# Patient Record
Sex: FEMALE | Race: WHITE | NOT HISPANIC OR LATINO | Employment: OTHER | URBAN - METROPOLITAN AREA
[De-identification: names, ages, dates, MRNs, and addresses within clinical notes are randomized per-mention and may not be internally consistent; named-entity substitution may affect disease eponyms.]

---

## 2017-11-14 ENCOUNTER — CONVERSION ENCOUNTER (OUTPATIENT)
Dept: MAMMOGRAPHY | Facility: HOSPITAL | Age: 57
End: 2017-11-14

## 2018-01-09 ENCOUNTER — OFFICE VISIT CONVERTED (OUTPATIENT)
Dept: GASTROENTEROLOGY | Facility: CLINIC | Age: 58
End: 2018-01-09
Attending: INTERNAL MEDICINE

## 2018-01-09 ENCOUNTER — CONVERSION ENCOUNTER (OUTPATIENT)
Dept: GASTROENTEROLOGY | Facility: CLINIC | Age: 58
End: 2018-01-09

## 2018-01-24 ENCOUNTER — OFFICE VISIT CONVERTED (OUTPATIENT)
Dept: UROLOGY | Facility: CLINIC | Age: 58
End: 2018-01-24
Attending: UROLOGY

## 2018-04-10 ENCOUNTER — OFFICE VISIT CONVERTED (OUTPATIENT)
Dept: GASTROENTEROLOGY | Facility: CLINIC | Age: 58
End: 2018-04-10
Attending: INTERNAL MEDICINE

## 2018-04-10 ENCOUNTER — CONVERSION ENCOUNTER (OUTPATIENT)
Dept: GASTROENTEROLOGY | Facility: CLINIC | Age: 58
End: 2018-04-10

## 2018-07-16 ENCOUNTER — OFFICE VISIT CONVERTED (OUTPATIENT)
Dept: GASTROENTEROLOGY | Facility: CLINIC | Age: 58
End: 2018-07-16
Attending: NURSE PRACTITIONER

## 2018-07-16 ENCOUNTER — CONVERSION ENCOUNTER (OUTPATIENT)
Dept: GASTROENTEROLOGY | Facility: CLINIC | Age: 58
End: 2018-07-16

## 2018-08-02 ENCOUNTER — OFFICE VISIT CONVERTED (OUTPATIENT)
Dept: UROLOGY | Facility: CLINIC | Age: 58
End: 2018-08-02
Attending: UROLOGY

## 2018-09-04 ENCOUNTER — OFFICE VISIT CONVERTED (OUTPATIENT)
Dept: CARDIOLOGY | Facility: CLINIC | Age: 58
End: 2018-09-04
Attending: SPECIALIST

## 2018-09-04 ENCOUNTER — CONVERSION ENCOUNTER (OUTPATIENT)
Dept: CARDIOLOGY | Facility: CLINIC | Age: 58
End: 2018-09-04

## 2018-09-18 ENCOUNTER — CONVERSION ENCOUNTER (OUTPATIENT)
Dept: CARDIOLOGY | Facility: CLINIC | Age: 58
End: 2018-09-18
Attending: SPECIALIST

## 2018-10-18 ENCOUNTER — CONVERSION ENCOUNTER (OUTPATIENT)
Dept: GASTROENTEROLOGY | Facility: CLINIC | Age: 58
End: 2018-10-18

## 2018-10-18 ENCOUNTER — OFFICE VISIT CONVERTED (OUTPATIENT)
Dept: GASTROENTEROLOGY | Facility: CLINIC | Age: 58
End: 2018-10-18
Attending: INTERNAL MEDICINE

## 2018-12-03 ENCOUNTER — OFFICE VISIT CONVERTED (OUTPATIENT)
Dept: PODIATRY | Facility: CLINIC | Age: 58
End: 2018-12-03
Attending: PODIATRIST

## 2019-01-17 ENCOUNTER — HOSPITAL ENCOUNTER (OUTPATIENT)
Dept: MAMMOGRAPHY | Facility: HOSPITAL | Age: 59
Discharge: HOME | End: 2019-01-17
Attending: NURSE PRACTITIONER

## 2019-02-04 ENCOUNTER — OFFICE VISIT CONVERTED (OUTPATIENT)
Dept: UROLOGY | Facility: CLINIC | Age: 59
End: 2019-02-04
Attending: UROLOGY

## 2019-02-06 ENCOUNTER — HOSPITAL ENCOUNTER (OUTPATIENT)
Dept: GENERAL RADIOLOGY | Facility: HOSPITAL | Age: 59
Discharge: HOME | End: 2019-02-06
Attending: NURSE PRACTITIONER

## 2019-02-11 ENCOUNTER — HOSPITAL ENCOUNTER (OUTPATIENT)
Dept: ULTRASOUND IMAGING | Facility: HOSPITAL | Age: 59
Discharge: HOME | End: 2019-02-11
Attending: UROLOGY

## 2019-02-18 ENCOUNTER — CONVERSION ENCOUNTER (OUTPATIENT)
Dept: UROLOGY | Facility: CLINIC | Age: 59
End: 2019-02-18

## 2019-02-18 ENCOUNTER — OFFICE VISIT CONVERTED (OUTPATIENT)
Dept: UROLOGY | Facility: CLINIC | Age: 59
End: 2019-02-18
Attending: UROLOGY

## 2019-02-19 ENCOUNTER — CONVERSION ENCOUNTER (OUTPATIENT)
Dept: GASTROENTEROLOGY | Facility: CLINIC | Age: 59
End: 2019-02-19

## 2019-02-19 ENCOUNTER — HOSPITAL ENCOUNTER (OUTPATIENT)
Dept: GENERAL RADIOLOGY | Facility: HOSPITAL | Age: 59
Discharge: HOME | End: 2019-02-19
Attending: UROLOGY

## 2019-02-19 ENCOUNTER — OFFICE VISIT CONVERTED (OUTPATIENT)
Dept: GASTROENTEROLOGY | Facility: CLINIC | Age: 59
End: 2019-02-19
Attending: INTERNAL MEDICINE

## 2019-02-20 ENCOUNTER — HOSPITAL ENCOUNTER (OUTPATIENT)
Dept: PERIOP | Facility: HOSPITAL | Age: 59
Setting detail: HOSPITAL OUTPATIENT SURGERY
Discharge: HOME | End: 2019-02-20
Attending: UROLOGY

## 2019-02-20 LAB
ALBUMIN SERPL-MCNC: 4 G/DL (ref 3.5–5)
ALBUMIN/GLOB SERPL: 1.4 {RATIO} (ref 1.4–2.6)
ALP SERPL-CCNC: 86 U/L (ref 53–141)
ALT SERPL-CCNC: 23 U/L (ref 10–40)
ANION GAP SERPL CALC-SCNC: 13 MMOL/L (ref 8–19)
AST SERPL-CCNC: 20 U/L (ref 15–50)
BASOPHILS # BLD AUTO: 0.04 10*3/UL (ref 0–0.2)
BASOPHILS NFR BLD AUTO: 0.9 % (ref 0–3)
BILIRUB SERPL-MCNC: 0.36 MG/DL (ref 0.2–1.3)
BUN SERPL-MCNC: 19 MG/DL (ref 5–25)
BUN/CREAT SERPL: 17 {RATIO} (ref 6–20)
CALCIUM SERPL-MCNC: 9.3 MG/DL (ref 8.7–10.4)
CHLORIDE SERPL-SCNC: 110 MMOL/L (ref 99–111)
CONV ABS IMM GRAN: 0.01 10*3/UL (ref 0–0.2)
CONV CO2: 24 MMOL/L (ref 22–32)
CONV IMMATURE GRAN: 0.2 % (ref 0–1.8)
CONV TOTAL PROTEIN: 6.9 G/DL (ref 6.3–8.2)
CREAT UR-MCNC: 1.15 MG/DL (ref 0.5–0.9)
DEPRECATED RDW RBC AUTO: 45.2 FL (ref 36.4–46.3)
EOSINOPHIL # BLD AUTO: 0.15 10*3/UL (ref 0–0.7)
EOSINOPHIL # BLD AUTO: 3.2 % (ref 0–7)
ERYTHROCYTE [DISTWIDTH] IN BLOOD BY AUTOMATED COUNT: 12.9 % (ref 11.7–14.4)
GFR SERPLBLD BASED ON 1.73 SQ M-ARVRAT: 52 ML/MIN/{1.73_M2}
GLOBULIN UR ELPH-MCNC: 2.9 G/DL (ref 2–3.5)
GLUCOSE SERPL-MCNC: 97 MG/DL (ref 65–99)
HBA1C MFR BLD: 11.9 G/DL (ref 12–16)
HCT VFR BLD AUTO: 37 % (ref 37–47)
LYMPHOCYTES # BLD AUTO: 1.64 10*3/UL (ref 1–5)
MCH RBC QN AUTO: 31 PG (ref 27–31)
MCHC RBC AUTO-ENTMCNC: 32.2 G/DL (ref 33–37)
MCV RBC AUTO: 96.4 FL (ref 81–99)
MONOCYTES # BLD AUTO: 0.25 10*3/UL (ref 0.2–1.2)
MONOCYTES NFR BLD AUTO: 5.4 % (ref 3–10)
NEUTROPHILS # BLD AUTO: 2.53 10*3/UL (ref 2–8)
NEUTROPHILS NFR BLD AUTO: 54.8 % (ref 30–85)
NRBC CBCN: 0 % (ref 0–0.7)
OSMOLALITY SERPL CALC.SUM OF ELEC: 298 MOSM/KG (ref 273–304)
PLATELET # BLD AUTO: 237 10*3/UL (ref 130–400)
PMV BLD AUTO: 9.6 FL (ref 9.4–12.3)
POTASSIUM SERPL-SCNC: 4.1 MMOL/L (ref 3.5–5.3)
RBC # BLD AUTO: 3.84 10*6/UL (ref 4.2–5.4)
SODIUM SERPL-SCNC: 143 MMOL/L (ref 135–147)
VARIANT LYMPHS NFR BLD MANUAL: 35.5 % (ref 20–45)
WBC # BLD AUTO: 4.62 10*3/UL (ref 4.8–10.8)

## 2019-05-06 ENCOUNTER — OFFICE VISIT CONVERTED (OUTPATIENT)
Dept: UROLOGY | Facility: CLINIC | Age: 59
End: 2019-05-06
Attending: UROLOGY

## 2019-05-06 ENCOUNTER — CONVERSION ENCOUNTER (OUTPATIENT)
Dept: UROLOGY | Facility: CLINIC | Age: 59
End: 2019-05-06

## 2019-06-04 ENCOUNTER — OFFICE VISIT CONVERTED (OUTPATIENT)
Dept: CARDIOLOGY | Facility: CLINIC | Age: 59
End: 2019-06-04
Attending: SPECIALIST

## 2019-07-08 ENCOUNTER — OFFICE VISIT CONVERTED (OUTPATIENT)
Dept: UROLOGY | Facility: CLINIC | Age: 59
End: 2019-07-08
Attending: UROLOGY

## 2019-08-05 ENCOUNTER — HOSPITAL ENCOUNTER (OUTPATIENT)
Dept: GENERAL RADIOLOGY | Facility: HOSPITAL | Age: 59
Discharge: HOME | End: 2019-08-05

## 2019-09-10 ENCOUNTER — HOSPITAL ENCOUNTER (OUTPATIENT)
Dept: OTHER | Facility: HOSPITAL | Age: 59
Discharge: HOME | End: 2019-09-10
Attending: NURSE PRACTITIONER

## 2019-11-27 ENCOUNTER — HOSPITAL ENCOUNTER (OUTPATIENT)
Dept: INFUSION THERAPY | Facility: HOSPITAL | Age: 59
Discharge: HOME | End: 2019-11-27
Attending: NURSE PRACTITIONER

## 2020-01-13 ENCOUNTER — OFFICE VISIT CONVERTED (OUTPATIENT)
Dept: UROLOGY | Facility: CLINIC | Age: 60
End: 2020-01-13
Attending: UROLOGY

## 2020-02-21 ENCOUNTER — HOSPITAL ENCOUNTER (OUTPATIENT)
Dept: OTHER | Facility: HOSPITAL | Age: 60
Discharge: HOME | End: 2020-02-21
Attending: NURSE PRACTITIONER

## 2020-02-22 LAB — HCV AB S/CO SERPL IA: <0.1 S/CO RATIO (ref 0–0.9)

## 2020-05-18 ENCOUNTER — HOSPITAL ENCOUNTER (OUTPATIENT)
Dept: OTHER | Facility: HOSPITAL | Age: 60
Discharge: HOME | End: 2020-05-18
Attending: INTERNAL MEDICINE

## 2020-05-18 LAB
25(OH)D3 SERPL-MCNC: 46.7 NG/ML (ref 30–100)
ALBUMIN SERPL-MCNC: 4.4 G/DL (ref 3.5–5)
ALBUMIN/GLOB SERPL: 1.6 {RATIO} (ref 1.4–2.6)
ALP SERPL-CCNC: 82 U/L (ref 53–141)
ALT SERPL-CCNC: 17 U/L (ref 10–40)
ANION GAP SERPL CALC-SCNC: 14 MMOL/L (ref 8–19)
AST SERPL-CCNC: 26 U/L (ref 15–50)
BASOPHILS # BLD AUTO: 0.05 10*3/UL (ref 0–0.2)
BASOPHILS NFR BLD AUTO: 1 % (ref 0–3)
BILIRUB SERPL-MCNC: 0.38 MG/DL (ref 0.2–1.3)
BUN SERPL-MCNC: 10 MG/DL (ref 5–25)
BUN/CREAT SERPL: 9 {RATIO} (ref 6–20)
CALCIUM SERPL-MCNC: 9.7 MG/DL (ref 8.7–10.4)
CHLORIDE SERPL-SCNC: 108 MMOL/L (ref 99–111)
CHOLEST SERPL-MCNC: 153 MG/DL (ref 107–200)
CHOLEST/HDLC SERPL: 3.9 {RATIO} (ref 3–6)
CONV ABS IMM GRAN: 0.01 10*3/UL (ref 0–0.2)
CONV CO2: 23 MMOL/L (ref 22–32)
CONV IMMATURE GRAN: 0.2 % (ref 0–1.8)
CONV TOTAL PROTEIN: 7.2 G/DL (ref 6.3–8.2)
CREAT UR-MCNC: 1.06 MG/DL (ref 0.5–0.9)
DEPRECATED RDW RBC AUTO: 47.3 FL (ref 36.4–46.3)
EOSINOPHIL # BLD AUTO: 0.16 10*3/UL (ref 0–0.7)
EOSINOPHIL # BLD AUTO: 3.2 % (ref 0–7)
ERYTHROCYTE [DISTWIDTH] IN BLOOD BY AUTOMATED COUNT: 12.8 % (ref 11.7–14.4)
GFR SERPLBLD BASED ON 1.73 SQ M-ARVRAT: 57 ML/MIN/{1.73_M2}
GLOBULIN UR ELPH-MCNC: 2.8 G/DL (ref 2–3.5)
GLUCOSE SERPL-MCNC: 108 MG/DL (ref 65–99)
HCT VFR BLD AUTO: 41.7 % (ref 37–47)
HDLC SERPL-MCNC: 39 MG/DL (ref 40–60)
HGB BLD-MCNC: 13.1 G/DL (ref 12–16)
IRON SATN MFR SERPL: 19 % (ref 20–55)
IRON SERPL-MCNC: 67 UG/DL (ref 60–170)
LDLC SERPL CALC-MCNC: 82 MG/DL (ref 70–100)
LYMPHOCYTES # BLD AUTO: 1.62 10*3/UL (ref 1–5)
LYMPHOCYTES NFR BLD AUTO: 32.3 % (ref 20–45)
MCH RBC QN AUTO: 31.7 PG (ref 27–31)
MCHC RBC AUTO-ENTMCNC: 31.4 G/DL (ref 33–37)
MCV RBC AUTO: 101 FL (ref 81–99)
MONOCYTES # BLD AUTO: 0.37 10*3/UL (ref 0.2–1.2)
MONOCYTES NFR BLD AUTO: 7.4 % (ref 3–10)
NEUTROPHILS # BLD AUTO: 2.81 10*3/UL (ref 2–8)
NEUTROPHILS NFR BLD AUTO: 55.9 % (ref 30–85)
NRBC CBCN: 0 % (ref 0–0.7)
OSMOLALITY SERPL CALC.SUM OF ELEC: 292 MOSM/KG (ref 273–304)
PLATELET # BLD AUTO: 268 10*3/UL (ref 130–400)
PMV BLD AUTO: 10.6 FL (ref 9.4–12.3)
POTASSIUM SERPL-SCNC: 4.3 MMOL/L (ref 3.5–5.3)
RBC # BLD AUTO: 4.13 10*6/UL (ref 4.2–5.4)
SODIUM SERPL-SCNC: 141 MMOL/L (ref 135–147)
T4 FREE SERPL-MCNC: 1.2 NG/DL (ref 0.9–1.8)
TIBC SERPL-MCNC: 359 UG/DL (ref 245–450)
TRANSFERRIN SERPL-MCNC: 251 MG/DL (ref 250–380)
TRIGL SERPL-MCNC: 159 MG/DL (ref 40–150)
TSH SERPL-ACNC: 1.77 M[IU]/L (ref 0.27–4.2)
VIT B12 SERPL-MCNC: >2000 PG/ML (ref 211–911)
VLDLC SERPL-MCNC: 32 MG/DL (ref 5–37)
WBC # BLD AUTO: 5.02 10*3/UL (ref 4.8–10.8)

## 2020-05-27 ENCOUNTER — HOSPITAL ENCOUNTER (OUTPATIENT)
Dept: INFUSION THERAPY | Facility: HOSPITAL | Age: 60
Discharge: HOME | End: 2020-05-27
Attending: NURSE PRACTITIONER

## 2020-07-16 ENCOUNTER — OFFICE VISIT CONVERTED (OUTPATIENT)
Dept: UROLOGY | Facility: CLINIC | Age: 60
End: 2020-07-16
Attending: UROLOGY

## 2020-08-21 ENCOUNTER — HOSPITAL ENCOUNTER (OUTPATIENT)
Dept: MAMMOGRAPHY | Facility: HOSPITAL | Age: 60
Discharge: HOME | End: 2020-08-21
Attending: NURSE PRACTITIONER

## 2020-12-02 ENCOUNTER — HOSPITAL ENCOUNTER (OUTPATIENT)
Dept: OTHER | Facility: HOSPITAL | Age: 60
Discharge: HOME | End: 2020-12-02
Attending: NURSE PRACTITIONER

## 2020-12-17 ENCOUNTER — HOSPITAL ENCOUNTER (OUTPATIENT)
Dept: INFUSION THERAPY | Facility: HOSPITAL | Age: 60
Discharge: HOME | End: 2020-12-17
Attending: NURSE PRACTITIONER

## 2020-12-17 LAB
ANION GAP SERPL CALC-SCNC: 13 MMOL/L (ref 8–19)
BUN SERPL-MCNC: 13 MG/DL (ref 5–25)
BUN/CREAT SERPL: 14 {RATIO} (ref 6–20)
CALCIUM SERPL-MCNC: 9.3 MG/DL (ref 8.7–10.4)
CHLORIDE SERPL-SCNC: 108 MMOL/L (ref 99–111)
CONV CO2: 23 MMOL/L (ref 22–32)
CREAT UR-MCNC: 0.96 MG/DL (ref 0.5–0.9)
GFR SERPLBLD BASED ON 1.73 SQ M-ARVRAT: >60 ML/MIN/{1.73_M2}
GLUCOSE SERPL-MCNC: 148 MG/DL (ref 65–99)
OSMOLALITY SERPL CALC.SUM OF ELEC: 293 MOSM/KG (ref 273–304)
POTASSIUM SERPL-SCNC: 4.3 MMOL/L (ref 3.5–5.3)
SODIUM SERPL-SCNC: 140 MMOL/L (ref 135–147)

## 2021-01-15 ENCOUNTER — HOSPITAL ENCOUNTER (OUTPATIENT)
Dept: URGENT CARE | Facility: CLINIC | Age: 61
Discharge: HOME | End: 2021-01-15
Attending: PHYSICIAN ASSISTANT

## 2021-01-21 ENCOUNTER — CONVERSION ENCOUNTER (OUTPATIENT)
Dept: UROLOGY | Facility: CLINIC | Age: 61
End: 2021-01-21

## 2021-01-21 ENCOUNTER — OFFICE VISIT CONVERTED (OUTPATIENT)
Dept: UROLOGY | Facility: CLINIC | Age: 61
End: 2021-01-21
Attending: UROLOGY

## 2021-03-03 ENCOUNTER — HOSPITAL ENCOUNTER (OUTPATIENT)
Dept: OTHER | Facility: HOSPITAL | Age: 61
Discharge: HOME | End: 2021-03-03
Attending: NURSE PRACTITIONER

## 2021-03-09 ENCOUNTER — HOSPITAL ENCOUNTER (OUTPATIENT)
Dept: VACCINE CLINIC | Facility: HOSPITAL | Age: 61
Discharge: HOME | End: 2021-03-09
Attending: INTERNAL MEDICINE

## 2021-03-30 ENCOUNTER — HOSPITAL ENCOUNTER (OUTPATIENT)
Dept: VACCINE CLINIC | Facility: HOSPITAL | Age: 61
Discharge: HOME | End: 2021-03-30
Attending: INTERNAL MEDICINE

## 2021-04-08 ENCOUNTER — OFFICE VISIT CONVERTED (OUTPATIENT)
Dept: PODIATRY | Facility: CLINIC | Age: 61
End: 2021-04-08
Attending: PODIATRIST

## 2021-04-12 ENCOUNTER — HOSPITAL ENCOUNTER (OUTPATIENT)
Dept: GENERAL RADIOLOGY | Facility: HOSPITAL | Age: 61
Discharge: HOME | End: 2021-04-12
Attending: NURSE PRACTITIONER

## 2021-05-07 ENCOUNTER — HOSPITAL ENCOUNTER (OUTPATIENT)
Dept: MRI IMAGING | Facility: HOSPITAL | Age: 61
Discharge: HOME | End: 2021-05-07
Attending: NURSE PRACTITIONER

## 2021-05-07 LAB
CREAT BLD-MCNC: 1 MG/DL (ref 0.6–1.4)
GFR SERPLBLD BASED ON 1.73 SQ M-ARVRAT: >60 ML/MIN/{1.73_M2}

## 2021-05-11 NOTE — H&P
History and Physical      Patient Name: Ann Perla   Patient ID: [id number]   Sex: Female   YOB: 1960    Primary Care Provider: Estee DANG   Referring Provider: Lawrence Zamorano DPM    Visit Date: April 8, 2021    Provider: Lawrence Zamorano DPM   Location: [facility]   Location Address: [address] [place] [address]  [id number]   Location Phone: [phone]          Chief Complaint  · Right Foot Pain      History Of Present Illness  Ann Perla is a 60 year old /White female who presents to the [facility] today new patient      New, Established, New Problem: New  Location: Right bunion  Duration: Greater than 5 years  Onset: Insidious  Nature: Sore  Stable, worsening, improving: Worsening  Aggravating factors:  Patient relates pain is aggravated by shoe gear and ambulation.    Previous Treatment: Bunionectomy in distant past.    Patient denies any fevers, chills, nausea, vomiting, shortness of breathe, nor any other constitutional signs nor symptoms.           Past Medical History  Abdominal Pain, LLQ; Depressive Disorder; Foot pain, right; GERD (gastroesophageal reflux disease); Hallux valgus of right foot; Herpes genitalia; Hiatal hernia; HLD (hyperlipidemia); Migraine; Mixed incontinence urge and stress; OAB (overactive bladder); Urinary urgency         Past Surgical History  Cervical Fusion; Colonoscopy; Cystoscopy; EGD; Foot surgery; Hand surgery, left; Hand surgery, right; Hiatal hernia repair; Neck; Retrograde pyelography using cystoscopy; Tubal ligation; Vein Surgery         Medication List  ergocalciferol (vitamin D2) 50,000 unit oral capsule; levothyroxine 50 mcg oral tablet; propranolol 80 mg oral capsule,extended release 24 hr; Seroquel 200 mg oral tablet; Topamax 100 mg oral tablet; valacyclovir 500 mg oral tablet; Vesicare 5 mg oral tablet; Viibryd 40 mg oral tablet; Zocor 20 mg oral tablet         Allergy  "List  NO KNOWN DRUG ALLERGIES       Allergies Reconciled  Family Medical History  Stroke; Heart Disease; Cancer, Unspecified; Hypertension; Lung cancer; Renal Calculus; Diabetes         Social History  Alcohol (Former); ; Tobacco (Never); Unemployed         Review of Systems  · Constitutional  o Denies  o : fatigue, night sweats  · Eyes  o Denies  o : double vision, blurred vision  · HENT  o Denies  o : vertigo, recent head injury  · Cardiovascular  o Denies  o : chest pain, irregular heart beats  · Respiratory  o Denies  o : shortness of breath, productive cough  · Gastrointestinal  o Denies  o : nausea, vomiting  · Genitourinary  o Denies  o : dysuria, urinary retention  · Integument  o Denies  o : hair growth change, new skin lesions  · Neurologic  o Denies  o : altered mental status, seizures  · Musculoskeletal  o * See HPI  · Endocrine  o Denies  o : cold intolerance, heat intolerance  · Heme-Lymph  o Denies  o : petechiae, lymph node enlargement or tenderness  · Allergic-Immunologic  o Denies  o : frequent illnesses      Vitals  Date Time BP Position Site L\R Cuff Size HR RR TEMP (F) WT  HT  BMI kg/m2 BSA m2 O2 Sat FR L/min FiO2        04/08/2021 03:20 /72 Sitting    68 - R  97.6 146lbs 16oz 5'  6\" 23.73 1.76 100 %            Physical Examination  · Constitutional  o Appearance  o : well developed, well-nourished, no obvious deformities present  · Cardiovascular  o Peripheral Vascular System  o :   § Pedal Pulses  § : pulses 2 + and symmetrical  § Extremities  § : no edema in lower extremities  · Musculoskeletal  o General  o :   § General Musculoskeletal  § : Lower extremity muscle and strength and range of motion is equal and symmetrical bilaterally. The knees are noted to be normal in alignment. Right medial deviation of the first metatarsal with associated lateral deviation of the hallux at the metatarsal phalangeal joint. Nonreducible contracted right second and third toes.  · Skin and " Subcutaneous Tissue  o General Inspection  o : Skin is noted to have normal texture and turgor, with no excrescences noted.   o Digits and Nails  o : The toenails are noted to be without disese.  · Neurologic  o Sensation  o : Epicritic sensations intact bilaterally.     IN-OFFICE IMAGING:  3 view, AP, MO, Lateral, right foot.  Medial deviation of the first metatarsal with associated lateral deviation of the hallux at the metatarsal phalangeal joint.  The 1st-2nd inner metatarsal angle is 15.  The DASA is 29.  Contracted right second and third toes.  No periosteal reactions nor osteolytic changes seen.  No occult fractures seen.           Assessment  · Foot pain, right     729.5/M79.671  · Hallux abducto valgus, right     735.0/M20.11      Plan  · Orders  o Foot (Right) 3 or more views X-Ray [facility] Preferred View (15513-[id number]) - - 04/08/2021  · Medications  o Medications have been Reconciled  o Transition of Care or Provider Policy  · Instructions  o Follow Up PRN.  o Discuss Findings: I have discussed the findings of this evaluation with the patient. The discussion included a complete verbal explanation of any changes in the examination results, diagnosis, and the current treatment plan. A schedule for future care needs was explained. If any questions should arise after returning home, I have encouraged the patient to feel free to contact Dr. Zamorano. The patient states understanding and agreement with this plan.  o Upon discussion of non-surgical conservative option, surgical correction, post-operative requirements along with risk and benefits of the surgery along with expected outcomes, the patient states they would like to proceed with the scheduling surgery. Preop appointment will be planned. Procedure: Right base wedge bunionectomy, modified Kovacs bunionectomy, hammertoe corrections of the right second and third toes. Below-knee casting.  o Electronically Identified Patient Education Materials Provided  Electronically  · Disposition  o Call or Return if symptoms worsen or persist.            Electronically Signed by: Lawrence Zamorano DPM -Author on April 8, 2021 03:59:55 PM

## 2021-05-13 NOTE — PROGRESS NOTES
Progress Note      Patient Name: Ann Perla   Patient ID: [id number]   Sex: Female   YOB: 1960    Primary Care Provider: Estee DANG   Referring Provider: Preeti DANG    Visit Date: July 16, 2020    Provider: Lizz King MD   Location: [facility]   Location Address: [address] [place] [address], [address] [place] [address]  [id number]   Location Phone: [phone]          Chief Complaint  · 6 month follow up      History Of Present Illness  The patient is a 59 year old /White female , who is here for a 6 month follow up for OAB.          2/20/19 retrograde, CBC, CMP  11/16/15 urodynamics.  Patient has urinary frequency and urgency.  Patient is taking Vesicare and it has improved her.  Nocturia once or twice because she has to drink a lot of water because of dry mouth.  She drinks a lot of water so she has some frequency in the daytime.  She can hold her urine and does not have to run to the bathroom.  Patient has diverticulitis and takes Cipro and Flagyl.  No dysuria or gross hematuria.       Past Medical History  Abdominal Pain, LLQ; Depressive Disorder; GERD (gastroesophageal reflux disease); Hallux valgus of right foot; Herpes genitalia; Hiatal hernia; HLD (hyperlipidemia); Migraine; Mixed incontinence urge and stress; OAB (overactive bladder); Urinary urgency         Past Surgical History  Cervical Fusion; Colonoscopy; Cystoscopy; EGD; Foot surgery; Hand surgery, left; Hand surgery, right; Hiatal hernia repair; Neck; Retrograde pyelography using cystoscopy; Tubal ligation; Vein Surgery         Medication List  bupropion HCl 150 mg oral tablet extended release; Calcium 500 + D 500 mg(1,250mg) -400 unit oral tablet; ciprofloxacin 500 mg/5 mL oral suspension,microcapsule recon; ergocalciferol (vitamin D2) 50,000 unit oral capsule; Inderal LA 80 mg oral capsule,extended release 24 hr; levothyroxine 50 mcg oral tablet; metronidazole 500 mg oral tablet;  "pantoprazole 40 mg oral tablet,delayed release (DR/EC); Seroquel 200 mg oral tablet; Topamax 100 mg oral tablet; Valtrex 500 mg oral tablet; Vesicare 5 mg oral tablet; Zocor 20 mg oral tablet         Allergy List  NO KNOWN DRUG ALLERGIES         Family Medical History  Stroke; Heart Disease; Cancer, Unspecified; Hypertension; Lung cancer; Renal Calculus; Diabetes         Social History  Alcohol (Former); ; Tobacco (Never); Unemployed         Review of Systems  · Constitutional  o Denies  o : fever, headache, chills  · Eyes  o Denies  o : eye pain, double vision, blurred vision  · HENT  o Denies  o : sinus problems, sore throat, ear infection  · Cardiovascular  o Denies  o : chest pain, high blood pressure, varicosities  · Respiratory  o Denies  o : shortness of breath, wheezing, frequent cough  · Gastrointestinal  o Admits  o : abdominal pain  o Denies  o : nausea, vomiting, heartburn, indigestion  · Genitourinary  o Denies  o : urgency, frequency, urinary retention, painful urination  · Integument  o Denies  o : rash, itching, boils  · Neurologic  o Denies  o : tingling or numbness, tremors, dizzy spells  · Musculoskeletal  o Denies  o : joint pain, neck pain, back pain  · Endocrine  o Denies  o : cold intolerance, heat intolerance, tired, excessive thirst, sluggish  · Psychiatric  o Admits  o : feels satisfied with life  o Denies  o : severe depression, concerns with hurting themselves  · Heme-Lymph  o Denies  o : swollen glands, blood clotting problems  · Allergic-Immunologic  o Denies  o : sinus allergy symptoms, hay fever      Vitals  Date Time BP Position Site L\R Cuff Size HR RR TEMP (F) WT  HT  BMI kg/m2 BSA m2 O2 Sat        07/16/2020 09:37 /76 Sitting    56 - R  97.9 145lbs 0oz 5'  6\" 23.4 1.75           Physical Examination  · Constitutional  o Appearance  o : Well nourished, well developed patient in no acute distress. Ambulating without difficulty.  · Respiratory  o Respiratory " Effort  o : Breathing is unlabored without accessory muscle use  o Inspection of Chest  o : normal appearance, no retractions  o Auscultation of Lungs  o : normal breath sounds throughout  · Cardiovascular  o Heart  o :   § Auscultation of Heart  § : regular rate and rhythm, no murmurs, gallops or rubs  o Peripheral Vascular System  o : No abnormalities  · Gastrointestinal  o Abdominal Examination  o : Scaphoid abdomen which is non-tender to palpation with normal tone and without rigidity or guarding. Normal bowel sounds. No masses present.  · Lymphatic  o Groin  o : No lymphadenopathy present  · Skin and Subcutaneous Tissue  o General Inspection  o : No rashes, lesions or areas of discoloration present. Skin turgor is normal.  · Neurologic  o Mental Status Examination  o : grossly oriented to person, place and time  o Gait and Station  o : normal gait, able to stand without difficulty  · Psychiatric  o Mood and Affect  o : mood normal, affect appropriate      Figure 1.0: Pain Rating Scale-[place]         Results  · In-Office Procedures  o Lab procedure  § Automated dipstick urinalysis with microscopy (96223)   § Color Ur: Dark yellow   § Clarity Ur: Clear   § Glucose Ur Ql Strip: Negative   § Bilirub Ur Ql Strip: Negative   § Ketones Ur Ql Strip: Negative   § Sp Gr Ur Qn: 1.025   § Hgb Ur Ql Strip: Negative   § pH Ur-LsCnc: 6.0   § Prot Ur Ql Strip: Negative   § Urobilinogen Ur Strip-mCnc: 0.2 E.U./dL   § Nitrite Ur Ql Strip: Negative   § WBC Est Ur Ql Strip: Small   § RBC UrnS Qn HPF: 0   § WBC UrnS Qn HPF: 0   § Bacteria UrnS Qn HPF: 0   § Crystals UrnS Qn HPF: 0   § Epithelial Cells (non renal): 0 /HPF  § Epithelial Cells (renal): 0       Assessment  · OAB (overactive bladder)     596.51/N32.81    Problems Reconciled  Plan  · Medications  o Medications have been Reconciled  o Transition of Care or Provider Policy  · Instructions  o We will continue Vesicare 5 mg daily and recheck her in 6 months  time            Electronically Signed by: Lizz King MD -Author on July 16, 2020 10:30:00 AM

## 2021-05-14 VITALS
HEART RATE: 68 BPM | HEIGHT: 66 IN | WEIGHT: 147 LBS | TEMPERATURE: 97.6 F | OXYGEN SATURATION: 100 % | SYSTOLIC BLOOD PRESSURE: 108 MMHG | BODY MASS INDEX: 23.63 KG/M2 | DIASTOLIC BLOOD PRESSURE: 72 MMHG

## 2021-05-14 VITALS
HEIGHT: 66 IN | DIASTOLIC BLOOD PRESSURE: 68 MMHG | HEART RATE: 61 BPM | TEMPERATURE: 97.5 F | BODY MASS INDEX: 23.3 KG/M2 | SYSTOLIC BLOOD PRESSURE: 106 MMHG | WEIGHT: 145 LBS

## 2021-05-14 NOTE — PROGRESS NOTES
Progress Note      Patient Name: Ann Perla   Patient ID: [id number]   Sex: Female   YOB: 1960    Primary Care Provider: Estee DANG   Referring Provider: Preeti DANG    Visit Date: January 21, 2021    Provider: Lizz King MD   Location: [facility]   Location Address: [address] [place] [address], [address] [place] [address]  [id number]   Location Phone: [phone]          Chief Complaint  · follow up       History Of Present Illness  The patient is a 60 year old /White female , who is a follow up for oab. Patient has been on Vesicare and is doing very well. Nocturia 1 time. She is doing very well in the daytime. She never skips her medication. She has dryness of throat and is constipated. Patient is happy with her treatment. If she does not drink enough water she has a problem that she has to urinate but nothing comes out           Review of Systems  · Constitutional  o Denies  o : fever, headache, chills  · Eyes  o Denies  o : eye pain, double vision, blurred vision  · HENT  o Denies  o : sinus problems, sore throat, ear infection  · Cardiovascular  o Denies  o : chest pain, high blood pressure, varicosities  · Respiratory  o Denies  o : shortness of breath, wheezing, frequent cough  · Gastrointestinal  o Denies  o : nausea, vomiting, heartburn, indigestion, abdominal pain  · Genitourinary  o Denies  o : urgency, frequency, urinary retention, painful urination  · Integument  o Denies  o : rash, itching, boils  · Neurologic  o Denies  o : tingling or numbness, tremors, dizzy spells  · Musculoskeletal  o Denies  o : joint pain, neck pain, back pain  · Endocrine  o Denies  o : cold intolerance, heat intolerance, tired, excessive thirst, sluggish  · Psychiatric  o Admits  o : feels satisfied with life  o Denies  o : severe depression, concerns with hurting themselves  · Heme-Lymph  o Denies  o : swollen glands, blood clotting  "problems  · Allergic-Immunologic  o Denies  o : sinus allergy symptoms, hay fever  · All Others Negative      Vitals  Date Time BP Position Site L\R Cuff Size HR RR TEMP (F) WT  HT  BMI kg/m2 BSA m2 O2 Sat FR L/min FiO2 HC       01/21/2021 10:04 /68 Sitting    61 - R  97.5 145lbs 0oz 5'  6\" 23.4 1.75             Physical Examination  · Constitutional  o Appearance  o : Well nourished, well developed patient in no acute distress. Ambulating without difficulty.  · Respiratory  o Respiratory Effort  o : Breathing is unlabored without accessory muscle use  o Inspection of Chest  o : normal appearance, no retractions  o Auscultation of Lungs  o : normal breath sounds throughout  · Cardiovascular  o Heart  o :   § Auscultation of Heart  § : regular rate and rhythm, no murmurs, gallops or rubs  o Peripheral Vascular System  o : No abnormalities  · Gastrointestinal  o Abdominal Examination  o : Scaphoid abdomen which is non-tender to palpation with normal tone and without rigidity or guarding. Normal bowel sounds. No masses present.  · Lymphatic  o Groin  o : No lymphadenopathy present  · Skin and Subcutaneous Tissue  o General Inspection  o : No rashes, lesions or areas of discoloration present. Skin turgor is normal.  · Neurologic  o Mental Status Examination  o : grossly oriented to person, place and time  o Gait and Station  o : normal gait, able to stand without difficulty  · Psychiatric  o Mood and Affect  o : mood normal, affect appropriate      Figure 1.0: Pain Rating Scale-[place]         Results  · In-Office Procedures  o Lab procedure  § Automated dipstick urinalysis with microscopy ([id number])   § Color Ur: Yellow   § Clarity Ur: Clear   § Glucose Ur Ql Strip: Negative   § Bilirub Ur Ql Strip: Negative   § Ketones Ur Ql Strip: Negative   § Sp Gr Ur Qn: 1.010   § Hgb Ur Ql Strip: Negative   § pH Ur-LsCnc: 6.0   § Prot Ur Ql Strip: Negative   § Urobilinogen Ur Strip-mCnc: 0.2 E.U./dL   § Nitrite Ur Ql Strip: " Negative   § WBC Est Ur Ql Strip: Small   § RBC UrnS Qn HPF: 0   § WBC UrnS Qn HPF: 0   § Bacteria UrnS Qn HPF: 0   § Crystals UrnS Qn HPF: 0   § Epithelial Cells (non renal): 0 /HPF  § Epithelial Cells (renal): 0       Assessment  · OAB (overactive bladder)     596.51/N32.81    Problems Reconciled  Plan  · Medications  o Medications have been Reconciled  o Transition of Care or Provider Policy  · Instructions  o Patient is happy with Vesicare 5 mg daily. I have discussed dementia with the medication with the patient. We will reevaluate her in 6 months time            Electronically Signed by: Lizz King MD -Author on January 21, 2021 10:59:56 AM

## 2021-05-15 VITALS
WEIGHT: 145 LBS | DIASTOLIC BLOOD PRESSURE: 69 MMHG | BODY MASS INDEX: 23.3 KG/M2 | SYSTOLIC BLOOD PRESSURE: 107 MMHG | HEART RATE: 67 BPM | TEMPERATURE: 98.3 F | HEIGHT: 66 IN

## 2021-05-15 VITALS
TEMPERATURE: 97.9 F | DIASTOLIC BLOOD PRESSURE: 76 MMHG | BODY MASS INDEX: 23.3 KG/M2 | SYSTOLIC BLOOD PRESSURE: 113 MMHG | HEART RATE: 56 BPM | WEIGHT: 145 LBS | HEIGHT: 66 IN

## 2021-05-15 VITALS
HEART RATE: 64 BPM | DIASTOLIC BLOOD PRESSURE: 78 MMHG | WEIGHT: 145 LBS | SYSTOLIC BLOOD PRESSURE: 118 MMHG | BODY MASS INDEX: 23.3 KG/M2 | HEIGHT: 66 IN

## 2021-05-15 VITALS
BODY MASS INDEX: 24.11 KG/M2 | SYSTOLIC BLOOD PRESSURE: 119 MMHG | TEMPERATURE: 98.1 F | HEIGHT: 66 IN | HEART RATE: 66 BPM | WEIGHT: 150 LBS | DIASTOLIC BLOOD PRESSURE: 77 MMHG

## 2021-05-15 VITALS
BODY MASS INDEX: 23.3 KG/M2 | SYSTOLIC BLOOD PRESSURE: 107 MMHG | TEMPERATURE: 98.4 F | HEART RATE: 68 BPM | WEIGHT: 145 LBS | HEIGHT: 66 IN | DIASTOLIC BLOOD PRESSURE: 69 MMHG

## 2021-05-16 VITALS
SYSTOLIC BLOOD PRESSURE: 133 MMHG | BODY MASS INDEX: 24.83 KG/M2 | WEIGHT: 154.5 LBS | HEART RATE: 58 BPM | DIASTOLIC BLOOD PRESSURE: 82 MMHG | HEIGHT: 66 IN

## 2021-05-16 VITALS
RESPIRATION RATE: 16 BRPM | WEIGHT: 151.5 LBS | OXYGEN SATURATION: 98 % | HEART RATE: 65 BPM | TEMPERATURE: 97.9 F | SYSTOLIC BLOOD PRESSURE: 114 MMHG | DIASTOLIC BLOOD PRESSURE: 80 MMHG | BODY MASS INDEX: 24.35 KG/M2 | HEIGHT: 66 IN

## 2021-05-16 VITALS
BODY MASS INDEX: 24.43 KG/M2 | SYSTOLIC BLOOD PRESSURE: 120 MMHG | HEART RATE: 56 BPM | HEIGHT: 66 IN | WEIGHT: 152 LBS | DIASTOLIC BLOOD PRESSURE: 86 MMHG

## 2021-05-16 VITALS
DIASTOLIC BLOOD PRESSURE: 75 MMHG | TEMPERATURE: 98.3 F | WEIGHT: 140 LBS | HEIGHT: 66 IN | HEART RATE: 72 BPM | BODY MASS INDEX: 22.5 KG/M2 | SYSTOLIC BLOOD PRESSURE: 106 MMHG

## 2021-05-16 VITALS
DIASTOLIC BLOOD PRESSURE: 83 MMHG | HEART RATE: 68 BPM | TEMPERATURE: 98.8 F | SYSTOLIC BLOOD PRESSURE: 127 MMHG | BODY MASS INDEX: 23.3 KG/M2 | WEIGHT: 145 LBS | HEIGHT: 66 IN

## 2021-05-16 VITALS
BODY MASS INDEX: 23.95 KG/M2 | SYSTOLIC BLOOD PRESSURE: 115 MMHG | WEIGHT: 149 LBS | DIASTOLIC BLOOD PRESSURE: 80 MMHG | HEIGHT: 66 IN

## 2021-05-16 VITALS
DIASTOLIC BLOOD PRESSURE: 84 MMHG | HEIGHT: 66 IN | WEIGHT: 153.5 LBS | SYSTOLIC BLOOD PRESSURE: 140 MMHG | BODY MASS INDEX: 24.67 KG/M2

## 2021-05-16 VITALS
HEIGHT: 66 IN | SYSTOLIC BLOOD PRESSURE: 117 MMHG | DIASTOLIC BLOOD PRESSURE: 80 MMHG | BODY MASS INDEX: 23.36 KG/M2 | WEIGHT: 145.37 LBS

## 2021-05-16 VITALS
SYSTOLIC BLOOD PRESSURE: 109 MMHG | BODY MASS INDEX: 24.27 KG/M2 | DIASTOLIC BLOOD PRESSURE: 73 MMHG | WEIGHT: 151 LBS | HEART RATE: 65 BPM | HEIGHT: 66 IN | TEMPERATURE: 98.2 F

## 2021-05-16 VITALS
HEIGHT: 66 IN | TEMPERATURE: 98.2 F | SYSTOLIC BLOOD PRESSURE: 105 MMHG | HEART RATE: 65 BPM | DIASTOLIC BLOOD PRESSURE: 73 MMHG | WEIGHT: 153 LBS | BODY MASS INDEX: 24.59 KG/M2

## 2021-05-16 VITALS
HEIGHT: 66 IN | BODY MASS INDEX: 24.43 KG/M2 | WEIGHT: 152 LBS | SYSTOLIC BLOOD PRESSURE: 122 MMHG | DIASTOLIC BLOOD PRESSURE: 75 MMHG

## 2021-06-01 ENCOUNTER — OFFICE VISIT CONVERTED (OUTPATIENT)
Dept: PODIATRY | Facility: CLINIC | Age: 61
End: 2021-06-01
Attending: PODIATRIST

## 2021-06-04 ENCOUNTER — HOSPITAL ENCOUNTER (OUTPATIENT)
Dept: PERIOP | Facility: HOSPITAL | Age: 61
Setting detail: HOSPITAL OUTPATIENT SURGERY
Discharge: HOME | End: 2021-06-04
Attending: PODIATRIST

## 2021-06-05 NOTE — PROGRESS NOTES
Progress Note      Patient Name: Ann Perla   Patient ID: [id number]   Sex: Female   YOB: 1960    Primary Care Provider: Estee DANG   Referring Provider: Preeti DANG    Visit Date: June 1, 2021    Provider: Lawrence Zamorano DPM   Location: [facility]   Location Address: [address] [place] [address]  [id number]   Location Phone: [phone]          Chief Complaint  · Right Foot Pain  · Surgical History and Physical      History Of Present Illness  Ann Perla is a 60 year old /White female who presents to the [facility] today new patient   Patient Name: Ann Perla   Allergies: NO KNOWN DRUG ALLERGIES   Cheif Complaint/HPI: Right base wedge bunionectomy with modified Kovacs. Hammertoe correction of the right second and third toes.   Current Medicaitons: ergocalciferol (vitamin D2) 50,000 unit oral capsule, levothyroxine 50 mcg oral tablet, propranolol 80 mg oral capsule,extended release 24 hr, Seroquel 200 mg oral tablet, simvastatin 20 mg oral tablet, Topamax 100 mg oral tablet, valacyclovir 500 mg oral tablet, Vesicare 5 mg oral tablet, and Viibryd 40 mg oral tablet   Past Medical History: Abdominal Pain, LLQ, Depressive Disorder, Foot pain, right, GERD (gastroesophageal reflux disease), Hallux valgus of right foot, Herpes genitalia, Hiatal hernia, HLD (hyperlipidemia), Migraine, Mixed incontinence urge and stress, OAB (overactive bladder), and Urinary urgency   Relevent Social History: None   Tobacco Use: Does not smoke   Psychological History: Within Normal Limits   HPI     New, Established, New Problem: est  Location: Right bunion  Duration: Greater than 5 years  Onset: Insidious  Nature: Sore  Stable, worsening, improving: Worsening  Aggravating factors:  Patient relates pain is aggravated by shoe gear and ambulation.    Previous Treatment: Bunionectomy in distant past.    Patient denies any  fevers, chills, nausea, vomiting, shortness of breathe, nor any other constitutional signs nor symptoms.    Patient reports the following medical changes since their last visit:    - took COVID-19 vaccinations greater than 2 weeks ago.    Upon discussion of non-surgical conservative option, surgical correction, post-operative requirements along with risk and benefits of the surgery along with expected outcomes, the patient states they would like to proceed with the scheduling surgery.       Past Medical History  Abdominal Pain, LLQ; Depressive Disorder; Foot pain, right; GERD (gastroesophageal reflux disease); Hallux valgus of right foot; Herpes genitalia; Hiatal hernia; HLD (hyperlipidemia); Migraine; Mixed incontinence urge and stress; OAB (overactive bladder); Urinary urgency         Past Surgical History  Cervical Fusion; Colonoscopy; Cystoscopy; EGD; Foot surgery; Hand surgery, left; Hand surgery, right; Hiatal hernia repair; Neck; Retrograde pyelography using cystoscopy; Tubal ligation; Vein Surgery         Medication List  ergocalciferol (vitamin D2) 50,000 unit oral capsule; levothyroxine 50 mcg oral tablet; propranolol 80 mg oral capsule,extended release 24 hr; Seroquel 200 mg oral tablet; simvastatin 20 mg oral tablet; Topamax 100 mg oral tablet; valacyclovir 500 mg oral tablet; Vesicare 5 mg oral tablet; Viibryd 40 mg oral tablet         Allergy List  NO KNOWN DRUG ALLERGIES       Allergies Reconciled  Family Medical History  Stroke; Heart Disease; Cancer, Unspecified; Hypertension; Lung cancer; Renal Calculus; Diabetes         Social History  Alcohol (Former); ; Tobacco (Never); Unemployed         Review of Systems  · Constitutional  o Denies  o : fatigue, night sweats  · Eyes  o Denies  o : double vision, blurred vision  · HENT  o Denies  o : vertigo, recent head injury  · Cardiovascular  o Denies  o : chest pain, irregular heart beats  · Respiratory  o Denies  o : shortness of breath, productive  "cough  · Gastrointestinal  o Denies  o : nausea, vomiting  · Genitourinary  o Denies  o : dysuria, urinary retention  · Integument  o Denies  o : hair growth change, new skin lesions  · Neurologic  o Denies  o : altered mental status, seizures  · Musculoskeletal  o * See HPI  · Endocrine  o Denies  o : cold intolerance, heat intolerance  · Heme-Lymph  o Denies  o : petechiae, lymph node enlargement or tenderness  · Allergic-Immunologic  o Denies  o : frequent illnesses      Vitals  Date Time BP Position Site L\R Cuff Size HR RR TEMP (F) WT  HT  BMI kg/m2 BSA m2 O2 Sat FR L/min FiO2 HC       06/01/2021 09:56 /79 Sitting    69 - R  97.6 145lbs 0oz 5'  6\" 23.4 1.75 99 %            Physical Examination  · Constitutional  o Appearance  o : well developed, well-nourished, no obvious deformities present  · Cardiovascular  o Peripheral Vascular System  o :   § Pedal Pulses  § : pulses 2 + and symmetrical  § Extremities  § : no edema in lower extremities  · Musculoskeletal  o General  o :   § General Musculoskeletal  § : Lower extremity muscle and strength and range of motion is equal and symmetrical bilaterally. The knees are noted to be normal in alignment. Right medial deviation of the first metatarsal with associated lateral deviation of the hallux at the metatarsal phalangeal joint. Nonreducible contracted right second and third toes.  · Skin and Subcutaneous Tissue  o General Inspection  o : Skin is noted to have normal texture and turgor, with no excrescences noted.   o Digits and Nails  o : The toenails are noted to be without disese.  · Neurologic  o Sensation  o : Epicritic sensations intact bilaterally.          Assessment  · Foot pain, right     729.5/M79.671  · Hallux abducto valgus, right     735.0/M20.11  · Preoperative examination     V72.84/Z01.818  · Hammer toe of second toe of right foot     735.4/M20.41  · Hammer toe of right foot     735.4/M20.41      Plan  · Orders  o RONY Report (KASPR) - " V72.84/Z01.818 - 06/01/2021  o Base wedge osteotomy of first metatarsal bone (83047) - - 06/01/2021  o Kovacs bunionectomy (88720) - - 06/01/2021  o Arthrodesis of right second toe (71515) - - 06/01/2021  o Arthrodesis of right third toe (75781) - - 06/01/2021  o 2 - Flexor tenolysis of foot (75755) - - 06/01/2021  o 2 - Capsulotomy of MTP joint (48852) - - 06/01/2021  · Medications  o Medications have been Reconciled  o Transition of Care or Provider Policy  · Instructions  o PLAN: Right base wedge bunionectomy with modified Kovacs. Hammertoe correction of the right second and third toes with K-wires.  o Handouts Provided-Pre-Procedure Instructions including date and time and location of procedure.  o Surgical Facility: [facility]  o ****Surgical Orders****  o ****Patient Status****  o Outpatient  o ********************  o RISK AND BENEFITS:  o Consent for surgery: Given these options, the patient has verbally expressed an understanding of the risks of surgery and finds these risks acceptable. We will proceed with surgery as soon as possible.  o Consult Anesthesia for an post-operative block, or any pain management procedure deemed necessary by the anestesiologist for adequate post-operative pain control.   o O.R. PREP: Per protocol  o IV: Per Anesthesia  o IV: LR@ 75ml/hr  o PLEASE SIGN PERMIT FOR: Right Great toe bunionectomy. Hammertoe correction of the right second and third toes.  o *******************************************  o PRE- OP MEDICATION ORDER:  o *******************************************  o *__Kefzol 2 gram IV on call to OR.  o *___The above History and Physical Examination has been completed within 30 days of admission.  o Pre-Admission Testing Date:   o Follow up post surgery in 4 days.  o Discuss Findings: I have discussed the findings of this evaluation with the patient. The discussion included a complete verbal explanation of any changes in the examination results, diagnosis,  and the current treatment plan. A schedule for future care needs was explained. If any questions should arise after returning home, I have encouraged the patient to feel free to contact Dr. Zamorano. The patient states understanding and agreement with this plan.  o Upon discussion of non-surgical conservative option, surgical correction, post-operative requirements along with risk and benefits of the surgery along with expected outcomes, the patient states they would like to proceed with the scheduling surgery. Preop appointment will be planned. Procedure: Right base wedge bunionectomy, modified Kovacs bunionectomy, hammertoe corrections of the right second and third toes. Below-knee casting.  o Electronically Identified Patient Education Materials Provided Electronically  · Disposition  o Call or Return if symptoms worsen or persist.            Electronically Signed by: Lawrence Zamorano DPM -Author on June 1, 2021 10:20:08 AM

## 2021-06-05 NOTE — PROCEDURES
Patient: KIRTI LAGUERRE     Acct: [id number]     Report: #[id number]  MR #:  [id number]     DOS: 2021 1544     : 1960  DICTATING: RAPHAEL TEMPLETON  ***Signed***  --------------------------------------------------------------------------------------------------------------------  Post Procedure/Operative Note      Date       21            Pre-Operative Diagnosis:      Right Hallux Abductovalgus Deformity  M20.11 & M79.671      Right 2nd hammertoe at the PIPJ, DIPJ, and MPJ      Right 3rd hammertoe at the PIPJ, DIPJ, and MPJ            Post-Operative Diagnosis:      Same as pre-op diagnosis            Surgeon/Assistants      Surgeon      Lona      Assistants      Jah Larios RN, first assist            Anesthesia      MAC            Procedure Performed/Technique      1.  Right base wedge bunionectomy; CPT:  40154        2.  Right modified Kovacs bunionectomy; CPT:  74349        3.  Right below knee cast; CPT:  09929        4.  Arthoplasty of the Right second toe; CPT code:  28285 x 2         5.  MPJ/Capsulotomy Release; CPT:  28270 x 2        6.  Flexor Digitorium Longus Release; CPT:  28232 x 2            Specimen/Tissue Removed:      None            Findings:      None            Nicole left distal forearm D of the right first ray along with nonreducible      contracted right second and third hammertoes            Complications:      No            Estimated Blood Loss:      1 mL            Procedure:      PREOPERATIVE DIAGNOSIS:      Right Hallux Abductovalgus Deformity  M20.11 & M79.671      Right 2nd hammertoe at the PIPJ, DIPJ, and MPJ      Right 3rd hammertoe at the PIPJ, DIPJ, and MPJ             POSTOPERATIVE DIAGNOSIS:      Same as Above              PROCEDURES PERFORMED:      1.  Right base wedge bunionectomy; CPT:  20898        2.  Right modified Kovacs bunionectomy; CPT:  94139        3.  Right below knee cast; CPT:  88188        4.  Arthoplasty of the Right second toe;  CPT code:  28285 x 2         5.  MPJ/Capsulotomy Release; CPT:  28270 x 2      6.  Flexor Digitorium Longus Release; CPT:  28232 x 2             ANESTHESIA:      Local with MAC             HEMOSTASIS:      Well-padded pneumatic calf tourniquet at 250 mmHg x 108 minutes.             ESTIMATED BLOOD LOSS:      1 mL.             SPECIMENS:      None.             DRAINS:      None.             COMPLICATIONS:      None.             SUTURES:      2-0, 3-0 and 4-0 Vicryl and 4-0 Prolene.             INJECTABLES:      20mL of 0.25% Marcaine plain and 20 mL of 1% lidocaine plain.             IMPLANTS:      Two [employer] 28 self-tapping cannulated screws, 2.5 mm in diameter             PREOPERATIVE MEDICATIONS:      2 gram of Ancef was given IV piggyback, 30 minutes prior to tourniquet     inflation.             DESCRIPTION OF PROCEDURE:      Written consent was obtained from the patient prior to any medication or     sedation being administered.             Under mild sedation, the patient was brought into the operating room and placed     on the operating table in a supine position.  A well padded pneumatic ankle calf    tourniquet was placed on the patient's Right calf. Following IV anesthesia,     local anesthesia was obtained around the Right first and second and third rays     utilizing a total of 20 mL of 1% Lidocaine plain. The foot was then scrubbed,     prepped and draped in the usual aseptic manner. An Esmarch bandage was used to     exsanguinate the patient's Right foot and the well padded pneumatic calf     tourniquet was inflated to a pressure of 250 mmHg.             Attention was directed to the medial aspect of the 1st metatarsal head of the     Right foot, where a 10 cm linear longitudinal incision was made medial and     parallel to the 1st metatarsal shaft and above the contour of the deformity.      The incision was deepened through subcutaneous tissues using sharp and blunt     dissection.  Care was taken  to identify and retract all vital neurovascular      structures.  All bleeders were ligated and cauterized as necessary.             At this time, a linear capsulotomy was performed over the medial aspect of the     1st metatarsal phalangeal joint and extended proximally along the dorsal medial     metatarsal shaft.  The periosteal and capsular structures were then carefully     dissected free of their osseous attachments and reflected dorsally and plantarly    thus exposing the head of the 1st metatarsal of the operative      site.              Attention was then directed to the 1st interspace via the original incision     where the dissection was continued to the level of the fibular sesamoid, which     the suspensory ligament was transected.  Lateral capsulotomies were then     performed at the level of the 1st metatarsal phalangeal joint.             Next, utilizing a McGlamry elevator, the plantar, lateral, and medial capsular,     ligamentous, and tendinous attachments of the first metatarsal head were freed     of their osseous attachments.  At this time the lateral contracture present on     the hallux was noted to be reduced and the sesamoid apparatus was noted to float    into a more corrected medial position.             Attention was then redirected to the base of the 1st metatarsal head where a     through and through transverse osteotomy was created in the proximal one third     of the metatarsal shaft utilizing an oscillating bone saw.  Using the plantar     aspect of the cut as a starting point, a through and through osteotomy was made     exiting distal plantarly, immediately proximal to the chondral surface of the     metatarsal head.             Osteotomy was created in the proximal aspect of the dorsal fragment.  The apex     of the osteotomy was medial and directed lateral distally.  Upon completion of     the osteotomy, a wedge of proximal bone was freed and resected from the surgical    site.   Upon completion of the osteotomies, the capital fragment was distracted     and shifted laterally into a more corrected position      and impacted upon the first metatarsal shaft.             Next, following AO principles, two Gwendolyn screws were utilized for fixation.      These were two 2.5 mm self tapping cannulated screws, which were ( )mm and (     )mm, each in length.  Excellent compression and position was noted across the     osteotomy site.  Attention was redirected to the remaining medial bone shelf,     which was resected utilizing the oscillating bone saw and passed from the     operative site.  All bony edges were smoothed with a power saw.   Correction of     the deformity was assessed at this time, and noted to be excellent.             Attention was directed to the medial aspect of the 1st metatarsal head where an     osteotomy was made on the dorsal medial prominence metatarsal head and then     smoothed with the saw.  The wound was then flushed with copious amounts of     sterile and normal saline.  Bone wax was applied to the medial aspect of the     medial head of the 1st metatarsal shaft. All redundant capsular tissue was     resected as necessary.  The wound was flushed with copious amounts of sterile     and normal saline. The periosteal and capsular structures were reapproximated     and coapted utilizing 2-0 and 3-0 Vicryl.  The subcutaneous tissues were     reapproximated and coapted utilizing 4-0 Vicryl.  The skin edges were     reapproximated and coapted utilizing 4-0 Prolene, utilizing horizontal mattress     suture technique.            Attention was directed to the Right foot 2nd toe where A longitudinal incision     was made dorsal to the second digit.  The incision encompassed the proximal     interphalangeal joint and the metatarsal phalangeal joint.  The incision was     deepened through the subcutaneous tissues with a combination of sharp and blunt     dissection with care being  taken to identify and retract all vital neurovascular    structures.  All bleeders were cauterized and ligated as necessary.            At this time a transverse tenotomy and capsulotomy was performed to the proximal    interphalangeal joint.  The head of the proximal phalanx was then freed of its     capsular and ligamentous attachments.  Next utilizing oscillating bone saw the     head of the proximal phalanx was resected and passed from the operative site.            Attention was then directed to the metatarsal phalangeal joint where a tenotomy     capsulotomy was performed at this level.  Next utilizing a McClamary elevator     the plantar plate of the second metatarsal phalangeal joint was released.  The     control correction at the dorsal aspect of the metatarsal phalangeal joint was     noted to be reduced.            Attention was directed to the plantar aspect of the DIP joint.  With use of a     #15 scalpel blade the incision was made down to the flexor tendon.  This was     transected.  Upon completion of this, the contracture at the DIP joint was     reduced.            The wound was flushed copious amounts of sterile normal saline.  The extensor     tendon was reapproximated and coapted utilizing 4-0 Vicryl and the skin was     reapproximated coapted utilizing 5-0 Prolene using a combination of horizontal     mattress suture technique and simple interrupted sutures.            The correction of the deformity was assessed at this time and noted to be     excellent.            This exact surgery was repeated with no additions nor deletions on the Right     third toe.            The tourniquet was deflated with a total tourniquet time of 108 minutes with     prompt hyperemic response seen to toes 1 through 5 on the Right foot.  Upon     completion of the procedure, a postoperative block, consisting of 20 mL of 0.25%    Marcaine plain was injected throughout the surgical site.             The incision was  dressed with Aquacel AG surgical dressing, and covered with a     sterile compressive dressing consisting of Aquacel Ag dressing, 4 x 4, Kerlix,     and Coban.  Prompt hyperemic response was seen to all toes of the Right foot.      Coban was then placed about the left foot and ankle.              A well padded posterior splint cast was applied with Webril Orthoglass and Ace-    wraps.             The patient tolerated the procedure and anesthesia well.  The patient was     transferred to the recovery room with vital signs stable and vascular status     intact to all toes of the Right foot.  Following a period of postoperative     monitoring, the patient will be discharged home having been given written and     oral postoperative instructions.              The surgery was performed with Jah Larios RN, First Assist.  He performed as     a first assist throughout the entire case with retracting, fixation, suturing,     and postoperative dressing.            The patient is to contact Dr. Zamorano for all postoperative followup care and     if any problems should arise.            RAPHAEL ZAMORANO                Jun 4, 2021 15:39      Electronically signed by RAPHAEL ZAMORANO  06/04/2021 17:22     Disclaimer: Converted hospital document may not contain table formatting or lab diagrams. Please see [employer] for authenticated document.

## 2021-06-08 ENCOUNTER — OFFICE VISIT (OUTPATIENT)
Dept: PODIATRY | Facility: CLINIC | Age: 61
End: 2021-06-08

## 2021-06-08 VITALS
WEIGHT: 145 LBS | TEMPERATURE: 97.5 F | BODY MASS INDEX: 23.3 KG/M2 | OXYGEN SATURATION: 100 % | DIASTOLIC BLOOD PRESSURE: 73 MMHG | HEART RATE: 67 BPM | HEIGHT: 66 IN | SYSTOLIC BLOOD PRESSURE: 106 MMHG

## 2021-06-08 DIAGNOSIS — M20.11 HALLUX ABDUCTO VALGUS, RIGHT: ICD-10-CM

## 2021-06-08 DIAGNOSIS — M79.671 FOOT PAIN, RIGHT: ICD-10-CM

## 2021-06-08 DIAGNOSIS — M20.41 HAMMER TOE OF RIGHT FOOT: ICD-10-CM

## 2021-06-08 PROCEDURE — 73630 X-RAY EXAM OF FOOT: CPT | Performed by: PODIATRIST

## 2021-06-08 PROCEDURE — 29405 APPL SHORT LEG CAST: CPT | Performed by: PODIATRIST

## 2021-06-08 PROCEDURE — 99024 POSTOP FOLLOW-UP VISIT: CPT | Performed by: PODIATRIST

## 2021-06-08 RX ORDER — HYDROCODONE BITARTRATE AND ACETAMINOPHEN 5; 325 MG/1; MG/1
1 TABLET ORAL EVERY 6 HOURS PRN
Qty: 30 TABLET | Refills: 0 | Status: SHIPPED | OUTPATIENT
Start: 2021-06-08 | End: 2022-01-19 | Stop reason: ALTCHOICE

## 2021-06-08 NOTE — PROGRESS NOTES
[facility] - PODIATRY    Today's Date: 06/08/21    Patient Name: Ann Perla  MRN: [id number]  [facility]: [id number]  PCP: Estee Figueroa APRN  Referring Provider: No ref. provider found    SUBJECTIVE     Chief Complaint   Patient presents with   • Right Foot - Post-op Follow-up, Hammer Toe     HPI: Ann Perla, a 60 y.o.female, comes to clinic as a(n) est patient for post-op appt 4 days s/p Right base wedge bunionectomy with modified Kovacs, right second and third hammertoe corrections. Denies change in medical history since last visit. Patient is non-diabetic. Patient requests refill for pain medications. Denies any constitutional symptoms. No other pedal complaints at this time.    Patient denies any fevers, chills, nausea, vomiting, shortness of breathe, nor any other constitutional signs nor symptoms.      Past Medical History:   Diagnosis Date   • Abdominal pain, LLQ 12/04/2015   • Depressive disorder    • Foot pain, right 04/08/2021   • GERD (gastroesophageal reflux disease)    • Hallux valgus of right foot 12/03/2018   • Herpes genitalia    • HLD (hyperlipidemia)    • Migraine    • Mixed incontinence urge and stress    • OAB (overactive bladder) 01/24/2018   • Urinary urgency      Past Surgical History:   Procedure Laterality Date   • CERVICAL FUSION     • COLONOSCOPY  2018, 2016   • CYSTOSCOPY     • CYSTOSCOPY RETROGRADE PYELOGRAM     • ENDOSCOPY  2018   • FOOT SURGERY     • HAND SURGERY Left    • HAND SURGERY Right    • HIATAL HERNIA REPAIR  05/10/2019   • NECK SURGERY     • TUBAL ABDOMINAL LIGATION     • VEIN SURGERY       Family History   Problem Relation Age of Onset   • Stroke Mother    • Hypertension Mother    • Heart disease Brother    • Kidney nephrosis Brother    • Hypertension Maternal Grandmother    • Diabetes Maternal Grandmother    • Cancer Maternal Grandfather         Unspecified   • Cancer Other         Unspecified   • Cancer Other         Unspecified   •  Lung cancer Other      Social History     Socioeconomic History   • Marital status:      Spouse name: Not on file   • Number of children: Not on file   • Years of education: Not on file   • Highest education level: Not on file   Tobacco Use   • Smoking status: Never Smoker   • Smokeless tobacco: Never Used   Vaping Use   • Vaping Use: Never used   Substance and Sexual Activity   • Alcohol use: Not Currently     Comment: nemesio- 7-8-19   • Drug use: Never   • Sexual activity: Defer     No Known Allergies  No current outpatient medications on file.     No current facility-administered medications for this visit.     Review of Systems   Musculoskeletal:        Right foot surgery   All other systems reviewed and are negative.      OBJECTIVE     Vitals:    06/08/21 0941   BP: 106/73   Pulse: 67   Temp: 97.5 °F (36.4 °C)   SpO2: 100%       PHYSICAL EXAM  GEN:        Foot/Ankle Exam:       General:   Appearance: appears stated age and healthy    Orientation: AAOx3    Affect: appropriate    Assistance: wheelchair    Shoes: Posterior splint cast on the right foot and lower leg.    VASCULAR      Right Foot Vascularity   Normal vascular exam    Dorsalis pedis:  2+  Posterior tibial:  2+  Skin Temperature: warm    Edema Grading:  None  CFT:  < 3 seconds  Pedal Hair Growth:  Present  Varicosities: none       Left Foot Vascularity   Normal vascular exam    Dorsalis pedis:  2+  Posterior tibial:  2+  Skin Temperature: warm    Edema Grading:  None  CFT:  < 3 seconds  Pedal Hair Growth:  Present  Varicosities: none        NEUROLOGIC     Right Foot Neurologic   Normal sensation    Light touch sensation:  Normal  Vibratory sensation:  Normal  Hot/Cold sensation: normal       Left Foot Neurologic   Normal sensation    Light touch sensation:  Normal  Vibratory sensation:  Normal  Hot/cold sensation: normal        Left Foot Additional Comments: Posterior Splint Cast and the dressing are dry and intact without breakthrough.   Sutures are intact with skin edges well-coapted with no signs of dehiscence.  K wires are in original postop positions of the second and third toes.  No signs of edema, erythema, lymphangitis, nor signs of infection.      Right first second and third toes are normal corrected rectus position.      RADIOLOGY/NUCLEAR:  Radiographs show proximal 1st metatarsal shaft osteotomy which shows a bunionectomy with good post-operative position with screw fixation x 2.  Arthroplasties of the right second and third toes with K wires present in original postop position.  No osteolytic changes seen surrounding screw placement.  No periosteal reactions nor osteolytic lesions seen.  No occult fracture seen.    The patient was placed in a below-knee, fiberglass cast today.  The patient tolerated the procedure without any complications.  Neurovascular status was evaluated post cast application and was within normal limits. The cast was not causing impingement on neuro-vasculature or vital structures.               ASSESSMENT/PLAN     There are no diagnoses linked to this encounter.  Comprehensive lower extremity examination and evaluation was performed.  Discussed findings and treatment plan including risks, benefits, and treatment options with patient in detail. Patient agreed with treatment plan.    Patient is to continue nonweightbearing to right foot with cast and keep cast dry and intact.  The patient states understanding and agreement with this plan.       An After Visit Summary was printed and given to the patient at discharge, including (if requested) any available informative/educational handouts regarding diagnosis, treatment, or medications. All questions were answered to patient/family satisfaction. Should symptoms fail to improve or worsen they agree to call or return to clinic or to go to the Emergency Department. Discussed the importance of following up with any needed screening tests/labs/specialist appointments and  any requested follow-up recommended by me today. Importance of maintaining follow-up discussed and patient accepts that missed appointments can delay diagnosis and potentially lead to worsening of conditions.  No follow-ups on file., or sooner if acute issues arise.        This document has been electronically signed by Lawrence Zamorano DPM on June 8, 2021 10:35 EDT

## 2021-06-16 ENCOUNTER — TRANSCRIBE ORDERS (OUTPATIENT)
Dept: ADMINISTRATIVE | Facility: HOSPITAL | Age: 61
End: 2021-06-16

## 2021-06-16 DIAGNOSIS — Z12.31 SCREENING MAMMOGRAM, ENCOUNTER FOR: Primary | ICD-10-CM

## 2021-06-23 ENCOUNTER — OFFICE VISIT (OUTPATIENT)
Dept: PODIATRY | Facility: CLINIC | Age: 61
End: 2021-06-23

## 2021-06-23 VITALS
BODY MASS INDEX: 22.5 KG/M2 | HEART RATE: 75 BPM | HEIGHT: 66 IN | OXYGEN SATURATION: 98 % | SYSTOLIC BLOOD PRESSURE: 96 MMHG | WEIGHT: 140 LBS | TEMPERATURE: 97.1 F | DIASTOLIC BLOOD PRESSURE: 66 MMHG

## 2021-06-23 DIAGNOSIS — M20.41 HAMMER TOE OF RIGHT FOOT: ICD-10-CM

## 2021-06-23 DIAGNOSIS — M20.11 HALLUX VALGUS OF RIGHT FOOT: ICD-10-CM

## 2021-06-23 DIAGNOSIS — M79.671 FOOT PAIN, RIGHT: Primary | ICD-10-CM

## 2021-06-23 PROCEDURE — 29405 APPL SHORT LEG CAST: CPT | Performed by: PODIATRIST

## 2021-06-23 PROCEDURE — 99024 POSTOP FOLLOW-UP VISIT: CPT | Performed by: PODIATRIST

## 2021-06-23 PROCEDURE — 73630 X-RAY EXAM OF FOOT: CPT | Performed by: PODIATRIST

## 2021-06-23 RX ORDER — SODIUM FLUORIDE 5 MG/ML
PASTE, DENTIFRICE DENTAL AS NEEDED
COMMUNITY
Start: 2021-04-08

## 2021-06-23 RX ORDER — TOPIRAMATE 100 MG/1
100 TABLET, FILM COATED ORAL DAILY
COMMUNITY
Start: 2021-05-25

## 2021-06-23 RX ORDER — QUETIAPINE FUMARATE 200 MG/1
200 TABLET, FILM COATED ORAL
COMMUNITY

## 2021-06-23 RX ORDER — LINACLOTIDE 72 UG/1
72 CAPSULE, GELATIN COATED ORAL
COMMUNITY
Start: 2021-04-13

## 2021-06-23 RX ORDER — PROMETHAZINE HYDROCHLORIDE 25 MG/1
TABLET ORAL AS NEEDED
COMMUNITY
Start: 2021-06-04 | End: 2021-11-29

## 2021-06-23 RX ORDER — ESTRADIOL 10 UG/1
INSERT VAGINAL EVERY OTHER DAY
COMMUNITY
Start: 2021-05-25 | End: 2021-11-29

## 2021-06-23 RX ORDER — DICYCLOMINE HYDROCHLORIDE 10 MG/1
CAPSULE ORAL
COMMUNITY
Start: 2021-04-13 | End: 2021-11-29

## 2021-06-23 RX ORDER — ERGOCALCIFEROL 1.25 MG/1
50000 CAPSULE ORAL
COMMUNITY

## 2021-06-23 RX ORDER — VILAZODONE HYDROCHLORIDE 40 MG/1
40 TABLET ORAL DAILY
COMMUNITY
Start: 2021-05-01 | End: 2022-01-19 | Stop reason: ALTCHOICE

## 2021-06-23 RX ORDER — SIMVASTATIN 20 MG
20 TABLET ORAL NIGHTLY
COMMUNITY
Start: 2021-05-25 | End: 2023-02-14 | Stop reason: SDUPTHER

## 2021-06-23 RX ORDER — SOLIFENACIN SUCCINATE 5 MG/1
5 TABLET, FILM COATED ORAL DAILY
COMMUNITY
End: 2022-01-21 | Stop reason: SDUPTHER

## 2021-06-23 RX ORDER — RANITIDINE 150 MG/1
150 TABLET ORAL
COMMUNITY
End: 2021-11-29

## 2021-06-23 RX ORDER — PANTOPRAZOLE SODIUM 40 MG/1
40 TABLET, DELAYED RELEASE ORAL DAILY
COMMUNITY
End: 2021-11-29

## 2021-06-23 RX ORDER — PRAVASTATIN SODIUM 40 MG
40 TABLET ORAL DAILY
COMMUNITY
End: 2022-07-21

## 2021-06-23 RX ORDER — METRONIDAZOLE 7.5 MG/G
LOTION TOPICAL
COMMUNITY
Start: 2021-04-26 | End: 2021-09-01

## 2021-06-23 RX ORDER — PROPRANOLOL HYDROCHLORIDE 80 MG/1
80 TABLET ORAL 3 TIMES DAILY
COMMUNITY
End: 2021-11-29

## 2021-06-23 RX ORDER — BUPROPION HYDROCHLORIDE 150 MG/1
150 TABLET, EXTENDED RELEASE ORAL
COMMUNITY
End: 2021-11-29

## 2021-06-23 RX ORDER — LEVOTHYROXINE SODIUM 50 MCG
50 TABLET ORAL DAILY
COMMUNITY
Start: 2021-05-25 | End: 2022-01-19 | Stop reason: SDUPTHER

## 2021-06-23 RX ORDER — LIFITEGRAST 50 MG/ML
SOLUTION/ DROPS OPHTHALMIC ONCE
COMMUNITY
Start: 2021-05-04

## 2021-06-23 NOTE — PATIENT INSTRUCTIONS
Patient continue nonweightbearing to right foot.    Patient is to monitor for recurrence and any new symptoms and to contact Dr. Zamorano's office for a follow-up appointment.      The patient states understanding and agreement with this plan.

## 2021-06-23 NOTE — PROGRESS NOTES
[facility] - PODIATRY    Today's Date: 06/23/21    Patient Name: Ann Perla  MRN: [id number]  [facility]: [id number]  PCP: Estee Figueroa APRN  Referring Provider: No ref. provider found    SUBJECTIVE     Chief Complaint   Patient presents with   • Right Foot - Post-op, Follow-up     HPI: Ann Perla, a 60 y.o.female, comes to clinic as a(n) est patient for post-op appt:    Procedure: Right base wedge bunionectomy and second third hammertoe corrections  Date: 04 June 2021    Patient states they are doing well without complications.  Patient states they are following post-op instructions.  Patient states pain is controlled.    Denies change in medical history since last visit. Denies any constitutional symptoms. No other pedal complaints at this time.    Patient denies any fevers, chills, nausea, vomiting, shortness of breathe, nor any other constitutional signs nor symptoms.      Past Medical History:   Diagnosis Date   • Abdominal pain, LLQ 12/04/2015   • Depressive disorder    • Foot pain, right 04/08/2021   • GERD (gastroesophageal reflux disease)    • Hallux valgus of right foot 12/03/2018   • Herpes genitalia    • HLD (hyperlipidemia)    • Migraine    • Mixed incontinence urge and stress    • OAB (overactive bladder) 01/24/2018   • Urinary urgency      Past Surgical History:   Procedure Laterality Date   • CERVICAL FUSION     • COLONOSCOPY  2018, 2016   • CYSTOSCOPY     • CYSTOSCOPY RETROGRADE PYELOGRAM     • ENDOSCOPY  2018   • FOOT SURGERY     • HAND SURGERY Left    • HAND SURGERY Right    • HIATAL HERNIA REPAIR  05/10/2019   • NECK SURGERY     • TUBAL ABDOMINAL LIGATION     • VEIN SURGERY       Family History   Problem Relation Age of Onset   • Stroke Mother    • Hypertension Mother    • Heart disease Brother    • Kidney nephrosis Brother    • Hypertension Maternal Grandmother    • Diabetes Maternal Grandmother    • Cancer Maternal Grandfather         Unspecified   • Cancer  Other         Unspecified   • Cancer Other         Unspecified   • Lung cancer Other      Social History     Socioeconomic History   • Marital status:      Spouse name: Not on file   • Number of children: Not on file   • Years of education: Not on file   • Highest education level: Not on file   Tobacco Use   • Smoking status: Never Smoker   • Smokeless tobacco: Never Used   Vaping Use   • Vaping Use: Never used   Substance and Sexual Activity   • Alcohol use: Not Currently     Comment: nemesio- 7-8-19   • Drug use: Never   • Sexual activity: Defer     No Known Allergies  Current Outpatient Medications   Medication Sig Dispense Refill   • buPROPion SR (WELLBUTRIN SR) 150 MG 12 hr tablet Take 150 mg by mouth.     • dicyclomine (BENTYL) 10 MG capsule      • ergocalciferol (ERGOCALCIFEROL) 1.25 MG (20163 UT) capsule Take 50,000 Units by mouth.     • HYDROcodone-acetaminophen (NORCO) 5-325 MG per tablet Take 1 tablet by mouth Every 6 (Six) Hours As Needed for Other (pain). Take one to two tablets every 4-6 hours prn pain 30 tablet 0   • Linzess 72 MCG capsule capsule      • metroNIDAZOLE (FLAGYL) 0.75 % lotion lotion      • pantoprazole (PROTONIX) 40 MG EC tablet Take 40 mg by mouth Daily.     • pravastatin (PRAVACHOL) 40 MG tablet Take 40 mg by mouth Daily.     • PreviDent 5000 Plus 1.1 % cream      • promethazine (PHENERGAN) 25 MG tablet      • propranolol (INDERAL) 80 MG tablet Take 80 mg by mouth 3 (Three) Times a Day.     • QUEtiapine (SEROquel) 200 MG tablet Take 200 mg by mouth.     • raNITIdine (ZANTAC) 150 MG tablet Take 150 mg by mouth.     • simvastatin (ZOCOR) 20 MG tablet      • solifenacin (VESICARE) 5 MG tablet Take 5 mg by mouth Daily.     • Synthroid 50 MCG tablet      • topiramate (TOPAMAX) 100 MG tablet      • tretinoin (RETIN-A) 0.025 % cream      • Vagifem 10 MCG tablet vaginal tablet      • Viibryd 40 MG tablet tablet      • Voltaren 1 % gel gel      • Xiidra 5 % ophthalmic solution        No  current facility-administered medications for this visit.     Review of Systems   Constitutional: Negative.    Musculoskeletal:        Right foot surgery   Skin:        Postop right foot   All other systems reviewed and are negative.      OBJECTIVE     Vitals:    06/23/21 0804   BP: 96/66   Pulse: 75   Temp: 97.1 °F (36.2 °C)   SpO2: 98%       PHYSICAL EXAM  GEN:        Foot/Ankle Exam:       General:   Appearance: appears stated age and healthy    Orientation: AAOx3    Affect: appropriate    Assistance: wheelchair    Shoes: Below-knee fiberglass cast on the right foot and lower leg.    VASCULAR      Right Foot Vascularity   Normal vascular exam    Dorsalis pedis:  2+  Posterior tibial:  2+  Skin Temperature: warm    Edema Grading:  None  CFT:  < 3 seconds  Pedal Hair Growth:  Present  Varicosities: none       Left Foot Vascularity   Normal vascular exam    Dorsalis pedis:  2+  Posterior tibial:  2+  Skin Temperature: warm    Edema Grading:  None  CFT:  < 3 seconds  Pedal Hair Growth:  Present  Varicosities: none        NEUROLOGIC     Right Foot Neurologic   Normal sensation    Light touch sensation:  Normal  Vibratory sensation:  Normal  Hot/Cold sensation: normal       Left Foot Neurologic   Normal sensation    Light touch sensation:  Normal  Vibratory sensation:  Normal  Hot/cold sensation: normal        Right Foot Additional Comments Below-knee fiberglass cast and the dressing are dry and intact without breakthrough.  Sutures are intact with skin edges well-coapted with no signs of dehiscence.  K wires are in original postop positions of the second and third toes.  No signs of edema, erythema, lymphangitis, nor signs of infection.      Right first second and third toes are normal corrected rectus position.    Upon removal of sutures, skin edges remain well-coapted with no signs of dehiscence.          RADIOLOGY:  Radiographs show proximal 1st metatarsal shaft osteotomy which shows change in position of the  distal fragment in the first metatarsal shaft with a slight loss of correction seen in the AP view.  In all other views the bunionectomy with good post-operative position with screw fixation x 2.  Arthroplasties of the right second and third toes with K wires present in original postop position.  No osteolytic changes seen surrounding screw placement.  No periosteal reactions nor osteolytic lesions seen.  No occult fracture seen.    The patient was placed in a below-knee, fiberglass cast today.  The patient tolerated the procedure without any complications.  Neurovascular status was evaluated post cast application and was within normal limits. The cast was not causing impingement on neuro-vasculature or vital structures.               ASSESSMENT/PLAN     Diagnoses and all orders for this visit:    1. Foot pain, right (Primary)    2. Hammer toe of right foot    3. Hallux valgus of right foot      Comprehensive lower extremity examination and evaluation was performed.  Discussed findings and treatment plan including risks, benefits, and treatment options with patient in detail. Patient agreed with treatment plan.    Patient is to continue nonweightbearing to right foot with cast and keep cast dry and intact.  The patient states understanding and agreement with this plan.       An After Visit Summary was printed and given to the patient at discharge, including (if requested) any available informative/educational handouts regarding diagnosis, treatment, or medications. All questions were answered to patient/family satisfaction. Should symptoms fail to improve or worsen they agree to call or return to clinic or to go to the Emergency Department. Discussed the importance of following up with any needed screening tests/labs/specialist appointments and any requested follow-up recommended by me today. Importance of maintaining follow-up discussed and patient accepts that missed appointments can delay diagnosis and  potentially lead to worsening of conditions.  Return in about 3 weeks (around 7/14/2021), or X-ray, remove K wires , application of third cast, for Post-Procedure., or sooner if acute issues arise.        This document has been electronically signed by Lawrence Zamorano DPM on June 23, 2021 08:44 EDT

## 2021-07-14 ENCOUNTER — OFFICE VISIT (OUTPATIENT)
Dept: PODIATRY | Facility: CLINIC | Age: 61
End: 2021-07-14

## 2021-07-14 VITALS
BODY MASS INDEX: 32.4 KG/M2 | SYSTOLIC BLOOD PRESSURE: 110 MMHG | WEIGHT: 140 LBS | HEART RATE: 69 BPM | OXYGEN SATURATION: 100 % | TEMPERATURE: 97.3 F | HEIGHT: 55 IN | DIASTOLIC BLOOD PRESSURE: 71 MMHG

## 2021-07-14 DIAGNOSIS — M79.671 FOOT PAIN, RIGHT: Primary | ICD-10-CM

## 2021-07-14 DIAGNOSIS — M20.11 HALLUX VALGUS OF RIGHT FOOT: ICD-10-CM

## 2021-07-14 DIAGNOSIS — M20.41 HAMMER TOE OF RIGHT FOOT: ICD-10-CM

## 2021-07-14 PROCEDURE — 73630 X-RAY EXAM OF FOOT: CPT | Performed by: PODIATRIST

## 2021-07-14 PROCEDURE — 29405 APPL SHORT LEG CAST: CPT | Performed by: PODIATRIST

## 2021-07-14 PROCEDURE — 99024 POSTOP FOLLOW-UP VISIT: CPT | Performed by: PODIATRIST

## 2021-07-14 NOTE — PATIENT INSTRUCTIONS
Patient remain nonweightbearing to right foot and keep cast dry and intact.    Patient is to monitor for recurrence and any new symptoms and to contact Dr. Zamorano's office for a follow-up appointment.      The patient states understanding and agreement with this plan.

## 2021-07-14 NOTE — PROGRESS NOTES
[facility] - PODIATRY    Today's Date: 07/14/21    Patient Name: Ann Perla  MRN: [id number]  [facility]: [id number]  PCP: Estee Figueroa APRN  Referring Provider: No ref. provider found    SUBJECTIVE     Chief Complaint   Patient presents with   • Right Foot - Follow-up, Post-op     HPI: Ann Perla, a 60 y.o.female, comes to clinic as a(n) est patient for post-op appt:    Procedure: Right base wedge bunionectomy and second third hammertoe corrections  Date: 04 June 2021    Patient states they are doing well without complications.  Patient states they are following post-op instructions.  Patient states pain is controlled.    Denies change in medical history since last visit. Denies any constitutional symptoms. No other pedal complaints at this time.    Patient states her foot is feeling better and looks better.    Patient denies any fevers, chills, nausea, vomiting, shortness of breathe, nor any other constitutional signs nor symptoms.      Past Medical History:   Diagnosis Date   • Abdominal pain, LLQ 12/04/2015   • Depressive disorder    • Foot pain, right 04/08/2021   • GERD (gastroesophageal reflux disease)    • Hallux valgus of right foot 12/03/2018   • Herpes genitalia    • HLD (hyperlipidemia)    • Migraine    • Mixed incontinence urge and stress    • OAB (overactive bladder) 01/24/2018   • Urinary urgency      Past Surgical History:   Procedure Laterality Date   • CERVICAL FUSION     • COLONOSCOPY  2018, 2016   • CYSTOSCOPY     • CYSTOSCOPY RETROGRADE PYELOGRAM     • ENDOSCOPY  2018   • FOOT SURGERY     • HAND SURGERY Left    • HAND SURGERY Right    • HIATAL HERNIA REPAIR  05/10/2019   • NECK SURGERY     • TUBAL ABDOMINAL LIGATION     • VEIN SURGERY       Family History   Problem Relation Age of Onset   • Stroke Mother    • Hypertension Mother    • Heart disease Brother    • Kidney nephrosis Brother    • Hypertension Maternal Grandmother    • Diabetes Maternal Grandmother     • Cancer Maternal Grandfather         Unspecified   • Cancer Other         Unspecified   • Cancer Other         Unspecified   • Lung cancer Other      Social History     Socioeconomic History   • Marital status:      Spouse name: Not on file   • Number of children: Not on file   • Years of education: Not on file   • Highest education level: Not on file   Tobacco Use   • Smoking status: Never Smoker   • Smokeless tobacco: Never Used   Vaping Use   • Vaping Use: Never used   Substance and Sexual Activity   • Alcohol use: Not Currently     Comment: nemesio- 7-8-19   • Drug use: Never   • Sexual activity: Defer     No Known Allergies  Current Outpatient Medications   Medication Sig Dispense Refill   • buPROPion SR (WELLBUTRIN SR) 150 MG 12 hr tablet Take 150 mg by mouth.     • dicyclomine (BENTYL) 10 MG capsule      • ergocalciferol (ERGOCALCIFEROL) 1.25 MG (15950 UT) capsule Take 50,000 Units by mouth.     • HYDROcodone-acetaminophen (NORCO) 5-325 MG per tablet Take 1 tablet by mouth Every 6 (Six) Hours As Needed for Other (pain). Take one to two tablets every 4-6 hours prn pain 30 tablet 0   • Linzess 72 MCG capsule capsule      • metroNIDAZOLE (FLAGYL) 0.75 % lotion lotion      • pantoprazole (PROTONIX) 40 MG EC tablet Take 40 mg by mouth Daily.     • pravastatin (PRAVACHOL) 40 MG tablet Take 40 mg by mouth Daily.     • PreviDent 5000 Plus 1.1 % cream      • promethazine (PHENERGAN) 25 MG tablet      • propranolol (INDERAL) 80 MG tablet Take 80 mg by mouth 3 (Three) Times a Day.     • propranolol XL (INNOPRAN XL) 80 MG 24 hr capsule Take 80 mg by mouth Daily.     • QUEtiapine (SEROquel) 200 MG tablet Take 200 mg by mouth.     • raNITIdine (ZANTAC) 150 MG tablet Take 150 mg by mouth.     • simvastatin (ZOCOR) 20 MG tablet      • solifenacin (VESICARE) 5 MG tablet Take 5 mg by mouth Daily.     • Synthroid 50 MCG tablet      • topiramate (TOPAMAX) 100 MG tablet      • tretinoin (RETIN-A) 0.025 % cream      •  Vagifem 10 MCG tablet vaginal tablet      • Viibryd 40 MG tablet tablet      • Voltaren 1 % gel gel      • Xiidra 5 % ophthalmic solution        No current facility-administered medications for this visit.     Review of Systems   Constitutional: Negative.    Musculoskeletal:        Right foot surgery   Skin:        Postop right foot   All other systems reviewed and are negative.      OBJECTIVE     Vitals:    07/14/21 0759   BP: 110/71   Pulse: 69   Temp: 97.3 °F (36.3 °C)   SpO2: 100%       PHYSICAL EXAM  GEN:        Foot/Ankle Exam:       General:   Appearance: appears stated age and healthy    Orientation: AAOx3    Affect: appropriate    Assistance: wheelchair    Shoes: Below-knee fiberglass cast on the right foot and lower leg.    VASCULAR      Right Foot Vascularity   Normal vascular exam    Dorsalis pedis:  2+  Posterior tibial:  2+  Skin Temperature: warm    Edema Grading:  None  CFT:  < 3 seconds  Pedal Hair Growth:  Present  Varicosities: none       Left Foot Vascularity   Normal vascular exam    Dorsalis pedis:  2+  Posterior tibial:  2+  Skin Temperature: warm    Edema Grading:  None  CFT:  < 3 seconds  Pedal Hair Growth:  Present  Varicosities: none        NEUROLOGIC     Right Foot Neurologic   Normal sensation    Light touch sensation:  Normal  Vibratory sensation:  Normal  Hot/Cold sensation: normal       Left Foot Neurologic   Normal sensation    Light touch sensation:  Normal  Vibratory sensation:  Normal  Hot/cold sensation: normal        Right Foot Additional Comments Below-knee fiberglass cast and the dressing are dry and intact without breakthrough.  Sutures are intact with skin edges well-coapted with no signs of dehiscence nor hypertrophic skin formation.  K wires are in original postop positions of the second and third toes.  No signs of edema, erythema, lymphangitis, nor signs of infection.      Right first second and third toes are normal corrected rectus position.    Upon removal of K wires,  the second and third toes remain in corrected position.           RADIOLOGY:  IN-OFFICE IMAGING:  3 view, AP, MO, Lateral, right foot.  Postop imaging.  Radiographs show proximal 1st metatarsal shaft osteotomy which show no other changes seen in position of the distal fragment in the first metatarsal shaft.  Images taken on last visit    Arthroplasties of the right second and third toes with K wires present in original postop position.  Early trabeculation seen across osteotomy sites.  No osteolytic changes seen surrounding screw placement.  No periosteal reactions nor osteolytic lesions seen.  No occult fracture seen.    A second set of radiographs were taken of 3 views, AP, MO, lateral of the right foot were taken to compare to previous radiographs after removal of K wires from second and third toes.  No change in position of second and third rays.  No other changes seen.      The patient was placed in a below-knee, fiberglass cast today.  The patient tolerated the procedure without any complications.  Neurovascular status was evaluated post cast application and was within normal limits. The cast was not causing impingement on neuro-vasculature or vital structures.               ASSESSMENT/PLAN     Diagnoses and all orders for this visit:    1. Foot pain, right (Primary)    2. Hallux valgus of right foot    3. Hammer toe of right foot      Comprehensive lower extremity examination and evaluation was performed.  Discussed findings and treatment plan including risks, benefits, and treatment options with patient in detail. Patient agreed with treatment plan.    Patient is to continue nonweightbearing to right foot with cast and keep cast dry and intact.  The patient states understanding and agreement with this plan.       An After Visit Summary was printed and given to the patient at discharge, including (if requested) any available informative/educational handouts regarding diagnosis, treatment, or medications. All  questions were answered to patient/family satisfaction. Should symptoms fail to improve or worsen they agree to call or return to clinic or to go to the Emergency Department. Discussed the importance of following up with any needed screening tests/labs/specialist appointments and any requested follow-up recommended by me today. Importance of maintaining follow-up discussed and patient accepts that missed appointments can delay diagnosis and potentially lead to worsening of conditions.  Return in about 2 weeks (around 7/28/2021), or Cast removal, x-rays, transition to partial weightbearing with no impact.., or sooner if acute issues arise.        This document has been electronically signed by Lawrence Zamorano DPM on July 14, 2021 09:04 EDT

## 2021-07-15 VITALS
DIASTOLIC BLOOD PRESSURE: 79 MMHG | OXYGEN SATURATION: 99 % | HEART RATE: 69 BPM | BODY MASS INDEX: 23.3 KG/M2 | WEIGHT: 145 LBS | TEMPERATURE: 97.6 F | HEIGHT: 66 IN | SYSTOLIC BLOOD PRESSURE: 105 MMHG

## 2021-07-19 PROBLEM — M50.20 CERVICAL DISC HERNIATION: Status: ACTIVE | Noted: 2017-05-16

## 2021-07-19 PROBLEM — N18.2 CHRONIC KIDNEY DISEASE, STAGE 2 (MILD): Status: ACTIVE | Noted: 2019-03-05

## 2021-07-21 ENCOUNTER — OFFICE VISIT (OUTPATIENT)
Dept: UROLOGY | Facility: CLINIC | Age: 61
End: 2021-07-21

## 2021-07-21 VITALS
WEIGHT: 142 LBS | TEMPERATURE: 97.8 F | DIASTOLIC BLOOD PRESSURE: 63 MMHG | SYSTOLIC BLOOD PRESSURE: 94 MMHG | BODY MASS INDEX: 22.82 KG/M2 | HEART RATE: 59 BPM | HEIGHT: 66 IN

## 2021-07-21 DIAGNOSIS — N32.81 OAB (OVERACTIVE BLADDER): Primary | ICD-10-CM

## 2021-07-21 LAB
BACTERIA UR QL AUTO: NORMAL /HPF
BILIRUB BLD-MCNC: NEGATIVE MG/DL
CLARITY, POC: CLEAR
COLOR UR: YELLOW
EPI CELLS #/AREA URNS HPF: 0 /[HPF]
GLUCOSE UR STRIP-MCNC: NEGATIVE MG/DL
KETONES UR QL: NEGATIVE
LEUKOCYTE EST, POC: NEGATIVE
NITRITE UR-MCNC: NEGATIVE MG/ML
PH UR: 5.5 [PH] (ref 5–8)
PROT UR STRIP-MCNC: NEGATIVE MG/DL
RBC # UR STRIP: NEGATIVE /UL
RBC # UR STRIP: NORMAL /HPF
RENAL EPITHELIAL, POC: 0
SP GR UR: 1.01 (ref 1–1.03)
UNIDENT CRYS URNS QL MICRO: NORMAL /HPF
UROBILINOGEN UR QL: NORMAL
WBC # UR STRIP: NORMAL /HPF

## 2021-07-21 PROCEDURE — 81003 URINALYSIS AUTO W/O SCOPE: CPT | Performed by: UROLOGY

## 2021-07-21 PROCEDURE — 99212 OFFICE O/P EST SF 10 MIN: CPT | Performed by: UROLOGY

## 2021-07-21 NOTE — PROGRESS NOTES
"Chief Complaint  Follow-up (6 month) for over active urinary bladder    Subjective          Ann Perla presents to [facility] UROLOGY  History of Present Illness    Patient has frequency and urgency syndrome.  She is doing very well with Vesicare 5 mg daily.  Patient is pleased with the medication and does not have any dryness of her mouth.  She has been constipated for a long time even before starting Vesicare.  No side effect from the medicine.  No dysuria or gross hematuria    Objective   Vital Signs:   BP 94/63 (BP Location: Right arm, Patient Position: Sitting, Cuff Size: Small Adult)   Pulse 59   Temp 97.8 °F (36.6 °C)   Ht 167.6 cm (66\")   Wt 64.4 kg (142 lb)   BMI 22.92 kg/m²     No Known Allergies   Review of Systems   Constitutional: Negative.    HENT: Negative.    Respiratory: Negative.    Cardiovascular: Negative.    Gastrointestinal: Positive for constipation.   Endocrine: Negative.    Genitourinary: Positive for urgency.   Musculoskeletal: Positive for arthralgias, back pain, gait problem, joint swelling, myalgias, neck pain and neck stiffness.   Allergic/Immunologic: Negative.    Hematological: Negative.    Psychiatric/Behavioral: Negative.         Physical Exam    60-year-old white female who is in no acute distress    Neck bilaterally symmetrical.  Scar for the neck operation    Cardiovascular system.  First second heart sound is present regular no murmur    Lungs.  Air entry is good breathing vesicular no rales rhonchi present    Abdomen.  No real tenderness or masses present.  She had a recent fall and has tender muscles on the left side    Extremities and spines are normal  Result Review :                 Assessment and Plan    Diagnoses and all orders for this visit:    1. OAB (overactive bladder) (Primary)  -     POC Urinalysis Dipstick, Automated    Patient is doing very well with Vesicare 5 mg daily and will continue that    Follow Up   Return in about 6 " months (around 1/21/2022).  Patient was given instructions and counseling regarding her condition or for health maintenance advice. Please see specific information pulled into the AVS if appropriate.     Lizz King MD

## 2021-07-28 ENCOUNTER — OFFICE VISIT (OUTPATIENT)
Dept: PODIATRY | Facility: CLINIC | Age: 61
End: 2021-07-28

## 2021-07-28 VITALS — BODY MASS INDEX: 22.92 KG/M2 | HEIGHT: 66 IN | HEART RATE: 66 BPM | OXYGEN SATURATION: 100 % | TEMPERATURE: 97.5 F

## 2021-07-28 DIAGNOSIS — M79.672 FOOT PAIN, LEFT: Primary | ICD-10-CM

## 2021-07-28 DIAGNOSIS — M20.41 HAMMER TOE OF RIGHT FOOT: ICD-10-CM

## 2021-07-28 DIAGNOSIS — M20.11 HALLUX VALGUS OF RIGHT FOOT: ICD-10-CM

## 2021-07-28 PROCEDURE — 99024 POSTOP FOLLOW-UP VISIT: CPT | Performed by: PODIATRIST

## 2021-07-28 PROCEDURE — 73630 X-RAY EXAM OF FOOT: CPT | Performed by: PODIATRIST

## 2021-07-28 RX ORDER — CYCLOBENZAPRINE HCL 5 MG
5 TABLET ORAL 2 TIMES DAILY PRN
COMMUNITY
Start: 2021-07-26 | End: 2022-01-19 | Stop reason: ALTCHOICE

## 2021-07-28 NOTE — PROGRESS NOTES
[facility] - PODIATRY    Today's Date: 07/28/21    Patient Name: Ann Perla  MRN: [id number]  [facility]: [id number]  PCP: Estee Figueroa APRN  Referring Provider: No ref. provider found    SUBJECTIVE     Chief Complaint   Patient presents with   • Right Foot - Bunions, Post-op Follow-up     HPI: Ann Perla, a 60 y.o.female, comes to clinic as a(n) est patient for post-op appt:    Procedure: Right base wedge bunionectomy and second third hammertoe corrections  Date: 04 June 2021    Patient states they are doing well without complications.  Patient states they are following post-op instructions.  Patient states pain is controlled.    Denies change in medical history since last visit. Denies any constitutional symptoms. No other pedal complaints at this time.    Patient states her foot is feeling better and looks better.    Patient denies any fevers, chills, nausea, vomiting, shortness of breathe, nor any other constitutional signs nor symptoms.      Past Medical History:   Diagnosis Date   • Abdominal pain, LLQ 12/04/2015   • Bunion    • Depressive disorder    • Foot pain, right 04/08/2021   • GERD (gastroesophageal reflux disease)    • Hallux valgus of right foot 12/03/2018   • Herpes genitalia    • HLD (hyperlipidemia)    • Migraine    • Mixed incontinence urge and stress    • OAB (overactive bladder) 01/24/2018   • Urinary urgency      Past Surgical History:   Procedure Laterality Date   • CERVICAL FUSION     • COLONOSCOPY  2018, 2016   • CYSTOSCOPY     • CYSTOSCOPY RETROGRADE PYELOGRAM     • ENDOSCOPY  2018   • FOOT SURGERY     • HAND SURGERY Left    • HAND SURGERY Right    • HIATAL HERNIA REPAIR  05/10/2019   • NECK SURGERY     • TUBAL ABDOMINAL LIGATION     • VEIN SURGERY       Family History   Problem Relation Age of Onset   • Stroke Mother    • Hypertension Mother    • Heart disease Brother    • Kidney nephrosis Brother    • Hypertension Maternal Grandmother    • Diabetes  Maternal Grandmother    • Cancer Maternal Grandfather         Unspecified   • Cancer Other         Unspecified   • Cancer Other         Unspecified   • Lung cancer Other      Social History     Socioeconomic History   • Marital status:      Spouse name: Not on file   • Number of children: Not on file   • Years of education: Not on file   • Highest education level: Not on file   Tobacco Use   • Smoking status: Never Smoker   • Smokeless tobacco: Never Used   Vaping Use   • Vaping Use: Never used   Substance and Sexual Activity   • Alcohol use: Not Currently     Comment: nemesio- 7-8-19   • Drug use: Never   • Sexual activity: Defer     No Known Allergies  Current Outpatient Medications   Medication Sig Dispense Refill   • buPROPion SR (WELLBUTRIN SR) 150 MG 12 hr tablet Take 150 mg by mouth.     • Calcium Carbonate-Vitamin D (calcium-vitamin D) 500-200 MG-UNIT tablet per tablet 500 mg Daily.     • cyclobenzaprine (FLEXERIL) 5 MG tablet 5 mg 2 (Two) Times a Day As Needed.     • dicyclomine (BENTYL) 10 MG capsule      • ergocalciferol (ERGOCALCIFEROL) 1.25 MG (06193 UT) capsule Take 50,000 Units by mouth.     • HYDROcodone-acetaminophen (NORCO) 5-325 MG per tablet Take 1 tablet by mouth Every 6 (Six) Hours As Needed for Other (pain). Take one to two tablets every 4-6 hours prn pain 30 tablet 0   • Linzess 72 MCG capsule capsule 72 mcg Every Morning Before Breakfast.     • metroNIDAZOLE (FLAGYL) 0.75 % lotion lotion      • pantoprazole (PROTONIX) 40 MG EC tablet Take 40 mg by mouth Daily.     • pravastatin (PRAVACHOL) 40 MG tablet Take 40 mg by mouth Daily.     • PreviDent 5000 Plus 1.1 % cream      • promethazine (PHENERGAN) 25 MG tablet As Needed.     • propranolol (INDERAL) 80 MG tablet Take 80 mg by mouth 3 (Three) Times a Day.     • propranolol XL (INNOPRAN XL) 80 MG 24 hr capsule Take 80 mg by mouth Daily.     • QUEtiapine (SEROquel) 200 MG tablet Take 200 mg by mouth.     • raNITIdine (ZANTAC) 150 MG tablet  Take 150 mg by mouth.     • simvastatin (ZOCOR) 20 MG tablet 20 mg Every Night.     • solifenacin (VESICARE) 5 MG tablet Take 5 mg by mouth Daily.     • Synthroid 50 MCG tablet Take 50 mcg by mouth Daily.     • topiramate (TOPAMAX) 100 MG tablet 100 mg Daily.     • tretinoin (RETIN-A) 0.025 % cream      • Vagifem 10 MCG tablet vaginal tablet      • Viibryd 40 MG tablet tablet 40 mg Daily.     • Voltaren 1 % gel gel      • Xiidra 5 % ophthalmic solution        No current facility-administered medications for this visit.     Review of Systems   Constitutional: Negative.    Musculoskeletal:        Right foot surgery   Skin:        Postop right foot   All other systems reviewed and are negative.      OBJECTIVE     Vitals:    07/28/21 0910   Pulse: 66   Temp: 97.5 °F (36.4 °C)   SpO2: 100%       PHYSICAL EXAM  GEN:        Foot/Ankle Exam:       General:   Appearance: appears stated age and healthy    Orientation: AAOx3    Affect: appropriate    Assistance: wheelchair    Shoes: Below-knee fiberglass cast on the right foot and lower leg.    VASCULAR      Right Foot Vascularity   Normal vascular exam    Dorsalis pedis:  2+  Posterior tibial:  2+  Skin Temperature: warm    Edema Grading:  None  CFT:  < 3 seconds  Pedal Hair Growth:  Present  Varicosities: none       Left Foot Vascularity   Normal vascular exam    Dorsalis pedis:  2+  Posterior tibial:  2+  Skin Temperature: warm    Edema Grading:  None  CFT:  < 3 seconds  Pedal Hair Growth:  Present  Varicosities: none        NEUROLOGIC     Right Foot Neurologic   Normal sensation    Light touch sensation:  Normal  Vibratory sensation:  Normal  Hot/Cold sensation: normal       Left Foot Neurologic   Normal sensation    Light touch sensation:  Normal  Vibratory sensation:  Normal  Hot/cold sensation: normal        Right Foot Additional Comments Below-knee fiberglass cast and the dressing are dry and intact without breakthrough.      Upon removal of cast there were no pressure  lesions seen.    Skin edges well-coapted with no signs of dehiscence.  No hypertrophic scar formation.    No signs of edema, erythema, lymphangitis, nor signs of infection.      Right first second and third toes are normal corrected rectus position.      RADIOLOGY:  XR Foot 3+ View Right    Result Date: 7/28/2021  Narrative: IN-OFFICE IMAGING:  3 view, AP, MO, Lateral, right foot.  Postop imaging.  Radiographs show proximal 1st metatarsal shaft osteotomy which show no other changes seen in position of the distal fragment in the first metatarsal shaft compared to images taken on last visit.    Arthroplasties of the right second and third toes remain in original postop position.  Increase in trabeculation seen across osteotomy sites.  No osteolytic changes seen surrounding screw placement.  No periosteal reactions nor osteolytic lesions seen.  No occult fracture seen.  No other changes seen compared to previous views.         ASSESSMENT/PLAN     Diagnoses and all orders for this visit:    1. Foot pain, left (Primary)    2. Hammer toe of right foot    3. Hallux valgus of right foot      Comprehensive lower extremity examination and evaluation was performed.  Discussed findings and treatment plan including risks, benefits, and treatment options with patient in detail. Patient agreed with treatment plan.    Patient is transition to weightbearing with surgical shoe as tolerated on her left foot.  Dr. Zamorano advised patient may resume driving, but advised not to drive while wearing an ambulatory device.  Advised quick/hard depression of brakes could cause injury to areas.  Also advised of possible legal implications while driving in restrictive device.  The patient states understanding and agreement with these instructions.         An After Visit Summary was printed and given to the patient at discharge, including (if requested) any available informative/educational handouts regarding diagnosis, treatment, or  medications. All questions were answered to patient/family satisfaction. Should symptoms fail to improve or worsen they agree to call or return to clinic or to go to the Emergency Department. Discussed the importance of following up with any needed screening tests/labs/specialist appointments and any requested follow-up recommended by me today. Importance of maintaining follow-up discussed and patient accepts that missed appointments can delay diagnosis and potentially lead to worsening of conditions.    Return in about 2 weeks (around 8/11/2021) for Post Operative: X-rays, transition to regular shoe gear without impact x1 month.., or sooner if acute issues arise.        This document has been electronically signed by Lawrence Zamorano DPM on July 28, 2021 10:22 EDT

## 2021-08-12 ENCOUNTER — OFFICE VISIT (OUTPATIENT)
Dept: PODIATRY | Facility: CLINIC | Age: 61
End: 2021-08-12

## 2021-08-12 VITALS
HEIGHT: 66 IN | BODY MASS INDEX: 22.82 KG/M2 | TEMPERATURE: 97.6 F | OXYGEN SATURATION: 100 % | WEIGHT: 142 LBS | HEART RATE: 69 BPM | DIASTOLIC BLOOD PRESSURE: 64 MMHG | SYSTOLIC BLOOD PRESSURE: 95 MMHG

## 2021-08-12 DIAGNOSIS — M20.11 HALLUX VALGUS OF RIGHT FOOT: ICD-10-CM

## 2021-08-12 DIAGNOSIS — M20.41 HAMMER TOE OF RIGHT FOOT: ICD-10-CM

## 2021-08-12 DIAGNOSIS — M79.671 FOOT PAIN, RIGHT: Primary | ICD-10-CM

## 2021-08-12 PROCEDURE — 99024 POSTOP FOLLOW-UP VISIT: CPT | Performed by: PODIATRIST

## 2021-08-12 NOTE — PROGRESS NOTES
[facility]    Today's Date: 08/12/21    Patient Name: Ann Perla  MRN: [id number]  [facility]: [id number]  PCP: Estee Figueroa APRN  Referring Provider: No ref. provider found    SUBJECTIVE     Chief Complaint   Patient presents with   • Right Foot - Follow-up, Post-op     HPI: Ann Perla, a 60 y.o.female, comes to clinic as a(n) est patient for post-op appt:    Procedure: Right base wedge bunionectomy and second third hammertoe corrections  Date: 04 June 2021    Patient states they are doing well without complications.  Patient states they are following post-op instructions.  Patient states pain is controlled.    Denies change in medical history since last visit. Denies any constitutional symptoms. No other pedal complaints at this time.    Patient states her foot is feeling better and looks better.    Patient denies any fevers, chills, nausea, vomiting, shortness of breathe, nor any other constitutional signs nor symptoms.      Past Medical History:   Diagnosis Date   • Abdominal pain, LLQ 12/04/2015   • Bunion    • Depressive disorder    • Foot pain, right 04/08/2021   • GERD (gastroesophageal reflux disease)    • Hallux valgus of right foot 12/03/2018   • Herpes genitalia    • HLD (hyperlipidemia)    • Migraine    • Mixed incontinence urge and stress    • OAB (overactive bladder) 01/24/2018   • Urinary urgency      Past Surgical History:   Procedure Laterality Date   • CERVICAL FUSION     • COLONOSCOPY  2018, 2016   • CYSTOSCOPY     • CYSTOSCOPY RETROGRADE PYELOGRAM     • ENDOSCOPY  2018   • FOOT SURGERY     • HAND SURGERY Left    • HAND SURGERY Right    • HIATAL HERNIA REPAIR  05/10/2019   • NECK SURGERY     • TUBAL ABDOMINAL LIGATION     • VEIN SURGERY       Family History   Problem Relation Age of Onset   • Stroke Mother    • Hypertension Mother    • Heart disease Brother    • Kidney nephrosis Brother    • Hypertension Maternal Grandmother    • Diabetes Maternal  Grandmother    • Cancer Maternal Grandfather         Unspecified   • Cancer Other         Unspecified   • Cancer Other         Unspecified   • Lung cancer Other      Social History     Socioeconomic History   • Marital status:      Spouse name: Not on file   • Number of children: Not on file   • Years of education: Not on file   • Highest education level: Not on file   Tobacco Use   • Smoking status: Never Smoker   • Smokeless tobacco: Never Used   Vaping Use   • Vaping Use: Never used   Substance and Sexual Activity   • Alcohol use: Not Currently     Comment: nemesio- 7-8-19   • Drug use: Never   • Sexual activity: Defer     No Known Allergies  Current Outpatient Medications   Medication Sig Dispense Refill   • buPROPion SR (WELLBUTRIN SR) 150 MG 12 hr tablet Take 150 mg by mouth.     • Calcium Carbonate-Vitamin D (calcium-vitamin D) 500-200 MG-UNIT tablet per tablet 500 mg Daily.     • cyclobenzaprine (FLEXERIL) 5 MG tablet 5 mg 2 (Two) Times a Day As Needed.     • dicyclomine (BENTYL) 10 MG capsule      • ergocalciferol (ERGOCALCIFEROL) 1.25 MG (38199 UT) capsule Take 50,000 Units by mouth.     • HYDROcodone-acetaminophen (NORCO) 5-325 MG per tablet Take 1 tablet by mouth Every 6 (Six) Hours As Needed for Other (pain). Take one to two tablets every 4-6 hours prn pain 30 tablet 0   • Linzess 72 MCG capsule capsule 72 mcg Every Morning Before Breakfast.     • metroNIDAZOLE (FLAGYL) 0.75 % lotion lotion      • pantoprazole (PROTONIX) 40 MG EC tablet Take 40 mg by mouth Daily.     • pravastatin (PRAVACHOL) 40 MG tablet Take 40 mg by mouth Daily.     • PreviDent 5000 Plus 1.1 % cream      • promethazine (PHENERGAN) 25 MG tablet As Needed.     • propranolol (INDERAL) 80 MG tablet Take 80 mg by mouth 3 (Three) Times a Day.     • propranolol XL (INNOPRAN XL) 80 MG 24 hr capsule Take 80 mg by mouth Daily.     • QUEtiapine (SEROquel) 200 MG tablet Take 200 mg by mouth.     • raNITIdine (ZANTAC) 150 MG tablet Take 150  mg by mouth.     • simvastatin (ZOCOR) 20 MG tablet 20 mg Every Night.     • solifenacin (VESICARE) 5 MG tablet Take 5 mg by mouth Daily.     • Synthroid 50 MCG tablet Take 50 mcg by mouth Daily.     • topiramate (TOPAMAX) 100 MG tablet 100 mg Daily.     • tretinoin (RETIN-A) 0.025 % cream      • Vagifem 10 MCG tablet vaginal tablet      • Viibryd 40 MG tablet tablet 40 mg Daily.     • Voltaren 1 % gel gel      • Xiidra 5 % ophthalmic solution        No current facility-administered medications for this visit.     Review of Systems   Constitutional: Negative.    Musculoskeletal:        Right foot surgery   Skin:        Postop right foot   All other systems reviewed and are negative.      OBJECTIVE     Vitals:    08/12/21 0901   BP: 95/64   Pulse: 69   Temp: 97.6 °F (36.4 °C)   SpO2: 100%       PHYSICAL EXAM  GEN:        Foot/Ankle Exam:       General:   Appearance: appears stated age and healthy    Orientation: AAOx3    Affect: appropriate    Assistance: wheelchair    Shoes: Surgical shoe on right foot.    VASCULAR      Right Foot Vascularity   Normal vascular exam    Dorsalis pedis:  2+  Posterior tibial:  2+  Skin Temperature: warm    Edema Grading:  None  CFT:  < 3 seconds  Pedal Hair Growth:  Present  Varicosities: none       Left Foot Vascularity   Normal vascular exam    Dorsalis pedis:  2+  Posterior tibial:  2+  Skin Temperature: warm    Edema Grading:  None  CFT:  < 3 seconds  Pedal Hair Growth:  Present  Varicosities: none        NEUROLOGIC     Right Foot Neurologic   Normal sensation    Light touch sensation:  Normal  Vibratory sensation:  Normal  Hot/Cold sensation: normal       Left Foot Neurologic   Normal sensation    Light touch sensation:  Normal  Vibratory sensation:  Normal  Hot/cold sensation: normal        Right Foot Additional Comments Skin edges well-coapted with no signs of dehiscence.  No hypertrophic scar formation.    No signs of edema, erythema, lymphangitis, nor signs of infection.       Right first second and third toes are normal corrected rectus position.      RADIOLOGY:    XR Foot 3+ View Right    Result Date: 8/12/2021  Narrative: IN-OFFICE IMAGING:  3 view, AP, MO, Lateral, right foot.  Postop imaging.  Radiographs show proximal 1st metatarsal shaft osteotomy which show no other changes seen in position of the distal fragment in the first metatarsal shaft compared to images taken on last visit.    Arthroplasties of the right second and third toes remain in original postop position.  Continued improvement and increase in trabeculation seen across osteotomy sites.  No osteolytic changes seen surrounding screw placement.  No periosteal reactions nor osteolytic lesions seen.  No occult fracture seen.  No other changes seen compared to previous views.       ASSESSMENT/PLAN     Diagnoses and all orders for this visit:    1. Foot pain, right (Primary)    2. Hallux valgus of right foot    3. Hammer toe of right foot      Comprehensive lower extremity examination and evaluation was performed.  Discussed findings and treatment plan including risks, benefits, and treatment options with patient in detail. Patient agreed with treatment plan.    Patient may begin to weight bear as tolerated in supportive shoes. No impact.  After that time, the patient may increase activities as tolerated. Patient states understanding and agreement with this plan.         An After Visit Summary was printed and given to the patient at discharge, including (if requested) any available informative/educational handouts regarding diagnosis, treatment, or medications. All questions were answered to patient/family satisfaction. Should symptoms fail to improve or worsen they agree to call or return to clinic or to go to the Emergency Department. Discussed the importance of following up with any needed screening tests/labs/specialist appointments and any requested follow-up recommended by me today. Importance of maintaining  follow-up discussed and patient accepts that missed appointments can delay diagnosis and potentially lead to worsening of conditions.    Return in about 3 weeks (around 9/2/2021) for Post Operative; x-ray, probalble discharge., or sooner if acute issues arise.        This document has been electronically signed by Lawrence Zamorano DPM on August 12, 2021 09:48 EDT

## 2021-09-01 ENCOUNTER — OFFICE VISIT (OUTPATIENT)
Dept: PODIATRY | Facility: CLINIC | Age: 61
End: 2021-09-01

## 2021-09-01 VITALS
BODY MASS INDEX: 23.14 KG/M2 | SYSTOLIC BLOOD PRESSURE: 89 MMHG | HEART RATE: 72 BPM | WEIGHT: 144 LBS | DIASTOLIC BLOOD PRESSURE: 60 MMHG | HEIGHT: 66 IN | OXYGEN SATURATION: 100 % | TEMPERATURE: 97.3 F

## 2021-09-01 DIAGNOSIS — M20.41 HAMMER TOE OF RIGHT FOOT: ICD-10-CM

## 2021-09-01 DIAGNOSIS — M79.671 FOOT PAIN, RIGHT: Primary | ICD-10-CM

## 2021-09-01 DIAGNOSIS — M20.11 HALLUX VALGUS OF RIGHT FOOT: ICD-10-CM

## 2021-09-01 PROCEDURE — 99024 POSTOP FOLLOW-UP VISIT: CPT | Performed by: PODIATRIST

## 2021-09-01 NOTE — PROGRESS NOTES
[facility] - PODIATRY    Today's Date: 09/01/21    Patient Name: Ann Perla  MRN: [id number]  [facility]: [id number]  PCP: Estee Figueroa APRN  Referring Provider: No ref. provider found    SUBJECTIVE     Chief Complaint   Patient presents with   • Right Foot - Post-op Follow-up, Bunions, Hammer Toe     HPI: Ann Perla, a 60 y.o.female, comes to clinic as a(n) est patient for post-op appt:    Procedure: Right base wedge bunionectomy and second third hammertoe corrections  Date: 04 June 2021    Patient states they are doing well without complications.  Patient states they are following post-op instructions.  Patient states pain is controlled.    Denies change in medical history since last visit. Denies any constitutional symptoms. No other pedal complaints at this time.    Patient states her foot is feeling better and looks better.    Patient denies any fevers, chills, nausea, vomiting, shortness of breathe, nor any other constitutional signs nor symptoms.      Past Medical History:   Diagnosis Date   • Abdominal pain, LLQ 12/04/2015   • Bunion    • Depressive disorder    • Foot pain, right 04/08/2021   • GERD (gastroesophageal reflux disease)    • Hallux valgus of right foot 12/03/2018   • Hammer toe    • Herpes genitalia    • HLD (hyperlipidemia)    • Migraine    • Mixed incontinence urge and stress    • OAB (overactive bladder) 01/24/2018   • Urinary urgency      Past Surgical History:   Procedure Laterality Date   • CERVICAL FUSION     • COLONOSCOPY  2018, 2016   • CYSTOSCOPY     • CYSTOSCOPY RETROGRADE PYELOGRAM     • ENDOSCOPY  2018   • FOOT SURGERY     • HAND SURGERY Left    • HAND SURGERY Right    • HIATAL HERNIA REPAIR  05/10/2019   • NECK SURGERY     • TUBAL ABDOMINAL LIGATION     • VEIN SURGERY       Family History   Problem Relation Age of Onset   • Stroke Mother    • Hypertension Mother    • Heart disease Brother    • Kidney nephrosis Brother    • Hypertension Maternal  Grandmother    • Diabetes Maternal Grandmother    • Cancer Maternal Grandfather         Unspecified   • Cancer Other         Unspecified   • Cancer Other         Unspecified   • Lung cancer Other      Social History     Socioeconomic History   • Marital status:      Spouse name: Not on file   • Number of children: Not on file   • Years of education: Not on file   • Highest education level: Not on file   Tobacco Use   • Smoking status: Never Smoker   • Smokeless tobacco: Never Used   Vaping Use   • Vaping Use: Never used   Substance and Sexual Activity   • Alcohol use: Not Currently     Comment: nemesio- 7-8-19   • Drug use: Never   • Sexual activity: Defer     No Known Allergies  Current Outpatient Medications   Medication Sig Dispense Refill   • buPROPion SR (WELLBUTRIN SR) 150 MG 12 hr tablet Take 150 mg by mouth.     • Calcium Carbonate-Vitamin D (calcium-vitamin D) 500-200 MG-UNIT tablet per tablet 500 mg Daily.     • cyclobenzaprine (FLEXERIL) 5 MG tablet 5 mg 2 (Two) Times a Day As Needed.     • dicyclomine (BENTYL) 10 MG capsule      • ergocalciferol (ERGOCALCIFEROL) 1.25 MG (74777 UT) capsule Take 50,000 Units by mouth.     • HYDROcodone-acetaminophen (NORCO) 5-325 MG per tablet Take 1 tablet by mouth Every 6 (Six) Hours As Needed for Other (pain). Take one to two tablets every 4-6 hours prn pain 30 tablet 0   • Linzess 72 MCG capsule capsule 72 mcg Every Morning Before Breakfast.     • pantoprazole (PROTONIX) 40 MG EC tablet Take 40 mg by mouth Daily.     • pravastatin (PRAVACHOL) 40 MG tablet Take 40 mg by mouth Daily.     • PreviDent 5000 Plus 1.1 % cream As Needed.     • promethazine (PHENERGAN) 25 MG tablet As Needed.     • propranolol (INDERAL) 80 MG tablet Take 80 mg by mouth 3 (Three) Times a Day.     • propranolol XL (INNOPRAN XL) 80 MG 24 hr capsule Take 80 mg by mouth Daily.     • QUEtiapine (SEROquel) 200 MG tablet Take 200 mg by mouth.     • raNITIdine (ZANTAC) 150 MG tablet Take 150 mg by  mouth.     • simvastatin (ZOCOR) 20 MG tablet 20 mg Every Night.     • solifenacin (VESICARE) 5 MG tablet Take 5 mg by mouth Daily.     • Synthroid 50 MCG tablet Take 50 mcg by mouth Daily.     • topiramate (TOPAMAX) 100 MG tablet 100 mg Daily.     • tretinoin (RETIN-A) 0.025 % cream Apply  topically to the appropriate area as directed Every Night.     • Vagifem 10 MCG tablet vaginal tablet Every Other Day.     • Viibryd 40 MG tablet tablet 40 mg Daily.     • Voltaren 1 % gel gel As Needed.     • Xiidra 5 % ophthalmic solution 1 (One) Time.       No current facility-administered medications for this visit.     Review of Systems   Constitutional: Negative.    Musculoskeletal:        Right foot surgery   Skin:        Postop right foot   All other systems reviewed and are negative.      OBJECTIVE     Vitals:    09/01/21 0731   BP: (!) 89/60   Pulse: 72   Temp: 97.3 °F (36.3 °C)   SpO2: 100%       PHYSICAL EXAM  GEN:        Foot/Ankle Exam:       General:   Appearance: appears stated age and healthy    Orientation: AAOx3    Affect: appropriate    Assistance: wheelchair    Shoes: Surgical shoe on right foot.    VASCULAR      Right Foot Vascularity   Normal vascular exam    Dorsalis pedis:  2+  Posterior tibial:  2+  Skin Temperature: warm    Edema Grading:  None  CFT:  < 3 seconds  Pedal Hair Growth:  Present  Varicosities: none       Left Foot Vascularity   Normal vascular exam    Dorsalis pedis:  2+  Posterior tibial:  2+  Skin Temperature: warm    Edema Grading:  None  CFT:  < 3 seconds  Pedal Hair Growth:  Present  Varicosities: none        NEUROLOGIC     Right Foot Neurologic   Normal sensation    Light touch sensation:  Normal  Vibratory sensation:  Normal  Hot/Cold sensation: normal       Left Foot Neurologic   Normal sensation    Light touch sensation:  Normal  Vibratory sensation:  Normal  Hot/cold sensation: normal        Right Foot Additional Comments Skin edges well-coapted with no signs of dehiscence.  No  hypertrophic scar formation.  Continues to heal well.    No signs of edema, erythema, lymphangitis, nor signs of infection.      Right first second and third toes are normal corrected rectus position.      RADIOLOGY:    XR Foot 3+ View Right    Result Date: 9/1/2021  Narrative: IN-OFFICE IMAGING:  3 view, AP, MO, Lateral, right foot.   Postop imaging.   Radiographs show proximal 1st metatarsal shaft osteotomy which show no other changes seen in position of the distal fragment in the first metatarsal shaft compared to images taken on last visit.    Arthroplasties of the right second and third toes remain in original postop position.  Continued improvement and increase in trabeculation seen across osteotomy sites.  Continue consolidation and improvement with healing across osteotomy sites.  No osteolytic changes seen surrounding screw placement.  No periosteal reactions nor osteolytic lesions seen.  No occult fracture seen.   No other changes seen compared to previous views.      ASSESSMENT/PLAN     Diagnoses and all orders for this visit:    1. Foot pain, right (Primary)    2. Hallux valgus of right foot    3. Hammer toe of right foot      Comprehensive lower extremity examination and evaluation was performed.    Discussed findings and treatment plan including risks, benefits, and treatment options with patient in detail. Patient agreed with treatment plan.    Patient may begin to weight bear as tolerated in supportive shoes. No impact.  After that time, the patient may increase activities as tolerated. Patient states understanding and agreement with this plan.         Patient is discharged from the surgery.  She is to monitor her for any concerns from the surgery or any other issues that may arise.  Patient states understanding agreement with this plan.    An After Visit Summary was printed and given to the patient at discharge, including (if requested) any available informative/educational handouts regarding  diagnosis, treatment, or medications. All questions were answered to patient/family satisfaction. Should symptoms fail to improve or worsen they agree to call or return to clinic or to go to the Emergency Department. Discussed the importance of following up with any needed screening tests/labs/specialist appointments and any requested follow-up recommended by me today. Importance of maintaining follow-up discussed and patient accepts that missed appointments can delay diagnosis and potentially lead to worsening of conditions.    Return if symptoms worsen or fail to improve., or sooner if acute issues arise.        This document has been electronically signed by Lawrence Zamorano DPM on September 1, 2021 07:45 EDT

## 2021-09-16 ENCOUNTER — APPOINTMENT (OUTPATIENT)
Dept: MAMMOGRAPHY | Facility: HOSPITAL | Age: 61
End: 2021-09-16

## 2021-10-07 ENCOUNTER — TRANSCRIBE ORDERS (OUTPATIENT)
Dept: GENERAL RADIOLOGY | Facility: HOSPITAL | Age: 61
End: 2021-10-07

## 2021-10-07 ENCOUNTER — HOSPITAL ENCOUNTER (OUTPATIENT)
Dept: GENERAL RADIOLOGY | Facility: HOSPITAL | Age: 61
Discharge: HOME | End: 2021-10-07
Admitting: PHYSICIAN ASSISTANT

## 2021-10-07 DIAGNOSIS — M51.36 DEGENERATION OF LUMBAR INTERVERTEBRAL DISC: Primary | ICD-10-CM

## 2021-10-07 DIAGNOSIS — M51.36 DEGENERATION OF LUMBAR INTERVERTEBRAL DISC: ICD-10-CM

## 2021-10-07 PROCEDURE — 72110 X-RAY EXAM L-2 SPINE 4/>VWS: CPT

## 2021-10-11 ENCOUNTER — TRANSCRIBE ORDERS (OUTPATIENT)
Dept: PHYSICAL THERAPY | Facility: CLINIC | Age: 61
End: 2021-10-11

## 2021-10-11 DIAGNOSIS — M51.36 DEGENERATION OF LUMBAR INTERVERTEBRAL DISC: Primary | ICD-10-CM

## 2021-10-15 ENCOUNTER — TREATMENT (OUTPATIENT)
Dept: PHYSICAL THERAPY | Facility: CLINIC | Age: 61
End: 2021-10-15

## 2021-10-15 DIAGNOSIS — R29.3 POOR POSTURE: ICD-10-CM

## 2021-10-15 DIAGNOSIS — G89.29 CHRONIC BILATERAL LOW BACK PAIN, UNSPECIFIED WHETHER SCIATICA PRESENT: Primary | ICD-10-CM

## 2021-10-15 DIAGNOSIS — M25.559 PAIN, HIP: ICD-10-CM

## 2021-10-15 DIAGNOSIS — M54.50 CHRONIC BILATERAL LOW BACK PAIN, UNSPECIFIED WHETHER SCIATICA PRESENT: Primary | ICD-10-CM

## 2021-10-15 PROCEDURE — 97162 PT EVAL MOD COMPLEX 30 MIN: CPT | Performed by: PHYSICAL THERAPIST

## 2021-10-15 NOTE — PROGRESS NOTES
Physical Therapy Initial Evaluation and Plan of Care      Patient: Ann Perla   : 1960  Diagnosis/ICD-10 Code:  Chronic bilateral low back pain, unspecified whether sciatica present [M54.50, G89.29]  Referring practitioner: JEFFREY Vuong  Date of Initial Visit: 10/15/2021  Today's Date: 10/15/2021  Patient seen for 1 sessions    Progress note due: 2021  Re-cert due: 2022           Subjective Questionnaire: Oswestry:       Subjective Evaluation    History of Present Illness    Subjective comment: Pt states she has had back pain for years. She has tried injections in her back but they did not help. 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 Pt states the pain is worse when gettting up from a chair. She is gets occasional pain down both hips and thighs.  Pt states she fell over the summer and broke her left rib. It occasionally bothers her. Pain  Current pain ratin  Progression: worsening    Patient Goals  Patient goals for therapy: decreased pain             Objective          Static Posture     Head  Forward.    Shoulders  Rounded.    Thoracic Spine  Hyperkyphosis.    Lumbar Spine   Increased lordosis.     Palpation   Left   Tenderness of the erector spinae.     Right Tenderness of the erector spinae.     Active Range of Motion     Additional Active Range of Motion Details  50% of WFL AROM in all directions     Strength/Myotome Testing     Left Hip   Planes of Motion   Flexion: 4-  Abduction: 4-  Adduction: 4-    Right Hip   Planes of Motion   Flexion: 4-  Abduction: 4-  Adduction: 4-    Left Knee   Flexion: 4  Extension: 4    Right Knee   Flexion: 4  Extension: 4    Left Ankle/Foot   Dorsiflexion: 4  Plantar flexion: 4    Right Ankle/Foot   Dorsiflexion: 4  Plantar flexion: 4              Assessment & Plan     Assessment  Impairments: abnormal muscle firing, abnormal or restricted ROM, activity intolerance, impaired physical  strength, lacks appropriate home exercise program and pain with function  Assessment details: Pt presents with low back and bilateral hip pain with bilateral hip and knee weakness. Pt will benefit from skilled PT to address functional deficits and return to PLOF.       Prognosis: good  Functional Limitations: carrying objects, lifting, sleeping, walking, pulling, pushing, uncomfortable because of pain, moving in bed, sitting, standing and stooping  Goals  Plan Goals: 1. The patient has complaints of pain.   LTG 1: 6 weeks:  The patient will report lower back pain no greater than 2 in order to improve tolerance with activities of daily living and improve sleep quality.    STATUS:  New   STG 1a: 3 weeks:  The patient will report lower back pain no greater than 4.     STATUS:  New   TREATMENT:  Manual therapy, therapeutic exercise, home exercise instruction, aquatic therapy, pelvic traction, and modalities as needed to include: electrical stimulation, ultrasound, moist heat, and ice.     2. Decreased B hip weakness    LTG 2: 6 weeks: Pt will improve bilateral hip flexor and abduction strength to 4+/5 to improve BLE function.     STATUS:  New     STG 2a: 3 weeks:  Pt will improve bilateral hip flexor and abduction strength to 4/5 to improve BLE function    STATUS:  New   TREATMENT: Manual therapy, therapeutic exercise, home exercise instruction, aquatic therapy, pelvic traction, and modalities as needed to include: electrical stimulation, ultrasound, moist heat, and ice.        3. Mobility: Walking/Moving Around Functional Limitation     LTG 3: 6 weeks:  The patient will demonstrate  VIRGINIE score to 5 to decrease limitation.     STATUS:  New   STG 3a: 3 weeks:  The patient will demonstrate  VIRGINIE score to 10 to decrease limitation.    STATUS:  New   TREATMENT:  Manual therapy, therapeutic exercise, home exercise instruction, and modalities as needed to include: moist heat, electrical stimulation, and  ultrasound.      Plan  Therapy options: will be seen for skilled physical therapy services  Planned modality interventions: cryotherapy, TENS and thermotherapy (hydrocollator packs)  Planned therapy interventions: abdominal trunk stabilization, manual therapy, neuromuscular re-education, soft tissue mobilization, spinal/joint mobilization, strengthening, stretching, therapeutic activities, functional ROM exercises, flexibility and body mechanics training  Frequency: 2x week  Duration in weeks: 8  Treatment plan discussed with: patient        Visit Diagnoses:    ICD-10-CM ICD-9-CM   1. Chronic bilateral low back pain, unspecified whether sciatica present  M54.50 724.2    G89.29 338.29   2. Pain, hip  M25.559 719.45   3. Poor posture  R29.3 781.92       Timed:         Manual Therapy:  0     mins  24840;     Therapeutic Exercise:    0     mins  86885;     Neuromuscular Vicente:    0    mins  15470;    Therapeutic Activity:     0     mins  88229;     Gait Trainin     mins  91152;     Ultrasound:     0     mins  23072;    Ionto                               0    mins   71690  Self-care  __0__ mins 15615    Un-Timed:  Electrical Stimulation:    0     mins  79279 ( );  Traction     0     mins 81603  Low Eval     0     Mins  86928  Mod Eval     30     Mins  96999  High Eval                       0     Mins  40507  Hot pack     0     Mins    Cold pack                       0     Mins      Timed Treatment:   0   mins   Total Treatment:     30   mins    PT SIGNATURE: Joi Buck PT, DPT        Initial Certification  Certification Period: 10/15/2021 thru 2022  I certify that the therapy services are furnished while this patient is under my care.  The services outlined above are required by this patient, and will be reviewed every 90 days.     PHYSICIAN: Eneida Adorno PA-C      DATE:     Please sign and return via fax to [phone].. Thank you, [facility].

## 2021-10-18 ENCOUNTER — TREATMENT (OUTPATIENT)
Dept: PHYSICAL THERAPY | Facility: CLINIC | Age: 61
End: 2021-10-18

## 2021-10-18 DIAGNOSIS — G89.29 CHRONIC BILATERAL LOW BACK PAIN, UNSPECIFIED WHETHER SCIATICA PRESENT: Primary | ICD-10-CM

## 2021-10-18 DIAGNOSIS — M25.559 PAIN, HIP: ICD-10-CM

## 2021-10-18 DIAGNOSIS — R29.3 POOR POSTURE: ICD-10-CM

## 2021-10-18 DIAGNOSIS — M54.50 CHRONIC BILATERAL LOW BACK PAIN, UNSPECIFIED WHETHER SCIATICA PRESENT: Primary | ICD-10-CM

## 2021-10-18 PROCEDURE — 97140 MANUAL THERAPY 1/> REGIONS: CPT | Performed by: PHYSICAL THERAPIST

## 2021-10-18 PROCEDURE — 97110 THERAPEUTIC EXERCISES: CPT | Performed by: PHYSICAL THERAPIST

## 2021-10-18 NOTE — PROGRESS NOTES
Physical Therapy Daily Progress Note        Patient: Ann Perla   : 1960  Diagnosis/ICD-10 Code:  Chronic bilateral low back pain, unspecified whether sciatica present [M54.50, G89.29]  Referring practitioner: PREETI Vuong*  Date of Initial Visit: Type: THERAPY  Noted: 10/15/2021  Today's Date: 10/18/2021  Patient seen for 2 sessions             Subjective   Ann Perla reports: still having quite a bit of low back pain. States that the back really hurts first thing in the morning or if she sits for a longer period of time, when she stands up her back is super stiff.     Currently: 6-7/10 pain, didn't take anything for pain management.    Objective   Minimal discomfort with bridges    See Exercise, Manual, and Modality Logs for complete treatment.       Assessment/Plan  Ann still experiencing increased low back  pain, especially first thing in the morning and at night. Pt had some increased discomfort with with bridges. Pt would benefit from skilled PT to address Range of Motion  and Strength deficits, pain management and any concerns with ADLs.         Progress per Plan of Care           Timed:  Manual Therapy:   20      mins  91176;  Therapeutic Exercise:    10     mins  99759;     Neuromuscular Vicente:        mins  95890;    Therapeutic Activity:          mins  65473;     Gait Training:           mins  95020;    Aquatic Therapy:          mins  32207;       Untimed:  Electrical Stimulation:         mins  48920 ( );  Mechanical Traction:         mins  62079;       Timed Treatment:   30   mins   Total Treatment:     30   mins        Sharmila Iniguez PTA  Physical Therapist Assistant

## 2021-10-21 ENCOUNTER — TREATMENT (OUTPATIENT)
Dept: PHYSICAL THERAPY | Facility: CLINIC | Age: 61
End: 2021-10-21

## 2021-10-21 DIAGNOSIS — R29.3 POOR POSTURE: ICD-10-CM

## 2021-10-21 DIAGNOSIS — M25.559 PAIN, HIP: ICD-10-CM

## 2021-10-21 DIAGNOSIS — G89.29 CHRONIC BILATERAL LOW BACK PAIN, UNSPECIFIED WHETHER SCIATICA PRESENT: Primary | ICD-10-CM

## 2021-10-21 DIAGNOSIS — M54.50 CHRONIC BILATERAL LOW BACK PAIN, UNSPECIFIED WHETHER SCIATICA PRESENT: Primary | ICD-10-CM

## 2021-10-21 PROCEDURE — 97140 MANUAL THERAPY 1/> REGIONS: CPT | Performed by: PHYSICAL THERAPIST

## 2021-10-21 PROCEDURE — 97110 THERAPEUTIC EXERCISES: CPT | Performed by: PHYSICAL THERAPIST

## 2021-10-21 NOTE — PROGRESS NOTES
Physical Therapy Daily Progress Note        Patient: Ann Perla   : 1960  Diagnosis/ICD-10 Code:  Chronic bilateral low back pain, unspecified whether sciatica present [M54.50, G89.29]  Referring practitioner: JEFFREY Vuong  Date of Initial Visit: Type: THERAPY  Noted: 10/15/2021  Today's Date: 10/21/2021  Patient seen for 3 sessions             Subjective   Ann Perla reports: back feeling okay, states that she wasn't sore following last PT session.     Objective   No complaints of increased pain or discomfort.     See Exercise, Manual, and Modality Logs for complete treatment.       Assessment/Plan  Ann still experiencing increased low back  pain. Pt tolerated exercises and manual well, no complaints of increased pain or discomfort. Pt would benefit from skilled PT to address Range of Motion  and Strength deficits, pain management and any concerns with ADLs.     Progress per Plan of Care           Timed:  Manual Therapy:    10     mins  09746;  Therapeutic Exercise:    20     mins  94280;     Neuromuscular Vicente:        mins  97115;    Therapeutic Activity:          mins  74748;     Gait Training:           mins  96093;    Aquatic Therapy:          mins  83111;       Untimed:  Electrical Stimulation:         mins  12773 ( );  Mechanical Traction:         mins  82943;       Timed Treatment:   30   mins   Total Treatment:     30   mins      Electronically signed:   Sharmila Iniguez PTA  Physical Therapist Assistant  [place] [employer] License #: [id number]

## 2021-10-26 ENCOUNTER — TREATMENT (OUTPATIENT)
Dept: PHYSICAL THERAPY | Facility: CLINIC | Age: 61
End: 2021-10-26

## 2021-10-26 DIAGNOSIS — G89.29 CHRONIC BILATERAL LOW BACK PAIN, UNSPECIFIED WHETHER SCIATICA PRESENT: Primary | ICD-10-CM

## 2021-10-26 DIAGNOSIS — R29.3 POOR POSTURE: ICD-10-CM

## 2021-10-26 DIAGNOSIS — M54.50 CHRONIC BILATERAL LOW BACK PAIN, UNSPECIFIED WHETHER SCIATICA PRESENT: Primary | ICD-10-CM

## 2021-10-26 DIAGNOSIS — M25.559 PAIN, HIP: ICD-10-CM

## 2021-10-26 PROCEDURE — 97110 THERAPEUTIC EXERCISES: CPT | Performed by: PHYSICAL THERAPIST

## 2021-10-26 NOTE — PROGRESS NOTES
Physical Therapy Daily Treatment Note      Patient: Ann Perla   : 1960  Referring practitioner: PREETI Vuong*  Date of Initial Visit: Type: THERAPY  Noted: 10/15/2021  Today's Date: 10/26/2021  Patient seen for 4 sessions         Ann Perla reports: back pain is 5/10 today. No improvements noted since last visit. Her symptoms are worse with sitting too long and activity.       Subjective     Objective   See Exercise, Manual, and Modality Logs for complete treatment.       Assessment & Plan     Assessment  Assessment details: Tolerated session but required max cues for pelvic tilts and demonstrates increased weakness of L hip compared to R. No reported of increased back pain. Progress next visit as tolerated.         Visit Diagnoses:    ICD-10-CM ICD-9-CM   1. Chronic bilateral low back pain, unspecified whether sciatica present  M54.50 724.2    G89.29 338.29   2. Pain, hip  M25.559 719.45   3. Poor posture  R29.3 781.92       Progress per Plan of Care           Timed:         Manual Therapy:    0     mins  71796;     Therapeutic Exercise:    30     mins  34713;     Neuromuscular Vicente:    0    mins  64965;    Therapeutic Activity:     0     mins  10459;     Gait Trainin     mins  14178;     Ultrasound:     0     mins  39053;    Ionto                               0    mins   93687  Self-care  __0__ mins 91026    Un-Timed:  Electrical Stimulation:    00     mins  71927 ( );  Traction     0     mins 16651  Hot pack     0     Mins    Cold pack                       0     Mins      Timed Treatment:   30   mins   Total Treatment:     30   mins    Joi Buck PT, DPT  Physical Therapist

## 2021-10-29 ENCOUNTER — TREATMENT (OUTPATIENT)
Dept: PHYSICAL THERAPY | Facility: CLINIC | Age: 61
End: 2021-10-29

## 2021-10-29 DIAGNOSIS — M25.559 PAIN, HIP: Primary | ICD-10-CM

## 2021-10-29 DIAGNOSIS — R29.3 POOR POSTURE: ICD-10-CM

## 2021-10-29 DIAGNOSIS — M54.50 CHRONIC BILATERAL LOW BACK PAIN, UNSPECIFIED WHETHER SCIATICA PRESENT: ICD-10-CM

## 2021-10-29 DIAGNOSIS — G89.29 CHRONIC BILATERAL LOW BACK PAIN, UNSPECIFIED WHETHER SCIATICA PRESENT: ICD-10-CM

## 2021-10-29 PROCEDURE — 97110 THERAPEUTIC EXERCISES: CPT | Performed by: PHYSICAL THERAPIST

## 2021-10-29 PROCEDURE — 97530 THERAPEUTIC ACTIVITIES: CPT | Performed by: PHYSICAL THERAPIST

## 2021-10-29 NOTE — PROGRESS NOTES
Physical Therapy Daily Treatment Note      Patient: Ann Perla   : 1960  Referring practitioner: PREETI Vuong*  Date of Initial Visit: Type: THERAPY  Noted: 10/15/2021  Today's Date: 10/29/2021  Patient seen for 5 sessions         Ann Perla reports: pain is 6/10 in her low back when she gets up from a chair. Walking helps work out the pain.       Subjective     Objective   See Exercise, Manual, and Modality Logs for complete treatment.       Assessment & Plan     Assessment  Assessment details: Pt tolerated session and demonstrates increased independence with PPT and was able to progress core and hip strengthening exercises. Progress next visit as tolerated.         Visit Diagnoses:    ICD-10-CM ICD-9-CM   1. Pain, hip  M25.559 719.45   2. Chronic bilateral low back pain, unspecified whether sciatica present  M54.50 724.2    G89.29 338.29   3. Poor posture  R29.3 781.92       Progress per Plan of Care           Timed:         Manual Therapy:    0     mins  54068;     Therapeutic Exercise:    20     mins  47191;     Neuromuscular Vicente:    0    mins  90550;    Therapeutic Activity:     10     mins  43595;     Gait Trainin     mins  75321;     Ultrasound:     0     mins  47815;    Ionto                               0    mins   66138  Self-care  __00__ mins 17258    Un-Timed:  Electrical Stimulation:    0     mins  61832 ( );  Traction     0     mins 04647  Hot pack     0     Mins    Cold pack                       0     Mins      Timed Treatment:   30   mins   Total Treatment:     30   mins    Joi Buck PT, DPT  Physical Therapist

## 2021-11-03 ENCOUNTER — TREATMENT (OUTPATIENT)
Dept: PHYSICAL THERAPY | Facility: CLINIC | Age: 61
End: 2021-11-03

## 2021-11-03 DIAGNOSIS — M25.559 PAIN, HIP: Primary | ICD-10-CM

## 2021-11-03 DIAGNOSIS — M54.50 CHRONIC BILATERAL LOW BACK PAIN, UNSPECIFIED WHETHER SCIATICA PRESENT: ICD-10-CM

## 2021-11-03 DIAGNOSIS — G89.29 CHRONIC BILATERAL LOW BACK PAIN, UNSPECIFIED WHETHER SCIATICA PRESENT: ICD-10-CM

## 2021-11-03 DIAGNOSIS — R29.3 POOR POSTURE: ICD-10-CM

## 2021-11-03 PROCEDURE — 97530 THERAPEUTIC ACTIVITIES: CPT | Performed by: PHYSICAL THERAPIST

## 2021-11-03 PROCEDURE — 97110 THERAPEUTIC EXERCISES: CPT | Performed by: PHYSICAL THERAPIST

## 2021-11-03 PROCEDURE — 97140 MANUAL THERAPY 1/> REGIONS: CPT | Performed by: PHYSICAL THERAPIST

## 2021-11-03 NOTE — PROGRESS NOTES
Physical Therapy Daily Progress Note        Patient: Ann Perla   : 1960  Diagnosis/ICD-10 Code:  Pain, hip [M25.559]  Referring practitioner: JEFFREY Vuong  Date of Initial Visit: Type: THERAPY  Noted: 10/15/2021  Today's Date: 11/3/2021  Patient seen for 6 sessions             Subjective   Ann Perla rates her lower back pain at 5/10 upon arrival today. She reports having had follow-up with Neurosurgeon yesterday.  She states they are going to schedule her an MRI of her back and plan to set her up with series of 3 injections.    Objective     Bilateral Hip abductor Strength:  Grossly 4-/5    Decreased functional Quad Strength as noted with sit to stands and Step ups.    See Exercise and Manual Logs for complete treatment.       Assessment/Plan    Pt tolerated therapy session well - with progression of therapeutic exercises, CKC-Functional activities, and Manual therapy. She has improved, but continues to have deficits in Trunk and Bilateral Lower Extremity ROM,  Strength, and Stability; limiting function and ability to perform ADLs at this time.  Blue band issued for HEP progression today.    Progress per Plan of Care           Timed:8  Manual Therapy:    8     mins  72835;  Therapeutic Exercise:    20     mins  62449;     Neuromuscular Vicente:    0    mins  07933;    Therapeutic Activity:     10     mins  61766;     Gait Trainin     mins  14071;     Ultrasound:     0     mins  25253;    Electrical Stimulation:    0     mins  90930;  Iontophoresis     0     mins  45367    Untimed:  Electrical Stimulation:    0     mins  82688 ( );  Mechanical Traction:    0     mins  09787;   Fluidotherapy     0     mins  43290  Hot pack     0     mins  70071  Cold pack     0     mins  68225    Timed Treatment:   40   mins   Total Treatment:     40   mins        Mariza Temple PTA  Physical Therapist Assistant

## 2021-11-04 ENCOUNTER — TRANSCRIBE ORDERS (OUTPATIENT)
Dept: ADMINISTRATIVE | Facility: HOSPITAL | Age: 61
End: 2021-11-04

## 2021-11-04 DIAGNOSIS — M51.36 DISC DEGENERATION, LUMBAR: Primary | ICD-10-CM

## 2021-11-05 ENCOUNTER — TREATMENT (OUTPATIENT)
Dept: PHYSICAL THERAPY | Facility: CLINIC | Age: 61
End: 2021-11-05

## 2021-11-05 DIAGNOSIS — R29.3 POOR POSTURE: ICD-10-CM

## 2021-11-05 DIAGNOSIS — M54.50 CHRONIC BILATERAL LOW BACK PAIN, UNSPECIFIED WHETHER SCIATICA PRESENT: ICD-10-CM

## 2021-11-05 DIAGNOSIS — M25.559 PAIN, HIP: Primary | ICD-10-CM

## 2021-11-05 DIAGNOSIS — G89.29 CHRONIC BILATERAL LOW BACK PAIN, UNSPECIFIED WHETHER SCIATICA PRESENT: ICD-10-CM

## 2021-11-05 PROCEDURE — 97530 THERAPEUTIC ACTIVITIES: CPT | Performed by: PHYSICAL THERAPIST

## 2021-11-05 PROCEDURE — 97110 THERAPEUTIC EXERCISES: CPT | Performed by: PHYSICAL THERAPIST

## 2021-11-05 NOTE — PROGRESS NOTES
Physical Therapy Daily Treatment Note      Patient: Ann Perla   : 1960  Referring practitioner: PREETI Vuong*  Date of Initial Visit: Type: THERAPY  Noted: 10/15/2021  Today's Date: 2021  Patient seen for 7 sessions         Ann Perla reports: she is experiencing overall body soreness from last visit but she also just got her 3rd covid shot. She is getting an MRI on her back and may be getting injections as well for pain. She is doing her HEP and is going to starting using her stationary bike at home.      Subjective     Objective   See Exercise, Manual, and Modality Logs for complete treatment.       Assessment & Plan     Assessment  Assessment details: Pt tolerated session today with no reports of pain. Pt continues to have pain sitting for prolonged periods at home or in the car. Pt to have MRI and follow up with referring provider. Pt will continue with HEP at home. Hold PT until follow up with referring provider.         Visit Diagnoses:    ICD-10-CM ICD-9-CM   1. Pain, hip  M25.559 719.45   2. Chronic bilateral low back pain, unspecified whether sciatica present  M54.50 724.2    G89.29 338.29   3. Poor posture  R29.3 781.92       Other Hold PT until MD  follow up  After MRI.            Timed:         Manual Therapy:    0     mins  10079;     Therapeutic Exercise:    15    mins  97208;     Neuromuscular Vicente:    0    mins  81253;    Therapeutic Activity:     15     mins  12705;     Gait Trainin     mins  36346;     Ultrasound:     0     mins  57721;    Ionto                               0    mins   82724  Self-care  __00__ mins 66754    Un-Timed:  Electrical Stimulation:    0     mins  70610 ( );  Traction     0     mins 81405  Hot pack     0     Mins    Cold pack                       0     Mins      Timed Treatment:   30   mins   Total Treatment:     30   mins    Joi Buck PT, DPT  Physical Therapist

## 2021-11-15 ENCOUNTER — HOSPITAL ENCOUNTER (OUTPATIENT)
Dept: MAMMOGRAPHY | Facility: HOSPITAL | Age: 61
Discharge: HOME | End: 2021-11-15
Admitting: NURSE PRACTITIONER

## 2021-11-15 DIAGNOSIS — Z12.31 SCREENING MAMMOGRAM, ENCOUNTER FOR: ICD-10-CM

## 2021-11-15 PROCEDURE — 77063 BREAST TOMOSYNTHESIS BI: CPT

## 2021-11-15 PROCEDURE — 77067 SCR MAMMO BI INCL CAD: CPT

## 2021-11-17 ENCOUNTER — HOSPITAL ENCOUNTER (OUTPATIENT)
Dept: MRI IMAGING | Facility: HOSPITAL | Age: 61
Discharge: HOME | End: 2021-11-17
Admitting: PHYSICIAN ASSISTANT

## 2021-11-17 DIAGNOSIS — M51.36 DISC DEGENERATION, LUMBAR: ICD-10-CM

## 2021-11-17 PROCEDURE — 72148 MRI LUMBAR SPINE W/O DYE: CPT

## 2021-11-18 ENCOUNTER — TREATMENT (OUTPATIENT)
Dept: PHYSICAL THERAPY | Facility: CLINIC | Age: 61
End: 2021-11-18

## 2021-11-18 DIAGNOSIS — R29.3 POOR POSTURE: ICD-10-CM

## 2021-11-18 DIAGNOSIS — M25.559 PAIN, HIP: Primary | ICD-10-CM

## 2021-11-18 DIAGNOSIS — M54.50 CHRONIC BILATERAL LOW BACK PAIN, UNSPECIFIED WHETHER SCIATICA PRESENT: ICD-10-CM

## 2021-11-18 DIAGNOSIS — G89.29 CHRONIC BILATERAL LOW BACK PAIN, UNSPECIFIED WHETHER SCIATICA PRESENT: ICD-10-CM

## 2021-11-18 PROCEDURE — 97140 MANUAL THERAPY 1/> REGIONS: CPT | Performed by: PHYSICAL THERAPIST

## 2021-11-18 PROCEDURE — 97530 THERAPEUTIC ACTIVITIES: CPT | Performed by: PHYSICAL THERAPIST

## 2021-11-18 PROCEDURE — 97110 THERAPEUTIC EXERCISES: CPT | Performed by: PHYSICAL THERAPIST

## 2021-11-18 NOTE — PROGRESS NOTES
Progress note    Patient: Ann Perla   : 1960  Diagnosis/ICD-10 Code:  Pain, hip [M25.559]  Referring practitioner: JEFFREY Vuong  Date of Initial Visit: Type: THERAPY  Noted: 10/15/2021  Today's Date: 2021  Patient seen for 8 sessions    Progress note due: 2021  Re-cert due: 2022      Subjective:   Ann Perla reports: mild lower back pain at beginning of session today.  Patient reports that she continues to have back pain and bilateral LE radicular pain during ADLs that go down to her feet. Pt reports that prolonged sitting increases her pain the most but she is limited in all ADLs including prolonged standing, walking and lifting due to pain.  Subjective Questionnaire: Oswestry:   Clinical Progress: unchanged  Home Program Compliance: Yes  Treatment has included: therapeutic exercise and manual therapy    Subjective   Objective          Active Range of Motion     Additional Active Range of Motion Details  Lumbar AROM:  Flexion: 25% limited (pain  Extension: WFL pain  Rotation: 25% limited bilaterally      Strength/Myotome Testing     Left Hip   Planes of Motion   Flexion: 4  Extension: 4-  Abduction: 4-    Right Hip   Planes of Motion   Flexion: 4  Extension: 4-  Abduction: 4-    Left Knee   Flexion: 4  Extension: 4    Right Knee   Flexion: 4  Extension: 4    Left Ankle/Foot   Dorsiflexion: 4+    Right Ankle/Foot   Dorsiflexion: 4+      Assessment & Plan     Assessment  Impairments: abnormal muscle firing, abnormal or restricted ROM, activity intolerance, impaired physical strength, lacks appropriate home exercise program and pain with function  Assessment details: Pt demonstrates mild improvements in bilateral hip flexion strength but continues to demonstrate significant bilateral hip and core weakness and generalized deconditioning as well as reports of pain limiting function as shown on the VIRGINIE.  Pt would continue to benefit from skilled PT services in  order to address remaining deficits limiting function at this time.        Prognosis: good  Functional Limitations: carrying objects, lifting, sleeping, walking, pulling, pushing, uncomfortable because of pain, moving in bed, sitting, standing and stooping  Goals  Plan Goals: 1. The patient has complaints of pain.   LTG 1: 6 weeks:  The patient will report lower back pain no greater than 2 in order to improve tolerance with activities of daily living and improve sleep quality.    STATUS:  Ongoing   STG 1a: 3 weeks:  The patient will report lower back pain no greater than 4.     STATUS:  Not met, continue   TREATMENT:  Manual therapy, therapeutic exercise, home exercise instruction, aquatic therapy, pelvic traction, and modalities as needed to include: electrical stimulation, ultrasound, moist heat, and ice.     2. Decreased B hip weakness    LTG 2: 6 weeks: Pt will improve bilateral hip flexor and abduction strength to 4+/5 to improve BLE function.     STATUS:  ongoing   STG 2a: 3 weeks:  Pt will improve bilateral hip flexor and abduction strength to 4/5 to improve BLE function    STATUS:  Partially met, continue   TREATMENT: Manual therapy, therapeutic exercise, home exercise instruction, aquatic therapy, pelvic traction, and modalities as needed to include: electrical stimulation, ultrasound, moist heat, and ice.        3. Mobility: Walking/Moving Around Functional Limitation     LTG 3: 6 weeks:  The patient will demonstrate  VIRGINIE score to 5 to decrease limitation.     STATUS:  Ongoing   STG 3a: 3 weeks:  The patient will demonstrate  VIRGINIE score to 10 to decrease limitation.    STATUS:  Not met, continue   TREATMENT:  Manual therapy, therapeutic exercise, home exercise instruction, and modalities as needed to include: moist heat, electrical stimulation, and ultrasound.      Plan  Therapy options: will be seen for skilled physical therapy services  Planned modality interventions: cryotherapy, TENS and thermotherapy  (hydrocollator packs)  Planned therapy interventions: abdominal trunk stabilization, manual therapy, neuromuscular re-education, soft tissue mobilization, spinal/joint mobilization, strengthening, stretching, therapeutic activities, functional ROM exercises, flexibility and body mechanics training  Frequency: 2x week  Duration in weeks: 4  Treatment plan discussed with: patient      Progress toward previous goals: Progressing        Recommendations: Continue as planned  Timeframe: 1 month  Prognosis to achieve goals: good    PT SIGNATURE: Connie Joy, PT     Electronically signed 2021  DATE TREATMENT INITIATED: 2021  [place] License: [id number]  NPI number: [id number]     Certification Period: 2021 thru 2/15/2022    Based upon review of the patient's progress and continued therapy plan, it is my medical opinion that Ann Perla should continue physical therapy treatment at [facility]  [address] [place] [id number]  [phone].    Signature: __________________________________  Eneida Adorno PA-C    Timed:  Manual Therapy:    10     mins  95069;  Therapeutic Exercise:    18     mins  91268;     Neuromuscular Vicente:    0    mins  67054;    Therapeutic Activity:     10     mins  45336;     Gait Trainin     mins  15402;     Ultrasound:     0     mins  51775;    Electrical Stimulation:    0     mins  83397 ( );    Untimed:  Electrical Stimulation:    0     mins  99907 ( );  Mechanical Traction:    0     mins  86914;     Timed Treatment:   38   mins   Total Treatment:     45   mins

## 2021-11-23 ENCOUNTER — TREATMENT (OUTPATIENT)
Dept: PHYSICAL THERAPY | Facility: CLINIC | Age: 61
End: 2021-11-23

## 2021-11-23 DIAGNOSIS — G89.29 CHRONIC BILATERAL LOW BACK PAIN, UNSPECIFIED WHETHER SCIATICA PRESENT: ICD-10-CM

## 2021-11-23 DIAGNOSIS — M25.559 PAIN, HIP: Primary | ICD-10-CM

## 2021-11-23 DIAGNOSIS — R29.3 POOR POSTURE: ICD-10-CM

## 2021-11-23 DIAGNOSIS — M54.50 CHRONIC BILATERAL LOW BACK PAIN, UNSPECIFIED WHETHER SCIATICA PRESENT: ICD-10-CM

## 2021-11-23 PROCEDURE — 97110 THERAPEUTIC EXERCISES: CPT | Performed by: PHYSICAL THERAPIST

## 2021-11-23 PROCEDURE — 97140 MANUAL THERAPY 1/> REGIONS: CPT | Performed by: PHYSICAL THERAPIST

## 2021-11-23 PROCEDURE — 97530 THERAPEUTIC ACTIVITIES: CPT | Performed by: PHYSICAL THERAPIST

## 2021-11-23 NOTE — PROGRESS NOTES
" Physical Therapy Daily Progress Note        Patient: Ann Perla   : 1960  Diagnosis/ICD-10 Code:  Pain, hip [M25.559]  Referring practitioner: JEFFREY Vuong  Date of Initial Visit: Type: THERAPY  Noted: 10/15/2021  Today's Date: 2021  Patient seen for 9 sessions             Subjective   Ann Perla reports: pain is \"about the same\".    Objective   Bilateral scapular stabilizer MMT: 4-/5  See Exercise, Manual, and Modality Logs for complete treatment.       Assessment/Plan  Patient tolerated all exercise progressions and manual therapy well today but continues to demonstrate strength/ROM deficits limiting function. Continue to progress per patient tolerance.    Progress per Plan of Care           Timed:  Manual Therapy:    8     mins  55955;  Therapeutic Exercise:    20     mins  45063;     Neuromuscular Vicente:    0    mins  17064;    Therapeutic Activity:     10     mins  95688;     Gait Trainin     mins  48653;     Ultrasound:     0     mins  94698;    Electrical Stimulation:    0     mins  52335;  Iontophoresis     0     mins  14759    Untimed:  Electrical Stimulation:    0     mins  09329 ( );  Mechanical Traction:    0     mins  01584;   Fluidotherapy     0     mins  05691  Hot pack     0     Mins    Cold pack                       0     Mins     Timed Treatment:   38   mins   Total Treatment:     38   mins        Connie Joy PT    Electronically signed [unfilled]  [place] License: [id number]  NPI number: [id number]  "

## 2021-12-08 ENCOUNTER — TREATMENT (OUTPATIENT)
Dept: PHYSICAL THERAPY | Facility: CLINIC | Age: 61
End: 2021-12-08

## 2021-12-08 DIAGNOSIS — R29.3 POOR POSTURE: ICD-10-CM

## 2021-12-08 DIAGNOSIS — G89.29 CHRONIC BILATERAL LOW BACK PAIN, UNSPECIFIED WHETHER SCIATICA PRESENT: ICD-10-CM

## 2021-12-08 DIAGNOSIS — M25.559 PAIN, HIP: Primary | ICD-10-CM

## 2021-12-08 DIAGNOSIS — M54.50 CHRONIC BILATERAL LOW BACK PAIN, UNSPECIFIED WHETHER SCIATICA PRESENT: ICD-10-CM

## 2021-12-08 PROCEDURE — 97110 THERAPEUTIC EXERCISES: CPT | Performed by: PHYSICAL THERAPIST

## 2021-12-08 PROCEDURE — 97530 THERAPEUTIC ACTIVITIES: CPT | Performed by: PHYSICAL THERAPIST

## 2021-12-08 PROCEDURE — 97140 MANUAL THERAPY 1/> REGIONS: CPT | Performed by: PHYSICAL THERAPIST

## 2021-12-08 NOTE — PROGRESS NOTES
"Physical Therapy Daily Progress Note        Patient: Ann Perla   : 1960  Diagnosis/ICD-10 Code:  Pain, hip [M25.559]  Referring practitioner: JEFFREY Vuong  Date of Initial Visit: Type: THERAPY  Noted: 10/15/2021  Today's Date: 2021  Patient seen for 10 sessions             Subjective   Ann Perla reports having to missed appointments last week because of a \"Sinus infection\".  She rates her lower back and hip pain at 5/10 upon arrival.      Objective     Bilateral Hip Abductor Strength:  Grossly 4-/5    See Exercise, Manual Logs for complete treatment.       Assessment/Plan    Pt tolerated therapy session well - with progression of therapeutic exercises, CKC-Functional activities, and Manual therapy. She has improved, but continues to have deficits in Trunk and Bilateral Hip/Knee ROM,  Strength, and Stability; limiting function and ability to perform ADLs at this time.    Progress per Plan of Care           Timed:  Manual Therapy:    8     mins  62145;  Therapeutic Exercise:    15     mins  66935;     Neuromuscular Vicente:    0    mins  23446;    Therapeutic Activity:     15     mins  79648;     Gait Trainin     mins  75736;     Ultrasound:     0     mins  91941;    Electrical Stimulation:    0     mins  14600;  Iontophoresis     0     mins  27452    Untimed:  Electrical Stimulation:    0     mins  13260 ( );  Mechanical Traction:    0     mins  64341;   Fluidotherapy     0     mins  00270  Hot pack     0     mins  92813  Cold pack     0     mins  34500    Timed Treatment:   38   mins   Total Treatment:     38   mins        Mariza Temple PTA  Physical Therapist Assistant  "

## 2022-01-19 ENCOUNTER — OFFICE VISIT (OUTPATIENT)
Dept: PODIATRY | Facility: CLINIC | Age: 62
End: 2022-01-19

## 2022-01-19 VITALS
TEMPERATURE: 95.8 F | SYSTOLIC BLOOD PRESSURE: 93 MMHG | BODY MASS INDEX: 24.14 KG/M2 | HEART RATE: 72 BPM | DIASTOLIC BLOOD PRESSURE: 62 MMHG | WEIGHT: 150.2 LBS | OXYGEN SATURATION: 99 % | HEIGHT: 66 IN

## 2022-01-19 DIAGNOSIS — M20.11 HALLUX VALGUS OF RIGHT FOOT: Primary | ICD-10-CM

## 2022-01-19 DIAGNOSIS — M79.671 FOOT PAIN, RIGHT: ICD-10-CM

## 2022-01-19 PROCEDURE — 99213 OFFICE O/P EST LOW 20 MIN: CPT | Performed by: PODIATRIST

## 2022-01-19 NOTE — PROGRESS NOTES
[facility] - PODIATRY    Today's Date: 01/19/22    Patient Name: Ann Perla  MRN: [id number]  [facility]: [id number]  PCP: Estee Figueroa APRN  Referring Provider: No ref. provider found    SUBJECTIVE     Chief Complaint   Patient presents with   • Right Foot - Pain     Great toe turning in      HPI: Ann Perla, a 61 y.o.female, presents to clinic.    New, Established, New Problem: New    Location: Right hallux    Duration: Fall 2021    Onset: Insidious    Nature: Minimal pain    Stable, worsening, improving: Stable    Aggravating factors:  Patient relates pain is aggravated by shoe gear and ambulation.      Previous Treatment: Previous base wedge bunionectomy along with right second and third hammertoe corrections in Summer 2021 by Dr. Zamorano.  She states she has been using spacers between the first and second toes.    Patient denies any fevers, chills, nausea, vomiting, shortness of breath, nor any other constitutional signs nor symptoms.    No other pedal complaints at this time.    Recent medical changes: None    Past Medical History:   Diagnosis Date   • Abdominal pain, LLQ 12/04/2015   • Bunion    • Depressive disorder    • Foot pain, right 04/08/2021   • GERD (gastroesophageal reflux disease)    • Hallux valgus of right foot 12/03/2018   • Hammer toe    • Herpes genitalia    • HLD (hyperlipidemia)    • Migraine    • Mixed incontinence urge and stress    • OAB (overactive bladder) 01/24/2018   • Urinary urgency      Past Surgical History:   Procedure Laterality Date   • CERVICAL FUSION     • COLONOSCOPY  2018, 2016   • CYSTOSCOPY     • CYSTOSCOPY RETROGRADE PYELOGRAM     • ENDOSCOPY  2018   • FOOT SURGERY     • HAND SURGERY Left    • HAND SURGERY Right    • HIATAL HERNIA REPAIR  05/10/2019   • NECK SURGERY     • TUBAL ABDOMINAL LIGATION     • VEIN SURGERY       Family History   Problem Relation Age of Onset   • Stroke Mother    • Hypertension Mother    • Heart disease  Brother    • Kidney nephrosis Brother    • Hypertension Maternal Grandmother    • Diabetes Maternal Grandmother    • Cancer Maternal Grandfather         Unspecified   • Cancer Other         Unspecified   • Cancer Other         Unspecified   • Lung cancer Other      Social History     Socioeconomic History   • Marital status:    Tobacco Use   • Smoking status: Never Smoker   • Smokeless tobacco: Never Used   Vaping Use   • Vaping Use: Never used   Substance and Sexual Activity   • Alcohol use: Not Currently     Comment: nemesio- 7-8-19   • Drug use: Never   • Sexual activity: Defer     No Known Allergies  Current Outpatient Medications   Medication Sig Dispense Refill   • Calcium Carbonate-Vitamin D (calcium-vitamin D) 500-200 MG-UNIT tablet per tablet 500 mg Daily.     • ergocalciferol (ERGOCALCIFEROL) 1.25 MG (08477 UT) capsule Take 50,000 Units by mouth.     • levothyroxine (Synthroid) 50 MCG tablet Synthroid 50 mcg tablet     • Linzess 72 MCG capsule capsule 72 mcg Every Morning Before Breakfast.     • pravastatin (PRAVACHOL) 40 MG tablet Take 40 mg by mouth Daily.     • PreviDent 5000 Plus 1.1 % cream As Needed.     • propranolol LA (INDERAL LA) 60 MG 24 hr capsule propranolol ER 60 mg capsule,24 hr,extended release     • QUEtiapine (SEROquel) 200 MG tablet Take 200 mg by mouth.     • simvastatin (ZOCOR) 20 MG tablet 20 mg Every Night.     • solifenacin (VESICARE) 5 MG tablet Take 5 mg by mouth Daily.     • topiramate (TOPAMAX) 100 MG tablet 100 mg Daily.     • tretinoin (RETIN-A) 0.025 % cream Apply  topically to the appropriate area as directed Every Night.     • valACYclovir (VALTREX) 500 MG tablet valacyclovir 500 mg tablet     • vilazodone (Viibryd) 40 MG tablet tablet Viibryd 40 mg tablet     • Xiidra 5 % ophthalmic solution 1 (One) Time.       No current facility-administered medications for this visit.     Review of Systems   Constitutional: Negative.    Musculoskeletal:        Right bunion  deformity   All other systems reviewed and are negative.      OBJECTIVE     Vitals:    01/19/22 0756   BP: 93/62   Pulse: 72   Temp: 95.8 °F (35.4 °C)   SpO2: 99%       WBC   Date Value Ref Range Status   05/18/2020 5.02 4.80 - 10.80 10*3/uL Final     RBC   Date Value Ref Range Status   05/18/2020 4.13 (L) 4.20 - 5.40 10*6/uL Final     Hemoglobin   Date Value Ref Range Status   05/18/2020 13.1 12.0 - 16.0 g/dL Final     Hematocrit   Date Value Ref Range Status   05/18/2020 41.7 37.0 - 47.0 % Final     MCV   Date Value Ref Range Status   05/18/2020 101.0 (H) 81.0 - 99.0 fL Final     MCH   Date Value Ref Range Status   05/18/2020 31.7 (H) 27.0 - 31.0 pg Final     MCHC   Date Value Ref Range Status   05/18/2020 31.4 (L) 33.0 - 37.0 Final     RDW   Date Value Ref Range Status   05/18/2020 12.8 11.7 - 14.4 % Final     RDW-SD   Date Value Ref Range Status   05/18/2020 47.3 (H) 36.4 - 46.3 fL Final     MPV   Date Value Ref Range Status   05/18/2020 10.6 9.4 - 12.3 fL Final     Platelets   Date Value Ref Range Status   05/18/2020 268 130 - 400 10*3/uL Final     Neutrophil Rel %   Date Value Ref Range Status   05/18/2020 55.9 30.0 - 85.0 % Final     Lymphocyte Rel %   Date Value Ref Range Status   05/18/2020 32.3 20.0 - 45.0 % Final     Monocyte Rel %   Date Value Ref Range Status   05/18/2020 7.4 3.0 - 10.0 % Final     Eosinophil Rel %   Date Value Ref Range Status   05/18/2020 3.2 0.0 - 7.0 % Final     Basophil Rel %   Date Value Ref Range Status   05/18/2020 1.0 0.0 - 3.0 % Final     Neutrophils Absolute   Date Value Ref Range Status   05/18/2020 2.81 2.00 - 8.00 10*3/uL Final     Lymphocytes Absolute   Date Value Ref Range Status   05/18/2020 1.62 1.00 - 5.00 10*3/uL Final     Monocytes Absolute   Date Value Ref Range Status   05/18/2020 0.37 0.20 - 1.20 10*3/uL Final     Eosinophils Absolute   Date Value Ref Range Status   05/18/2020 0.16 0.00 - 0.70 10*3/uL Final     Basophils Absolute   Date Value Ref Range Status    05/18/2020 0.05 0.00 - 0.20 10*3/uL Final     NRBC   Date Value Ref Range Status   05/18/2020 0.00 0.00 - 0.70 % Final         Lab Results   Component Value Date    GLUCOSE 148 (H) 12/17/2020    BUN 13 12/17/2020    CREATININE 0.96 (H) 12/17/2020    BCR 14 12/17/2020    K 4.3 12/17/2020    CO2 23 12/17/2020    CALCIUM 9.3 12/17/2020    ALBUMIN 4.4 05/18/2020    LABIL2 1.6 05/18/2020    AST 26 05/18/2020    ALT 17 05/18/2020       Patient seen in no apparent distress.      PHYSICAL EXAM:     Foot/Ankle Exam:       General:   Appearance: appears stated age and healthy    Orientation: AAOx3    Affect: appropriate    Gait: unimpaired    Shoe Gear:  Casual shoes    VASCULAR      Right Foot Vascularity   Normal vascular exam    Dorsalis pedis:  2+  Posterior tibial:  2+  Skin Temperature: warm    Edema Grading:  None  CFT:  < 3 seconds  Pedal Hair Growth:  Present  Varicosities: none       Left Foot Vascularity   Normal vascular exam    Dorsalis pedis:  2+  Posterior tibial:  2+  Skin Temperature: warm    Edema Grading:  None  CFT:  < 3 seconds  Pedal Hair Growth:  Present  Varicosities: none        NEUROLOGIC     Right Foot Neurologic   Normal sensation    Light touch sensation:  Normal  Vibratory sensation:  Normal  Hot/Cold sensation: normal    Protective Sensation using [place] Monofilament:  10     Left Foot Neurologic   Normal sensation    Light touch sensation:  Normal  Vibratory sensation:  Normal  Hot/cold sensation: normal    Protective Sensation using [place] Monofilament:  10     MUSCULOSKELETAL      Right Foot Musculoskeletal   Hallux valgus: Yes (At MPJ with rectus first metatarsal shaft.)       MUSCLE STRENGTH     Right Foot Muscle Strength   Normal strength    Foot dorsiflexion:  5  Foot plantar flexion:  5  Foot inversion:  5  Foot eversion:  5     Left Foot Muscle Strength   Foot dorsiflexion:  5  Foot plantar flexion:  5  Foot inversion:  5  Foot eversion:  5     RANGE OF MOTION       Right Foot Range of Motion   Foot and ankle ROM within normal limits       Left Foot Range of Motion   Foot and ankle ROM within normal limits       DERMATOLOGIC     Right Foot Dermatologic   Skin: skin intact    Nails: normal       Left Foot Dermatologic   Skin: skin intact    Nails: normal        RADIOLOGY:      No results found.    ASSESSMENT/PLAN     Diagnoses and all orders for this visit:    1. Hallux valgus of right foot (Primary)    2. Foot pain, right        Comprehensive lower extremity examination and evaluation was performed.    Discussed findings and treatment plan including risks, benefits, and treatment options with patient in detail. Patient agreed with treatment plan.    Medications and allergies reviewed.  Reviewed available lab values along with other pertinent labs.  These were discussed with the patient.    Recommended surgery: Akin bunionectomy of right proximal phalanx of the hallux.  Upon discussion of surgical correction, post-operative requirements along with risk and benefits of the surgery, the patient states they do not want to pursue surgery at this time.      An After Visit Summary was printed and given to the patient at discharge, including (if requested) any available informative/educational handouts regarding diagnosis, treatment, or medications. All questions were answered to patient/family satisfaction. Should symptoms fail to improve or worsen they agree to call or return to clinic or to go to the Emergency Department. Discussed the importance of following up with any needed screening tests/labs/specialist appointments and any requested follow-up recommended by me today. Importance of maintaining follow-up discussed and patient accepts that missed appointments can delay diagnosis and potentially lead to worsening of conditions.    Return if symptoms worsen or fail to improve., or sooner if acute issues arise.    This document has been electronically signed by Lawrence Zamorano  SHANTA on January 19, 2022 08:32 EST

## 2022-01-21 ENCOUNTER — OFFICE VISIT (OUTPATIENT)
Dept: UROLOGY | Facility: CLINIC | Age: 62
End: 2022-01-21

## 2022-01-21 VITALS
HEART RATE: 54 BPM | SYSTOLIC BLOOD PRESSURE: 104 MMHG | WEIGHT: 150 LBS | BODY MASS INDEX: 24.11 KG/M2 | TEMPERATURE: 97.5 F | DIASTOLIC BLOOD PRESSURE: 69 MMHG | HEIGHT: 66 IN

## 2022-01-21 DIAGNOSIS — N32.81 OAB (OVERACTIVE BLADDER): Primary | ICD-10-CM

## 2022-01-21 LAB
BACTERIA UR QL AUTO: NORMAL /HPF
BILIRUB BLD-MCNC: NEGATIVE MG/DL
CLARITY, POC: CLEAR
COLOR UR: YELLOW
EPI CELLS #/AREA URNS HPF: 0 /[HPF]
EXPIRATION DATE: NORMAL
GLUCOSE UR STRIP-MCNC: NEGATIVE MG/DL
KETONES UR QL: NEGATIVE
LEUKOCYTE EST, POC: NEGATIVE
Lab: NORMAL
NITRITE UR-MCNC: NEGATIVE MG/ML
PH UR: 7 [PH] (ref 5–8)
PROT UR STRIP-MCNC: NEGATIVE MG/DL
RBC # UR STRIP: NEGATIVE /UL
RBC # UR STRIP: NORMAL /HPF
RENAL EPITHELIAL, POC: 0
SP GR UR: 1.01 (ref 1–1.03)
UNIDENT CRYS URNS QL MICRO: NORMAL /HPF
UROBILINOGEN UR QL: NORMAL
WBC # UR STRIP: NORMAL /HPF

## 2022-01-21 PROCEDURE — 99213 OFFICE O/P EST LOW 20 MIN: CPT | Performed by: UROLOGY

## 2022-01-21 PROCEDURE — 81003 URINALYSIS AUTO W/O SCOPE: CPT | Performed by: UROLOGY

## 2022-01-21 RX ORDER — SOLIFENACIN SUCCINATE 5 MG/1
5 TABLET, FILM COATED ORAL DAILY
Qty: 90 TABLET | Refills: 3 | Status: SHIPPED | OUTPATIENT
Start: 2022-01-21 | End: 2022-04-21

## 2022-01-21 NOTE — PROGRESS NOTES
"Chief Complaint  Follow-up (oab)    Patient is on Vesicare 5 mg daily.    Patient needs to be followed up to make sure there is no side effects from the medication and it is still a viable treatment for the patient which is the reason she is here today    Subjective          Annlawrence Perla presents to [facility] UROLOGY  History of Present Illness    61-year-old white female has overactive urinary bladder for several years now and has been on Vesicare 5 mg daily with good results.  She has occasional dysuria but no gross hematuria.  On the medication her overactive bladder assessment screen is 3/25.  Patient does get constipation and dry mouth but she is very happy with the medication and do not want to change.  I have discussed with her about dementia with chronic use of Vesicare which she accepts.  Patient is pleased with her urinary symptoms at this time.    Patient is not incontinent    Objective   Vital Signs:   /69   Pulse 54   Temp 97.5 °F (36.4 °C)   Ht 167.6 cm (66\")   Wt 68 kg (150 lb)   BMI 24.21 kg/m²     No Known Allergies   Past medical history:  has a past medical history of Abdominal pain, LLQ (12/04/2015), Back pain, Bunion, Depressive disorder, Foot pain, right (04/08/2021), GERD (gastroesophageal reflux disease), Hallux valgus of right foot (12/03/2018), Hammer toe, Herpes genitalia, HLD (hyperlipidemia), Migraine, Mixed incontinence urge and stress, OAB (overactive bladder) (01/24/2018), and Urinary urgency.   Past surgical history:  has a past surgical history that includes Cervical fusion; Colonoscopy (2018, 2016); Cystoscopy; Esophagogastroduodenoscopy (2018); Foot surgery; Hand surgery (Left); Hand surgery (Right); Hiatal hernia repair (05/10/2019); Neck surgery; Retrograde pyelogram; Tubal ligation; and Vein Surgery.  Personal history: family history includes Cancer in her maternal grandfather and other family members; Diabetes in her maternal grandmother; " Heart disease in her brother; Hypertension in her maternal grandmother and mother; Kidney nephrosis in her brother; Lung cancer in an other family member; Stroke in her mother.  Social history:  reports that she has never smoked. She has never used smokeless tobacco. She reports previous alcohol use. She reports that she does not use drugs.    Review of Systems    No change from before    Physical Exam  Constitutional:       General: She is not in acute distress.     Appearance: Normal appearance. She is normal weight. She is not ill-appearing or toxic-appearing.   HENT:      Head: Normocephalic and atraumatic.   Eyes:      Pupils: Pupils are equal, round, and reactive to light.   Cardiovascular:      Rate and Rhythm: Normal rate and regular rhythm.      Pulses: Normal pulses.      Heart sounds: Normal heart sounds. No murmur heard.      Pulmonary:      Effort: Pulmonary effort is normal.      Breath sounds: Normal breath sounds. No rhonchi or rales.   Abdominal:      General: Abdomen is flat. There is no distension.      Palpations: Abdomen is soft.      Tenderness: There is no abdominal tenderness. There is no right CVA tenderness or left CVA tenderness.      Hernia: A hernia is present.   Musculoskeletal:         General: No swelling. Normal range of motion.      Cervical back: Normal range of motion and neck supple. No rigidity or tenderness.   Lymphadenopathy:      Cervical: No cervical adenopathy.   Skin:     General: Skin is warm.      Coloration: Skin is not jaundiced.   Neurological:      General: No focal deficit present.      Mental Status: She is alert and oriented to person, place, and time.      Motor: No weakness.      Gait: Gait normal.   Psychiatric:         Mood and Affect: Mood normal.         Behavior: Behavior normal.         Thought Content: Thought content normal.         Judgment: Judgment normal.        Result Review :                 Assessment and Plan    Diagnoses and all orders for this  visit:    1. OAB (overactive bladder) (Primary)  -     POC Urinalysis Dipstick, Automated  -     solifenacin (VESICARE) 5 MG tablet; Take 1 tablet by mouth Daily for 90 days.  Dispense: 90 tablet; Refill: 3        Follow Up   No follow-ups on file.  Patient was given instructions and counseling regarding her condition or for health maintenance advice. Please see specific information pulled into the AVS if appropriate.     Lizz King MD

## 2022-03-15 ENCOUNTER — TRANSCRIBE ORDERS (OUTPATIENT)
Dept: ADMINISTRATIVE | Facility: HOSPITAL | Age: 62
End: 2022-03-15

## 2022-03-15 DIAGNOSIS — K44.9 HIATAL HERNIA: Primary | ICD-10-CM

## 2022-03-15 DIAGNOSIS — K31.1 PYLORIC STENOSIS: ICD-10-CM

## 2022-03-15 DIAGNOSIS — K21.9 GASTROESOPHAGEAL REFLUX DISEASE WITH APNEA: ICD-10-CM

## 2022-03-15 DIAGNOSIS — R06.81 GASTROESOPHAGEAL REFLUX DISEASE WITH APNEA: ICD-10-CM

## 2022-03-24 ENCOUNTER — HOSPITAL ENCOUNTER (OUTPATIENT)
Dept: NUCLEAR MEDICINE | Facility: HOSPITAL | Age: 62
Discharge: HOME | End: 2022-03-24
Admitting: NURSE PRACTITIONER

## 2022-03-24 DIAGNOSIS — K21.9 GASTROESOPHAGEAL REFLUX DISEASE WITH APNEA: ICD-10-CM

## 2022-03-24 DIAGNOSIS — K44.9 HIATAL HERNIA: ICD-10-CM

## 2022-03-24 DIAGNOSIS — K31.1 PYLORIC STENOSIS: ICD-10-CM

## 2022-03-24 DIAGNOSIS — R06.81 GASTROESOPHAGEAL REFLUX DISEASE WITH APNEA: ICD-10-CM

## 2022-03-24 PROCEDURE — 78264 GASTRIC EMPTYING IMG STUDY: CPT

## 2022-03-24 PROCEDURE — 0 TECHNETIUM SULFUR COLLOID: Performed by: NURSE PRACTITIONER

## 2022-03-24 PROCEDURE — A9541 TC99M SULFUR COLLOID: HCPCS | Performed by: NURSE PRACTITIONER

## 2022-03-24 RX ADMIN — TECHNETIUM TC 99M SULFUR COLLOID 1 DOSE: KIT at 08:07

## 2022-07-12 ENCOUNTER — DOCUMENTATION (OUTPATIENT)
Dept: PHYSICAL THERAPY | Facility: CLINIC | Age: 62
End: 2022-07-12

## 2022-07-12 NOTE — PROGRESS NOTES
Discharge Summary  Discharge Summary from Physical Therapy Report      Dates  PT visit: 10/15/21-12/8/21  Number of Visits: 10         Goals: Not Met    Discharge Plan: Continue with current home exercise program as instructed        Date of Discharge Pt last seen on 12/8/22        Joi Buck PT  Physical Therapist

## 2022-07-21 ENCOUNTER — OFFICE VISIT (OUTPATIENT)
Dept: UROLOGY | Facility: CLINIC | Age: 62
End: 2022-07-21

## 2022-07-21 VITALS
DIASTOLIC BLOOD PRESSURE: 75 MMHG | TEMPERATURE: 98 F | HEIGHT: 66 IN | HEART RATE: 77 BPM | SYSTOLIC BLOOD PRESSURE: 118 MMHG | BODY MASS INDEX: 24.11 KG/M2 | WEIGHT: 150 LBS

## 2022-07-21 DIAGNOSIS — R30.0 DYSURIA: ICD-10-CM

## 2022-07-21 DIAGNOSIS — N32.81 OAB (OVERACTIVE BLADDER): Primary | ICD-10-CM

## 2022-07-21 LAB
BACTERIA UR QL AUTO: NORMAL /HPF
BILIRUB BLD-MCNC: NEGATIVE MG/DL
CLARITY, POC: CLEAR
COLOR UR: YELLOW
EPI CELLS #/AREA URNS HPF: 0 /[HPF]
EXPIRATION DATE: NORMAL
GLUCOSE UR STRIP-MCNC: NEGATIVE MG/DL
KETONES UR QL: NEGATIVE
LEUKOCYTE EST, POC: NEGATIVE
Lab: NORMAL
NITRITE UR-MCNC: NEGATIVE MG/ML
PH UR: 6 [PH] (ref 5–8)
PROT UR STRIP-MCNC: NEGATIVE MG/DL
RBC # UR STRIP: NEGATIVE /UL
RBC # UR STRIP: NORMAL /HPF
RENAL EPITHELIAL, POC: 0
SP GR UR: 1.01 (ref 1–1.03)
UNIDENT CRYS URNS QL MICRO: NORMAL /HPF
UROBILINOGEN UR QL: NORMAL
WBC # UR STRIP: NORMAL /HPF

## 2022-07-21 PROCEDURE — 87086 URINE CULTURE/COLONY COUNT: CPT | Performed by: UROLOGY

## 2022-07-21 PROCEDURE — 81003 URINALYSIS AUTO W/O SCOPE: CPT | Performed by: UROLOGY

## 2022-07-21 PROCEDURE — 99213 OFFICE O/P EST LOW 20 MIN: CPT | Performed by: UROLOGY

## 2022-07-21 RX ORDER — VIBEGRON 75 MG/1
75 TABLET, FILM COATED ORAL DAILY
Qty: 90 TABLET | Refills: 3 | Status: SHIPPED | OUTPATIENT
Start: 2022-07-21 | End: 2022-10-19

## 2022-07-21 RX ORDER — ERYTHROMYCIN 250 MG/1
TABLET, COATED ORAL
COMMUNITY
Start: 2022-03-30 | End: 2023-02-14 | Stop reason: SDUPTHER

## 2022-07-21 NOTE — PROGRESS NOTES
"Chief Complaint  Follow-up and oab    Dysuria    Subjective Has been having a lot of frequency, dysuria and only dribbles when she has to urinate.        Ann Perla presents to [facility] UROLOGY  History of Present Illness    Patient has been on Vesicare for overactive urinary bladder for the last several weeks she has been having frequency and dysuria which comes and goes.  Last only few days a week.  She has pain in the urethra before she urinates and during urination.  Continues to have urgency but no incontinence.  According to her her blood test for BUN/creatinine are normal.  She has no fever or chills.    Patient has dry mouth and has to drink a lot of fluid at nighttime and also is constipated.  No gross hematuria.  Her overactive assessment tool is 4/25    Objective She is in no acute distress.  Vital Signs:   /75   Pulse 77   Temp 98 °F (36.7 °C)   Ht 167.6 cm (66\")   Wt 68 kg (150 lb)   BMI 24.21 kg/m²     No Known Allergies   Past medical history:  has a past medical history of Abdominal pain, LLQ (12/04/2015), Back pain, Bunion, Depressive disorder, Foot pain, right (04/08/2021), GERD (gastroesophageal reflux disease), Hallux valgus of right foot (12/03/2018), Hammer toe, Herpes genitalia, HLD (hyperlipidemia), Migraine, Mixed incontinence urge and stress, OAB (overactive bladder) (01/24/2018), and Urinary urgency.   Past surgical history:  has a past surgical history that includes Cervical fusion; Colonoscopy (2018, 2016); Cystoscopy; Esophagogastroduodenoscopy (2018); Foot surgery; Hand surgery (Left); Hand surgery (Right); Hiatal hernia repair (05/10/2019); Neck surgery; Retrograde pyelogram; Tubal ligation; and Vein Surgery.  Personal history: family history includes Cancer in her maternal grandfather and other family members; Diabetes in her maternal grandmother; Heart disease in her brother; Hypertension in her maternal grandmother and mother; Kidney " nephrosis in her brother; Lung cancer in an other family member; Stroke in her mother.  Social history:  reports that she has never smoked. She has never used smokeless tobacco. She reports previous alcohol use. She reports that she does not use drugs.    Review of Systems    No change from before    Physical Exam  Constitutional:       General: She is not in acute distress.     Appearance: Normal appearance. She is normal weight. She is not ill-appearing or toxic-appearing.   HENT:      Head: Normocephalic and atraumatic.      Ears:      Comments: No hearing loss  Cardiovascular:      Rate and Rhythm: Normal rate and regular rhythm.      Pulses: Normal pulses.      Heart sounds: Normal heart sounds. No murmur heard.  Pulmonary:      Effort: Pulmonary effort is normal.      Breath sounds: Normal breath sounds. No rhonchi or rales.   Abdominal:      General: Abdomen is flat.      Palpations: Abdomen is soft. There is no mass.      Tenderness: There is no abdominal tenderness. There is no right CVA tenderness or left CVA tenderness.   Musculoskeletal:         General: Normal range of motion.      Cervical back: Normal range of motion and neck supple. No rigidity or tenderness.   Lymphadenopathy:      Cervical: No cervical adenopathy.   Skin:     General: Skin is warm.      Coloration: Skin is not jaundiced.   Neurological:      General: No focal deficit present.      Mental Status: She is alert and oriented to person, place, and time.      Motor: No weakness.      Gait: Gait normal.   Psychiatric:         Mood and Affect: Mood normal.         Behavior: Behavior normal.         Thought Content: Thought content normal.         Judgment: Judgment normal.        Result Review :                 Assessment and Plan    Diagnoses and all orders for this visit:    1. Dysuria (Primary)  -     POC Urine Microscopic Only  -     Urine Culture - Urine, Urine, Clean Catch    2. OAB (overactive bladder)  -     POC Urinalysis  Dipstick, Automated  -     POC Urine Microscopic Only  -     Urine Culture - Urine, Urine, Clean Catch    Patient having dry mouth and constipation.  I checked urine under microscope because of dysuria and do not see any evidence of urinary tract infection.  We will send the urine for culture and I am going to switch her over to Gemtesa 75 mg daily which has less side effects especially constipation dry mouth and memory loss.    No incontinence     Brief Urine Lab Results  (Last result in the past 365 days)      Color   Clarity   Blood   Leuk Est   Nitrite   Protein   CREAT   Urine HCG        07/21/22 0937 Yellow   Clear   Negative   Negative   Negative   Negative                  Follow Up   No follow-ups on file.  Patient was given instructions and counseling regarding her condition or for health maintenance advice. Please see specific information pulled into the AVS if appropriate.     Lizz King MD

## 2022-07-22 LAB — BACTERIA SPEC AEROBE CULT: NO GROWTH

## 2022-08-12 ENCOUNTER — PROCEDURE VISIT (OUTPATIENT)
Dept: UROLOGY | Facility: CLINIC | Age: 62
End: 2022-08-12

## 2022-08-12 DIAGNOSIS — R30.0 DYSURIA: Primary | ICD-10-CM

## 2022-08-12 DIAGNOSIS — R39.15 URINARY URGENCY: ICD-10-CM

## 2022-08-12 PROCEDURE — 52000 CYSTOURETHROSCOPY: CPT | Performed by: UROLOGY

## 2022-08-12 NOTE — PROGRESS NOTES
Cystoscopy    Date/Time: 8/12/2022 11:00 AM  Performed by: Lizz King MD  Authorized by: Lizz King MD   Preparation: Patient was prepped and draped in the usual sterile fashion.  Local anesthesia used: yes    Anesthesia:  Local anesthesia used: yes  Local Anesthetic: topical anesthetic  Anesthetic total: 12 mL    Sedation:  Patient sedated: no        Indication.  Dysuria but negative culture    Urinary urgency      Patient was placed in lithotomy position.  Thorough scrubbing of lower abdomen and external genitalia was performed with Hibiclens.  Flexible Olympus cystoscope was inserted into the urethra.  Urethra looks fine and bladder neck is normal.  Both ureteral orifices are normal.  Bladder was checked very carefully and there is no tumor present.  Urinary bladder looks fine and I do not see any evidence of vesicocolic fistula.  Flexible cystoscope was removed.  I made her cough and there was no incontinence present.   She tolerated her procedure very well.    Patient is doing better with Gemtesa and no dysuria present at this time.  I am going to see her in 6 months time and will continue Gemtesa.

## 2022-11-11 ENCOUNTER — TRANSCRIBE ORDERS (OUTPATIENT)
Dept: ADMINISTRATIVE | Facility: HOSPITAL | Age: 62
End: 2022-11-11

## 2022-11-11 DIAGNOSIS — M81.0 SENILE OSTEOPOROSIS: Primary | ICD-10-CM

## 2022-11-15 PROBLEM — M81.0 AGE-RELATED OSTEOPOROSIS WITHOUT CURRENT PATHOLOGICAL FRACTURE: Status: ACTIVE | Noted: 2022-11-15

## 2022-11-17 ENCOUNTER — HOSPITAL ENCOUNTER (OUTPATIENT)
Dept: INFUSION THERAPY | Facility: HOSPITAL | Age: 62
Discharge: HOME | End: 2022-11-17
Admitting: NURSE PRACTITIONER

## 2022-11-17 VITALS
OXYGEN SATURATION: 100 % | WEIGHT: 149.91 LBS | TEMPERATURE: 97.9 F | BODY MASS INDEX: 24.09 KG/M2 | DIASTOLIC BLOOD PRESSURE: 68 MMHG | HEART RATE: 61 BPM | SYSTOLIC BLOOD PRESSURE: 111 MMHG | HEIGHT: 66 IN | RESPIRATION RATE: 18 BRPM

## 2022-11-17 DIAGNOSIS — M81.0 AGE-RELATED OSTEOPOROSIS WITHOUT CURRENT PATHOLOGICAL FRACTURE: Primary | ICD-10-CM

## 2022-11-17 PROCEDURE — 25010000002 DENOSUMAB 60 MG/ML SOLUTION PREFILLED SYRINGE: Performed by: NURSE PRACTITIONER

## 2022-11-17 PROCEDURE — 96372 THER/PROPH/DIAG INJ SC/IM: CPT

## 2022-11-17 RX ADMIN — DENOSUMAB 60 MG: 60 INJECTION SUBCUTANEOUS at 09:52

## 2022-12-01 ENCOUNTER — TRANSCRIBE ORDERS (OUTPATIENT)
Dept: ADMINISTRATIVE | Facility: HOSPITAL | Age: 62
End: 2022-12-01

## 2022-12-01 DIAGNOSIS — Z92.89 HISTORY OF MAMMOGRAPHY, SCREENING: Primary | ICD-10-CM

## 2023-02-08 ENCOUNTER — TELEPHONE (OUTPATIENT)
Dept: GASTROENTEROLOGY | Facility: CLINIC | Age: 63
End: 2023-02-08
Payer: MEDICARE

## 2023-02-08 NOTE — TELEPHONE ENCOUNTER
Patient called stating that she had received a letter about needing to have a colonoscopy. Patient was due for 5 year colonoscopy in Jan 2023. I went to scheduled the phone consults, and patient stated that she is having trouble swallowing. Informed patient that since it has been more than 3 years since patient was seen in office that we would need a referral from the provider due to having  New symptoms.

## 2023-02-14 ENCOUNTER — OFFICE VISIT (OUTPATIENT)
Dept: UROLOGY | Facility: CLINIC | Age: 63
End: 2023-02-14
Payer: MEDICARE

## 2023-02-14 VITALS
WEIGHT: 149 LBS | SYSTOLIC BLOOD PRESSURE: 107 MMHG | HEART RATE: 67 BPM | DIASTOLIC BLOOD PRESSURE: 74 MMHG | BODY MASS INDEX: 23.95 KG/M2 | HEIGHT: 66 IN | TEMPERATURE: 98 F

## 2023-02-14 DIAGNOSIS — R30.0 DYSURIA: ICD-10-CM

## 2023-02-14 DIAGNOSIS — R39.15 URINARY URGENCY: Primary | ICD-10-CM

## 2023-02-14 LAB
BILIRUB BLD-MCNC: NEGATIVE MG/DL
CLARITY, POC: CLEAR
COLOR UR: YELLOW
EXPIRATION DATE: NORMAL
GLUCOSE UR STRIP-MCNC: NEGATIVE MG/DL
KETONES UR QL: NEGATIVE
LEUKOCYTE EST, POC: NEGATIVE
Lab: NORMAL
NITRITE UR-MCNC: NEGATIVE MG/ML
PH UR: 6 [PH] (ref 5–8)
PROT UR STRIP-MCNC: NEGATIVE MG/DL
RBC # UR STRIP: NEGATIVE /UL
SP GR UR: 1.01 (ref 1–1.03)
UROBILINOGEN UR QL: NORMAL

## 2023-02-14 PROCEDURE — 81003 URINALYSIS AUTO W/O SCOPE: CPT | Performed by: UROLOGY

## 2023-02-14 PROCEDURE — 99213 OFFICE O/P EST LOW 20 MIN: CPT | Performed by: UROLOGY

## 2023-02-14 RX ORDER — METRONIDAZOLE 7.5 MG/G
LOTION TOPICAL
COMMUNITY
Start: 2023-01-11

## 2023-02-14 RX ORDER — POLYETHYLENE GLYCOL 3350 17 G/17G
POWDER, FOR SOLUTION ORAL
COMMUNITY
Start: 2023-02-13

## 2023-02-14 RX ORDER — DICYCLOMINE HYDROCHLORIDE 10 MG/1
CAPSULE ORAL
COMMUNITY
Start: 2023-02-13

## 2023-02-14 RX ORDER — VIBEGRON 75 MG/1
TABLET, FILM COATED ORAL
COMMUNITY
Start: 2023-01-22 | End: 2023-02-14 | Stop reason: SDUPTHER

## 2023-02-14 RX ORDER — VIBEGRON 75 MG/1
75 TABLET, FILM COATED ORAL DAILY
Qty: 90 TABLET | Refills: 3 | Status: SHIPPED | OUTPATIENT
Start: 2023-02-14 | End: 2023-05-15

## 2023-02-14 RX ORDER — FLUCONAZOLE 150 MG/1
TABLET ORAL
COMMUNITY
Start: 2023-01-26

## 2023-02-14 RX ORDER — SIMETHICONE 80 MG
TABLET,CHEWABLE ORAL
COMMUNITY
Start: 2023-02-13

## 2023-02-14 RX ORDER — ERYTHROMYCIN 250 MG/1
CAPSULE, DELAYED RELEASE ORAL
COMMUNITY
Start: 2023-02-13

## 2023-02-14 NOTE — PROGRESS NOTES
"Chief Complaint  Follow-up for urgency and urgency incontinence    Subjective  No acute distress        Ann Perla presents to [facility] UROLOGY  History of Present Illness    Patient is in remission from dysuria and urgency secondary to Gemtesa.  She has no incontinence.  She gets up only once at night and in the daytime urinates about 1-6 times.  Urgency is very occasional.  Her overactive bladder assessment tool is only 2/25.    Continues to have dry throat but she is on multiple medications.  No history of urinary tract infection.  Patient is very happy with Gemtesa    Objective No acute distress  Vital Signs:   /74   Pulse 67   Temp 98 °F (36.7 °C)   Ht 167.6 cm (66\")   Wt 67.6 kg (149 lb)   BMI 24.05 kg/m²     No Known Allergies   Past medical history:  has a past medical history of Abdominal pain, LLQ (12/04/2015), Back pain, Bunion, Depressive disorder, Foot pain, right (04/08/2021), GERD (gastroesophageal reflux disease), Hallux valgus of right foot (12/03/2018), Hammer toe, Herpes genitalia, HLD (hyperlipidemia), Migraine, Mixed incontinence urge and stress, OAB (overactive bladder) (01/24/2018), and Urinary urgency.   Past surgical history:  has a past surgical history that includes Cervical fusion; Colonoscopy (2018, 2016); Cystoscopy; Esophagogastroduodenoscopy (2018); Foot surgery; Hand surgery (Left); Hand surgery (Right); Hiatal hernia repair (05/10/2019); Neck surgery; Retrograde pyelogram; Tubal ligation; and Vein Surgery.  Personal history: family history includes Cancer in her maternal grandfather and other family members; Diabetes in her maternal grandmother; Heart disease in her brother; Hypertension in her maternal grandmother and mother; Kidney nephrosis in her brother; Lung cancer in an other family member; Stroke in her mother.  Social history:  reports that she has never smoked. She has never used smokeless tobacco. She reports that she does not " currently use alcohol. She reports that she does not use drugs.    Review of Systems    No change from before except she has gastroparesis    Physical Exam  Constitutional:       General: She is not in acute distress.     Appearance: Normal appearance. She is normal weight. She is not ill-appearing or toxic-appearing.   HENT:      Head: Normocephalic and atraumatic.      Ears:      Comments: No hearing loss  Neck:      Comments: Scar at the base of right neck of previous operation  Cardiovascular:      Rate and Rhythm: Normal rate and regular rhythm.      Pulses: Normal pulses.      Heart sounds: Normal heart sounds. No murmur heard.  Pulmonary:      Effort: Pulmonary effort is normal. No respiratory distress.      Breath sounds: Normal breath sounds. No rhonchi or rales.   Abdominal:      Palpations: Abdomen is soft. There is no mass.      Tenderness: There is no abdominal tenderness. There is no right CVA tenderness or left CVA tenderness.   Musculoskeletal:         General: Normal range of motion.      Cervical back: Normal range of motion and neck supple. No rigidity or tenderness.      Right lower leg: No edema.      Left lower leg: No edema.   Lymphadenopathy:      Cervical: No cervical adenopathy.   Skin:     General: Skin is warm.      Coloration: Skin is not jaundiced.   Neurological:      General: No focal deficit present.      Mental Status: She is alert and oriented to person, place, and time.      Motor: No weakness.      Gait: Gait normal.   Psychiatric:         Mood and Affect: Mood normal.         Behavior: Behavior normal.         Thought Content: Thought content normal.         Judgment: Judgment normal.        Result Review :                 Assessment and Plan    Diagnoses and all orders for this visit:    1. Urinary urgency (Primary)  -     Gemtesa 75 MG tablet; Take 1 tablet by mouth Daily for 90 days.  Dispense: 90 tablet; Refill: 3    2. Dysuria  -     POC Urinalysis Dipstick, Automated  -      Gemtesa 75 MG tablet; Take 1 tablet by mouth Daily for 90 days.  Dispense: 90 tablet; Refill: 3      Patient is asymptomatic from urinary urgency and dysuria so long as she is on Gemtesa 75 mg daily.  I will continue that medication and recheck her in 6 months time.  Brief Urine Lab Results  (Last result in the past 365 days)      Color   Clarity   Blood   Leuk Est   Nitrite   Protein   CREAT   Urine HCG        02/14/23 1048 Yellow   Clear   Negative   Negative   Negative   Negative                  Follow Up   No follow-ups on file.  Patient was given instructions and counseling regarding her condition or for health maintenance advice. Please see specific information pulled into the AVS if appropriate.     Lizz King MD

## 2023-02-21 NOTE — TELEPHONE ENCOUNTER
Returned patient phone call, the office has received an updated referral.  Scheduled patient for next available 05/17/2023 at 1:00 pm

## 2023-02-24 ENCOUNTER — HOSPITAL ENCOUNTER (OUTPATIENT)
Dept: MAMMOGRAPHY | Facility: HOSPITAL | Age: 63
Discharge: HOME | End: 2023-02-24
Admitting: NURSE PRACTITIONER
Payer: MEDICARE

## 2023-02-24 DIAGNOSIS — Z92.89 HISTORY OF MAMMOGRAPHY, SCREENING: ICD-10-CM

## 2023-02-24 PROCEDURE — 77067 SCR MAMMO BI INCL CAD: CPT

## 2023-02-24 PROCEDURE — 77063 BREAST TOMOSYNTHESIS BI: CPT

## 2023-03-07 ENCOUNTER — TRANSCRIBE ORDERS (OUTPATIENT)
Dept: ADMINISTRATIVE | Facility: HOSPITAL | Age: 63
End: 2023-03-07
Payer: MEDICARE

## 2023-03-07 DIAGNOSIS — R92.8 ABNORMAL MAMMOGRAM: Primary | ICD-10-CM

## 2023-03-24 ENCOUNTER — HOSPITAL ENCOUNTER (OUTPATIENT)
Dept: MAMMOGRAPHY | Facility: HOSPITAL | Age: 63
Discharge: HOME | End: 2023-03-24
Payer: MEDICARE

## 2023-03-24 ENCOUNTER — HOSPITAL ENCOUNTER (OUTPATIENT)
Dept: ULTRASOUND IMAGING | Facility: HOSPITAL | Age: 63
Discharge: HOME | End: 2023-03-24
Payer: MEDICARE

## 2023-03-24 DIAGNOSIS — R92.8 ABNORMAL MAMMOGRAM: ICD-10-CM

## 2023-03-24 PROCEDURE — G0279 TOMOSYNTHESIS, MAMMO: HCPCS

## 2023-03-24 PROCEDURE — 77065 DX MAMMO INCL CAD UNI: CPT

## 2023-03-24 PROCEDURE — 76642 ULTRASOUND BREAST LIMITED: CPT

## 2023-05-15 ENCOUNTER — APPOINTMENT (OUTPATIENT)
Dept: GENERAL RADIOLOGY | Facility: HOSPITAL | Age: 63
End: 2023-05-15
Payer: MEDICARE

## 2023-05-15 ENCOUNTER — APPOINTMENT (OUTPATIENT)
Dept: CT IMAGING | Facility: HOSPITAL | Age: 63
End: 2023-05-15
Payer: MEDICARE

## 2023-05-15 ENCOUNTER — HOSPITAL ENCOUNTER (EMERGENCY)
Facility: HOSPITAL | Age: 63
Discharge: HOME | End: 2023-05-15
Attending: EMERGENCY MEDICINE | Admitting: EMERGENCY MEDICINE
Payer: MEDICARE

## 2023-05-15 VITALS
OXYGEN SATURATION: 100 % | DIASTOLIC BLOOD PRESSURE: 95 MMHG | WEIGHT: 156.09 LBS | HEIGHT: 66 IN | HEART RATE: 60 BPM | BODY MASS INDEX: 25.09 KG/M2 | TEMPERATURE: 98.5 F | SYSTOLIC BLOOD PRESSURE: 129 MMHG | RESPIRATION RATE: 18 BRPM

## 2023-05-15 DIAGNOSIS — R42 DIZZINESS: ICD-10-CM

## 2023-05-15 DIAGNOSIS — R53.1 GENERALIZED WEAKNESS: Primary | ICD-10-CM

## 2023-05-15 LAB
ALBUMIN SERPL-MCNC: 4.3 G/DL (ref 3.5–5.2)
ALBUMIN/GLOB SERPL: 1.3 G/DL
ALP SERPL-CCNC: 80 U/L (ref 39–117)
ALT SERPL W P-5'-P-CCNC: 14 U/L (ref 1–33)
ANION GAP SERPL CALCULATED.3IONS-SCNC: 10.5 MMOL/L (ref 5–15)
AST SERPL-CCNC: 15 U/L (ref 1–32)
BACTERIA UR QL AUTO: ABNORMAL /HPF
BASOPHILS # BLD AUTO: 0.05 10*3/MM3 (ref 0–0.2)
BASOPHILS NFR BLD AUTO: 0.8 % (ref 0–1.5)
BILIRUB SERPL-MCNC: 0.3 MG/DL (ref 0–1.2)
BILIRUB UR QL STRIP: NEGATIVE
BUN SERPL-MCNC: 13 MG/DL (ref 8–23)
BUN/CREAT SERPL: 14.9 (ref 7–25)
CALCIUM SPEC-SCNC: 9.7 MG/DL (ref 8.6–10.5)
CHLORIDE SERPL-SCNC: 103 MMOL/L (ref 98–107)
CLARITY UR: CLEAR
CO2 SERPL-SCNC: 24.5 MMOL/L (ref 22–29)
COLOR UR: YELLOW
CREAT SERPL-MCNC: 0.87 MG/DL (ref 0.57–1)
DEPRECATED RDW RBC AUTO: 43.9 FL (ref 37–54)
EGFRCR SERPLBLD CKD-EPI 2021: 75.4 ML/MIN/1.73
EOSINOPHIL # BLD AUTO: 0.15 10*3/MM3 (ref 0–0.4)
EOSINOPHIL NFR BLD AUTO: 2.5 % (ref 0.3–6.2)
ERYTHROCYTE [DISTWIDTH] IN BLOOD BY AUTOMATED COUNT: 12.4 % (ref 12.3–15.4)
GLOBULIN UR ELPH-MCNC: 3.3 GM/DL
GLUCOSE SERPL-MCNC: 108 MG/DL (ref 65–99)
GLUCOSE UR STRIP-MCNC: NEGATIVE MG/DL
HCT VFR BLD AUTO: 42.9 % (ref 34–46.6)
HGB BLD-MCNC: 14.4 G/DL (ref 12–15.9)
HGB UR QL STRIP.AUTO: NEGATIVE
HYALINE CASTS UR QL AUTO: ABNORMAL /LPF
IMM GRANULOCYTES # BLD AUTO: 0.02 10*3/MM3 (ref 0–0.05)
IMM GRANULOCYTES NFR BLD AUTO: 0.3 % (ref 0–0.5)
KETONES UR QL STRIP: NEGATIVE
LEUKOCYTE ESTERASE UR QL STRIP.AUTO: ABNORMAL
LYMPHOCYTES # BLD AUTO: 2.06 10*3/MM3 (ref 0.7–3.1)
LYMPHOCYTES NFR BLD AUTO: 34.8 % (ref 19.6–45.3)
MCH RBC QN AUTO: 32.3 PG (ref 26.6–33)
MCHC RBC AUTO-ENTMCNC: 33.6 G/DL (ref 31.5–35.7)
MCV RBC AUTO: 96.2 FL (ref 79–97)
MONOCYTES # BLD AUTO: 0.34 10*3/MM3 (ref 0.1–0.9)
MONOCYTES NFR BLD AUTO: 5.7 % (ref 5–12)
NEUTROPHILS NFR BLD AUTO: 3.3 10*3/MM3 (ref 1.7–7)
NEUTROPHILS NFR BLD AUTO: 55.9 % (ref 42.7–76)
NITRITE UR QL STRIP: NEGATIVE
NRBC BLD AUTO-RTO: 0 /100 WBC (ref 0–0.2)
PH UR STRIP.AUTO: 6 [PH] (ref 5–8)
PLATELET # BLD AUTO: 277 10*3/MM3 (ref 140–450)
PMV BLD AUTO: 9.2 FL (ref 6–12)
POTASSIUM SERPL-SCNC: 4.2 MMOL/L (ref 3.5–5.2)
PROT SERPL-MCNC: 7.6 G/DL (ref 6–8.5)
PROT UR QL STRIP: NEGATIVE
RBC # BLD AUTO: 4.46 10*6/MM3 (ref 3.77–5.28)
RBC # UR STRIP: ABNORMAL /HPF
REF LAB TEST METHOD: ABNORMAL
SODIUM SERPL-SCNC: 138 MMOL/L (ref 136–145)
SP GR UR STRIP: 1.01 (ref 1–1.03)
SQUAMOUS #/AREA URNS HPF: ABNORMAL /HPF
UROBILINOGEN UR QL STRIP: ABNORMAL
WBC # UR STRIP: ABNORMAL /HPF
WBC NRBC COR # BLD: 5.92 10*3/MM3 (ref 3.4–10.8)

## 2023-05-15 PROCEDURE — 70450 CT HEAD/BRAIN W/O DYE: CPT

## 2023-05-15 PROCEDURE — 80053 COMPREHEN METABOLIC PANEL: CPT | Performed by: EMERGENCY MEDICINE

## 2023-05-15 PROCEDURE — 85025 COMPLETE CBC W/AUTO DIFF WBC: CPT | Performed by: EMERGENCY MEDICINE

## 2023-05-15 PROCEDURE — 72100 X-RAY EXAM L-S SPINE 2/3 VWS: CPT

## 2023-05-15 PROCEDURE — 81001 URINALYSIS AUTO W/SCOPE: CPT | Performed by: EMERGENCY MEDICINE

## 2023-05-15 PROCEDURE — 99284 EMERGENCY DEPT VISIT MOD MDM: CPT

## 2023-05-15 PROCEDURE — 36415 COLL VENOUS BLD VENIPUNCTURE: CPT

## 2023-05-15 RX ORDER — HYDROCODONE BITARTRATE AND ACETAMINOPHEN 5; 325 MG/1; MG/1
1 TABLET ORAL ONCE AS NEEDED
Status: COMPLETED | OUTPATIENT
Start: 2023-05-15 | End: 2023-05-15

## 2023-05-15 RX ORDER — HYDROCODONE BITARTRATE AND ACETAMINOPHEN 5; 325 MG/1; MG/1
1 TABLET ORAL EVERY 6 HOURS PRN
Status: DISCONTINUED | OUTPATIENT
Start: 2023-05-15 | End: 2023-05-15

## 2023-05-15 RX ORDER — MECLIZINE HYDROCHLORIDE 25 MG/1
25 TABLET ORAL ONCE
Status: COMPLETED | OUTPATIENT
Start: 2023-05-15 | End: 2023-05-15

## 2023-05-15 RX ADMIN — HYDROCODONE BITARTRATE AND ACETAMINOPHEN 1 TABLET: 5; 325 TABLET ORAL at 14:29

## 2023-05-15 RX ADMIN — MECLIZINE HYDROCHLORIDE 25 MG: 25 TABLET ORAL at 14:29

## 2023-05-15 NOTE — DISCHARGE INSTRUCTIONS
Stay well-hydrated.  Follow-up your family doctor tomorrow.  Return to the ER immediately for worsening of symptoms.
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
## 2023-05-17 ENCOUNTER — OFFICE VISIT (OUTPATIENT)
Dept: GASTROENTEROLOGY | Facility: CLINIC | Age: 63
End: 2023-05-17
Payer: MEDICARE

## 2023-05-17 VITALS
HEIGHT: 66 IN | DIASTOLIC BLOOD PRESSURE: 77 MMHG | BODY MASS INDEX: 22.98 KG/M2 | SYSTOLIC BLOOD PRESSURE: 108 MMHG | HEART RATE: 67 BPM | WEIGHT: 143 LBS

## 2023-05-17 DIAGNOSIS — R12 HEARTBURN: ICD-10-CM

## 2023-05-17 DIAGNOSIS — K59.04 CHRONIC IDIOPATHIC CONSTIPATION: Primary | ICD-10-CM

## 2023-05-17 DIAGNOSIS — K59.09 CHRONIC CONSTIPATION: ICD-10-CM

## 2023-05-17 DIAGNOSIS — R13.19 ESOPHAGEAL DYSPHAGIA: ICD-10-CM

## 2023-05-17 RX ORDER — OMEPRAZOLE 20 MG/1
20 CAPSULE, DELAYED RELEASE ORAL DAILY
Qty: 90 CAPSULE | Refills: 1 | Status: SHIPPED | OUTPATIENT
Start: 2023-05-17 | End: 2023-08-15

## 2023-05-17 RX ORDER — PEG-3350, SODIUM SULFATE, SODIUM CHLORIDE, POTASSIUM CHLORIDE, SODIUM ASCORBATE AND ASCORBIC ACID 7.5-2.691G
1000 KIT ORAL EVERY 12 HOURS
Qty: 2000 ML | Refills: 0 | Status: SHIPPED | OUTPATIENT
Start: 2023-05-17

## 2023-05-17 NOTE — PROGRESS NOTES
Chief Complaint     Diverticulosis    History of Present Illness     Ann Perla is a 62 y.o. female who presents to [facility] on referral from LAURENCE Aldrich for a gastroenterology evaluation of history of diverticulosis.      She reports that her Mother carries the gene for colon cancer.  Patient has several aunts and uncles with cancer.      Admits constipation.  She is taking gummy fiber daily.  If she doesn't have a bowel movement, will take this every other day.      Reports a history of hiatal hernia repair about three years ago.  States that she's experiencing heartburn periodically.  States that this is present at least once per week.  Reports dysphagia when eating breads or french fries since surgery.       History      Past Medical History:   Diagnosis Date   • Abdominal pain, LLQ 12/04/2015   • Back pain    • Bunion    • Depressive disorder    • Foot pain, right 04/08/2021   • GERD (gastroesophageal reflux disease)    • Hallux valgus of right foot 12/03/2018   • Hammer toe    • Herpes genitalia    • HLD (hyperlipidemia)    • Migraine    • Mixed incontinence urge and stress    • OAB (overactive bladder) 01/24/2018   • Urinary urgency        Past Surgical History:   Procedure Laterality Date   • CERVICAL FUSION     • COLONOSCOPY  2018, 2016   • CYSTOSCOPY     • CYSTOSCOPY RETROGRADE PYELOGRAM     • ENDOSCOPY  2018   • FOOT SURGERY     • HAND SURGERY Left    • HAND SURGERY Right    • HIATAL HERNIA REPAIR  05/10/2019   • NECK SURGERY     • TUBAL ABDOMINAL LIGATION     • VEIN SURGERY         Family History   Problem Relation Age of Onset   • Stroke Mother    • Hypertension Mother    • Heart disease Brother    • Kidney nephrosis Brother    • Hypertension Maternal Grandmother    • Diabetes Maternal Grandmother    • Cancer Maternal Grandfather         Unspecified   • Cancer Other         Unspecified   • Cancer Other         Unspecified   • Lung cancer Other    •  "Colon cancer Neg Hx         Current Medications        Current Outpatient Medications:   •  fluconazole (DIFLUCAN) 150 MG tablet, , Disp: , Rfl:   •  fluorometholone (FML) 0.1 % ophthalmic suspension, , Disp: , Rfl:   •  Linzess 72 MCG capsule capsule, 1 capsule Every Morning Before Breakfast., Disp: , Rfl:   •  polyethylene glycol (MIRALAX) 17 GM/SCOOP powder, , Disp: , Rfl:   •  PreviDent 5000 Plus 1.1 % cream, As Needed., Disp: , Rfl:   •  propranolol LA (INDERAL LA) 60 MG 24 hr capsule, propranolol ER 60 mg capsule,24 hr,extended release, Disp: , Rfl:   •  QUEtiapine (SEROquel) 200 MG tablet, Take 1 tablet by mouth., Disp: , Rfl:   •  simethicone (MYLICON) 80 MG chewable tablet, , Disp: , Rfl:   •  topiramate (TOPAMAX) 100 MG tablet, 1 tablet Daily., Disp: , Rfl:   •  tretinoin (RETIN-A) 0.025 % cream, , Disp: , Rfl:   •  valACYclovir (VALTREX) 500 MG tablet, valacyclovir 500 mg tablet, Disp: , Rfl:   •  vilazodone (VIIBRYD) 40 MG tablet tablet, Viibryd 40 mg tablet, Disp: , Rfl:   •  Xiidra 5 % ophthalmic solution, 1 (One) Time., Disp: , Rfl:   •  omeprazole (priLOSEC) 20 MG capsule, Take 1 capsule by mouth Daily for 90 days., Disp: 90 capsule, Rfl: 1  •  PEG-KCl-NaCl-NaSulf-Na Asc-C (MoviPrep) 100 g reconstituted solution powder, Take 1,000 mL by mouth Every 12 (Twelve) Hours., Disp: 2000 mL, Rfl: 0     Allergies     No Known Allergies    Social History       Social History     Social History Narrative   • Not on file       Immunizations     Immunization:  Immunization History   Administered Date(s) Administered   • COVID-19 (PFIZER) Purple Cap Monovalent 03/09/2021, 03/30/2021          Objective     Objective     Vital Signs:   /77 (BP Location: Left arm, Patient Position: Sitting, Cuff Size: Adult)   Pulse 67   Ht 167.6 cm (66\")   Wt 64.9 kg (143 lb)   BMI 23.08 kg/m²       Physical Exam    Results      Result Review :   The following data was reviewed by: LAURENCE Li on " 05/17/2023:    EGD/colonoscopy (1/17/2018): Normal esophagus, tortuous distal esophagus, 4 cm hiatal hernia, erythema in the gastric antrum and body, normal duodenum. Mild sigmoid diverticulosis.  Normal terminal ileum, grade 1 internal hemorrhoids. Normal distal esophagus biopsies. Normal gastric biopsies with no H. pylori.  CBC w/diff        5/15/2023    15:34   CBC w/Diff   WBC 5.92     RBC 4.46     Hemoglobin 14.4     Hematocrit 42.9     MCV 96.2     MCH 32.3     MCHC 33.6     RDW 12.4     Platelets 277     Neutrophil Rel % 55.9     Immature Granulocyte Rel % 0.3     Lymphocyte Rel % 34.8     Monocyte Rel % 5.7     Eosinophil Rel % 2.5     Basophil Rel % 0.8       CMP        5/15/2023    15:34   CMP   Glucose 108     BUN 13     Creatinine 0.87     EGFR 75.4     Sodium 138     Potassium 4.2     Chloride 103     Calcium 9.7     Total Protein 7.6     Albumin 4.3     Globulin 3.3     Total Bilirubin 0.3     Alkaline Phosphatase 80     AST (SGOT) 15     ALT (SGPT) 14     Albumin/Globulin Ratio 1.3     BUN/Creatinine Ratio 14.9     Anion Gap 10.5        3/24/2022 gastric emptying study-Mild delay in gastric emptying 4 hours postingestion.            Assessment and Plan        Assessment and Plan    Diagnoses and all orders for this visit:    1. Chronic idiopathic constipation (Primary)  -     Case Request; Standing  -     Case Request    2. Heartburn  -     omeprazole (priLOSEC) 20 MG capsule; Take 1 capsule by mouth Daily for 90 days.  Dispense: 90 capsule; Refill: 1  -     Case Request; Standing  -     Case Request    3. Esophageal dysphagia  -     Case Request; Standing  -     Case Request    4. Chronic constipation    Other orders  -     Follow Anesthesia Guidelines / Protocol; Future  -     Verify NPO; Standing  -     Verify Bowel Prep Was Successful; Standing  -     Give Tap Water Enema If Bowel Prep Insufficient; Standing  -     PEG-KCl-NaCl-NaSulf-Na Asc-C (MoviPrep) 100 g reconstituted solution powder; Take  1,000 mL by mouth Every 12 (Twelve) Hours.  Dispense: 2000 mL; Refill: 0        ESOPHAGOGASTRODUODENOSCOPY (N/A), COLONOSCOPY (N/A)  The risk of the endoscopy were discussed in detail. Possible risks/complications, benefits, and alternatives to surgical or invasive procedure have been explained to patient and/or legal guardian; risks include bleeding, infection, and perforation. Patient has been evaluated and can tolerate anesthesia and/or sedation.       Follow Up        Follow Up   Return for F/U after procedure.  Patient was given instructions and counseling regarding her condition or for health maintenance advice. Please see specific information pulled into the AVS if appropriate.

## 2023-05-19 ENCOUNTER — PATIENT ROUNDING (BHMG ONLY) (OUTPATIENT)
Dept: GASTROENTEROLOGY | Facility: CLINIC | Age: 63
End: 2023-05-19
Payer: MEDICARE

## 2023-05-19 NOTE — PROGRESS NOTES
5/19/2023      Hello, may I speak with Ann Perla     My name is Rosa Maria, I am the Clinical Coordinator. I am calling from [facility]. I show that you had a recent visit with LAURENCE Swift.    Before we get started may I verify your date of birth? 1960    I am calling to officially welcome you to our practice and ask about your recent visit. Is this a good time to talk? Yes     Tell me about your visit with us. What things went well?   Patient states that the visit went great.   She enjoyed talking to the provider.     We're always looking for ways to make our patients' experiences even better. Do you have recommendations on ways we may improve? No     Overall were you satisfied with your first visit to our practice? Yes     I appreciate you taking the time to speak with me today. Is there anything else I can do for you? No     I am glad to hear that you had a very good visit and I appreciate you taking the time to provide feedback on this call. We would greatly appreciate you filling out a survey if you receive one in the mail, email or text. This is a great opportunity to provide any additional feedback that you may think of after this call as well.       Thank you, and have a great day.

## 2023-07-07 PROBLEM — M54.81 BILATERAL OCCIPITAL NEURALGIA: Status: ACTIVE | Noted: 2023-07-07

## 2023-07-07 PROBLEM — M54.2 NECK PAIN: Status: ACTIVE | Noted: 2023-07-07

## 2023-07-25 RX ORDER — PEG-3350, SODIUM SULFATE, SODIUM CHLORIDE, POTASSIUM CHLORIDE, SODIUM ASCORBATE AND ASCORBIC ACID 7.5-2.691G
1000 KIT ORAL EVERY 12 HOURS
Qty: 1 EACH | Refills: 0 | Status: SHIPPED | OUTPATIENT
Start: 2023-07-25

## 2023-07-25 NOTE — TELEPHONE ENCOUNTER
Caller: Ann Perla    Relationship to patient: Self    Best call back number: [phone]     Patient is needing: PT CALLING TO FOLLOW UP W QUESTIONS ABOUT RX FOR COLONOSCOPY PREP. LAST TE ON SAYS 7/13/23 SAYS THAT RX WAS SENT TO PHARMACY, BUT IT DOES NOT APPEAR IN PTS CHART. PLEASE CALL BACK FOR CLARIFICATION.

## 2023-08-14 ENCOUNTER — TELEPHONE (OUTPATIENT)
Dept: GASTROENTEROLOGY | Facility: CLINIC | Age: 63
End: 2023-08-14
Payer: MEDICARE

## 2023-08-16 ENCOUNTER — OFFICE VISIT (OUTPATIENT)
Dept: UROLOGY | Facility: CLINIC | Age: 63
End: 2023-08-16
Payer: MEDICARE

## 2023-08-16 VITALS
TEMPERATURE: 97.8 F | HEART RATE: 62 BPM | SYSTOLIC BLOOD PRESSURE: 110 MMHG | HEIGHT: 66 IN | WEIGHT: 143 LBS | BODY MASS INDEX: 22.98 KG/M2 | DIASTOLIC BLOOD PRESSURE: 75 MMHG

## 2023-08-16 DIAGNOSIS — N32.81 OAB (OVERACTIVE BLADDER): Primary | ICD-10-CM

## 2023-08-16 LAB
BILIRUB BLD-MCNC: NEGATIVE MG/DL
CLARITY, POC: CLEAR
COLOR UR: YELLOW
GLUCOSE UR STRIP-MCNC: NEGATIVE MG/DL
KETONES UR QL: NEGATIVE
LEUKOCYTE EST, POC: NEGATIVE
NITRITE UR-MCNC: NEGATIVE MG/ML
PH UR: 6.5 [PH] (ref 5–8)
PROT UR STRIP-MCNC: NEGATIVE MG/DL
RBC # UR STRIP: NEGATIVE /UL
SP GR UR: 1.01 (ref 1–1.03)
UROBILINOGEN UR QL: NORMAL

## 2023-08-16 PROCEDURE — 81002 URINALYSIS NONAUTO W/O SCOPE: CPT | Performed by: UROLOGY

## 2023-08-16 PROCEDURE — 1159F MED LIST DOCD IN RCRD: CPT | Performed by: UROLOGY

## 2023-08-16 PROCEDURE — 99212 OFFICE O/P EST SF 10 MIN: CPT | Performed by: UROLOGY

## 2023-08-16 PROCEDURE — 3078F DIAST BP <80 MM HG: CPT | Performed by: UROLOGY

## 2023-08-16 PROCEDURE — 3074F SYST BP LT 130 MM HG: CPT | Performed by: UROLOGY

## 2023-08-16 PROCEDURE — 1160F RVW MEDS BY RX/DR IN RCRD: CPT | Performed by: UROLOGY

## 2023-08-16 RX ORDER — FLUTICASONE PROPIONATE 50 MCG
SPRAY, SUSPENSION (ML) NASAL
COMMUNITY
Start: 2023-08-11

## 2023-08-16 RX ORDER — LEVOTHYROXINE SODIUM 50 MCG
TABLET ORAL
COMMUNITY

## 2023-08-16 RX ORDER — VIBEGRON 75 MG/1
75 TABLET, FILM COATED ORAL DAILY
Qty: 90 TABLET | Refills: 1 | Status: SHIPPED | OUTPATIENT
Start: 2023-08-16 | End: 2024-02-12

## 2023-08-16 RX ORDER — VIBEGRON 75 MG/1
TABLET, FILM COATED ORAL
COMMUNITY

## 2023-08-16 RX ORDER — SIMVASTATIN 20 MG
TABLET ORAL
COMMUNITY
Start: 2023-08-11 | End: 2023-08-16

## 2023-08-16 RX ORDER — PANTOPRAZOLE SODIUM 20 MG/1
TABLET, DELAYED RELEASE ORAL
COMMUNITY
Start: 2023-08-11

## 2023-08-18 ENCOUNTER — HOSPITAL ENCOUNTER (OUTPATIENT)
Dept: MRI IMAGING | Facility: HOSPITAL | Age: 63
Discharge: HOME | End: 2023-08-18
Admitting: NURSE PRACTITIONER
Payer: MEDICARE

## 2023-08-18 DIAGNOSIS — M54.2 NECK PAIN: ICD-10-CM

## 2023-08-18 DIAGNOSIS — Z98.1 S/P CERVICAL SPINAL FUSION: ICD-10-CM

## 2023-08-18 DIAGNOSIS — M54.81 BILATERAL OCCIPITAL NEURALGIA: ICD-10-CM

## 2023-08-18 LAB
CREAT BLDA-MCNC: 0.8 MG/DL
EGFRCR SERPLBLD CKD-EPI 2021: 83.4 ML/MIN/1.73

## 2023-08-18 PROCEDURE — 0 GADOBENATE DIMEGLUMINE 529 MG/ML SOLUTION: Performed by: NURSE PRACTITIONER

## 2023-08-18 PROCEDURE — 82565 ASSAY OF CREATININE: CPT

## 2023-08-18 PROCEDURE — A9577 INJ MULTIHANCE: HCPCS | Performed by: NURSE PRACTITIONER

## 2023-08-18 PROCEDURE — 72156 MRI NECK SPINE W/O & W/DYE: CPT

## 2023-08-18 RX ADMIN — GADOBENATE DIMEGLUMINE 14 ML: 529 INJECTION, SOLUTION INTRAVENOUS at 13:38

## 2023-08-23 ENCOUNTER — TELEPHONE (OUTPATIENT)
Dept: GASTROENTEROLOGY | Facility: CLINIC | Age: 63
End: 2023-08-23
Payer: MEDICARE

## 2023-08-23 NOTE — TELEPHONE ENCOUNTER
Attempted to contact pt to remind them of their upcoming procedure on 8/28/2023. Left voicemail for pt.

## 2023-08-27 ENCOUNTER — ANESTHESIA EVENT (OUTPATIENT)
Dept: GASTROENTEROLOGY | Facility: HOSPITAL | Age: 63
End: 2023-08-27
Payer: MEDICARE

## 2023-08-27 NOTE — ANESTHESIA PREPROCEDURE EVALUATION
Anesthesia Evaluation     Patient summary reviewed and Nursing notes reviewed   NPO Solid Status: > 8 hours  NPO Liquid Status: > 2 hours           Airway   Mallampati: II  Neck ROM: limited  No difficulty expected  Dental - normal exam     Pulmonary     breath sounds clear to auscultation  Cardiovascular   Exercise tolerance: good (4-7 METS)    Patient on routine beta blocker  Rhythm: regular  Rate: normal    (+) hypertension, hyperlipidemia      Neuro/Psych  (+) headaches, psychiatric history  GI/Hepatic/Renal/Endo    (+) GERD, renal disease    Musculoskeletal     (+) back pain, neck pain (cervical fusion x2; limited ROM; pain associated with neck flexion)  Abdominal    Substance History      OB/GYN          Other   arthritis,     ROS/Med Hx Other: C3-7 Fusion                Anesthesia Plan    ASA 3     general   total IV anesthesia  intravenous induction     Anesthetic plan, risks, benefits, and alternatives have been provided, discussed and informed consent has been obtained with: patient.    Plan discussed with CRNA.    CODE STATUS:

## 2023-08-28 ENCOUNTER — HOSPITAL ENCOUNTER (OUTPATIENT)
Facility: HOSPITAL | Age: 63
Setting detail: HOSPITAL OUTPATIENT SURGERY
Discharge: HOME | End: 2023-08-28
Attending: INTERNAL MEDICINE | Admitting: INTERNAL MEDICINE
Payer: MEDICARE

## 2023-08-28 ENCOUNTER — ANESTHESIA (OUTPATIENT)
Dept: GASTROENTEROLOGY | Facility: HOSPITAL | Age: 63
End: 2023-08-28
Payer: MEDICARE

## 2023-08-28 VITALS
RESPIRATION RATE: 15 BRPM | TEMPERATURE: 98.7 F | HEART RATE: 55 BPM | OXYGEN SATURATION: 98 % | HEIGHT: 66 IN | DIASTOLIC BLOOD PRESSURE: 78 MMHG | BODY MASS INDEX: 23.31 KG/M2 | SYSTOLIC BLOOD PRESSURE: 103 MMHG | WEIGHT: 145.06 LBS

## 2023-08-28 DIAGNOSIS — R13.19 ESOPHAGEAL DYSPHAGIA: ICD-10-CM

## 2023-08-28 DIAGNOSIS — R12 HEARTBURN: ICD-10-CM

## 2023-08-28 DIAGNOSIS — K59.04 CHRONIC IDIOPATHIC CONSTIPATION: ICD-10-CM

## 2023-08-28 PROCEDURE — C1726 CATH, BAL DIL, NON-VASCULAR: HCPCS | Performed by: INTERNAL MEDICINE

## 2023-08-28 PROCEDURE — 25010000002 PROPOFOL 10 MG/ML EMULSION: Performed by: NURSE ANESTHETIST, CERTIFIED REGISTERED

## 2023-08-28 PROCEDURE — 88305 TISSUE EXAM BY PATHOLOGIST: CPT | Performed by: INTERNAL MEDICINE

## 2023-08-28 DEVICE — DEV CLIP ENDO RESOLUTION360 CONTRL ROT 235CM: Type: IMPLANTABLE DEVICE | Site: ASCENDING COLON | Status: FUNCTIONAL

## 2023-08-28 RX ORDER — SODIUM CHLORIDE, SODIUM LACTATE, POTASSIUM CHLORIDE, CALCIUM CHLORIDE 600; 310; 30; 20 MG/100ML; MG/100ML; MG/100ML; MG/100ML
30 INJECTION, SOLUTION INTRAVENOUS CONTINUOUS
Status: DISCONTINUED | OUTPATIENT
Start: 2023-08-28 | End: 2023-08-28 | Stop reason: HOSPADM

## 2023-08-28 RX ORDER — PROPOFOL 10 MG/ML
VIAL (ML) INTRAVENOUS AS NEEDED
Status: DISCONTINUED | OUTPATIENT
Start: 2023-08-28 | End: 2023-08-28 | Stop reason: SURG

## 2023-08-28 RX ORDER — LIDOCAINE HYDROCHLORIDE 20 MG/ML
INJECTION, SOLUTION EPIDURAL; INFILTRATION; INTRACAUDAL; PERINEURAL AS NEEDED
Status: DISCONTINUED | OUTPATIENT
Start: 2023-08-28 | End: 2023-08-28 | Stop reason: SURG

## 2023-08-28 RX ORDER — PHENYLEPHRINE HCL IN 0.9% NACL 1 MG/10 ML
SYRINGE (ML) INTRAVENOUS AS NEEDED
Status: DISCONTINUED | OUTPATIENT
Start: 2023-08-28 | End: 2023-08-28 | Stop reason: SURG

## 2023-08-28 RX ADMIN — SODIUM CHLORIDE, POTASSIUM CHLORIDE, SODIUM LACTATE AND CALCIUM CHLORIDE 30 ML/HR: 600; 310; 30; 20 INJECTION, SOLUTION INTRAVENOUS at 07:47

## 2023-08-28 RX ADMIN — PROPOFOL 50 MG: 10 INJECTION, EMULSION INTRAVENOUS at 08:33

## 2023-08-28 RX ADMIN — LIDOCAINE HYDROCHLORIDE 50 MG: 20 INJECTION, SOLUTION EPIDURAL; INFILTRATION; INTRACAUDAL; PERINEURAL at 08:33

## 2023-08-28 RX ADMIN — PROPOFOL 175 MCG/KG/MIN: 10 INJECTION, EMULSION INTRAVENOUS at 08:33

## 2023-08-28 RX ADMIN — Medication 200 MCG: at 08:42

## 2023-08-28 RX ADMIN — Medication 200 MCG: at 08:59

## 2023-08-28 NOTE — H&P
Pre Procedure History & Physical    Chief Complaint:   Dysphagia, constipation    Subjective     HPI:   61 yo F here for eval of dysphagia, constipation.    Past Medical History:   Past Medical History:   Diagnosis Date    Abdominal pain, LLQ 12/04/2015    Back pain     Bunion     Depressive disorder     Foot pain, right 04/08/2021    GERD (gastroesophageal reflux disease)     Hallux valgus of right foot 12/03/2018    Hammer toe     Herpes genitalia     HLD (hyperlipidemia)     Migraine     Mixed incontinence urge and stress     OAB (overactive bladder) 01/24/2018    Urinary urgency        Past Surgical History:  Past Surgical History:   Procedure Laterality Date    CERVICAL FUSION      COLONOSCOPY  2018, 2016    CYSTOSCOPY      CYSTOSCOPY RETROGRADE PYELOGRAM      ENDOSCOPY  2018    FOOT SURGERY      HAND SURGERY Left     HAND SURGERY Right     HIATAL HERNIA REPAIR  05/10/2019    NECK SURGERY      TUBAL ABDOMINAL LIGATION      VEIN SURGERY         Family History:  Family History   Problem Relation Age of Onset    Stroke Mother     Hypertension Mother     Heart disease Brother     Kidney nephrosis Brother     Hypertension Maternal Grandmother     Diabetes Maternal Grandmother     Cancer Maternal Grandfather         Unspecified    Cancer Other         Unspecified    Cancer Other         Unspecified    Lung cancer Other     Colon cancer Neg Hx        Social History:   reports that she has never smoked. She has never used smokeless tobacco. She reports that she does not currently use alcohol. She reports that she does not use drugs.    Medications:   Medications Prior to Admission   Medication Sig Dispense Refill Last Dose    fluorometholone (FML) 0.1 % ophthalmic suspension    Past Week    fluticasone (FLONASE) 50 MCG/ACT nasal spray    Past Week    Gemtesa 75 MG tablet    Past Week    Linzess 72 MCG capsule capsule 1 capsule Every Morning Before Breakfast.   Past Week    OXcarbazepine (TRILEPTAL) 150 MG tablet Take 1  "tablet by mouth 2 (Two) Times a Day. 1 tablet nightly for 2 weeks, then increase to twice daily 180 tablet 1 Past Week    pantoprazole (PROTONIX) 20 MG EC tablet    Past Week    polyethylene glycol (MIRALAX) 17 GM/SCOOP powder    Past Week    PreviDent 5000 Plus 1.1 % cream As Needed.   Past Week    propranolol LA (INDERAL LA) 60 MG 24 hr capsule propranolol ER 60 mg capsule,24 hr,extended release   Past Week    QUEtiapine (SEROquel) 200 MG tablet Take 1 tablet by mouth.   Past Week    simethicone (MYLICON) 80 MG chewable tablet    Past Week    Synthroid 50 MCG tablet    Past Week    topiramate (TOPAMAX) 100 MG tablet 1 tablet Daily.   Past Week    tretinoin (RETIN-A) 0.025 % cream    Past Week    valACYclovir (VALTREX) 500 MG tablet valacyclovir 500 mg tablet   Past Week    Vibegron (Gemtesa) 75 MG tablet Take 1 tablet by mouth Daily for 180 days. 90 tablet 1 Past Week    vilazodone (VIIBRYD) 40 MG tablet tablet Viibryd 40 mg tablet   Past Week    vitamin D (ERGOCALCIFEROL) 1.25 MG (81120 UT) capsule capsule    Past Week    Xiidra 5 % ophthalmic solution 1 (One) Time.   Past Week       Allergies:  Patient has no known allergies.    ROS:    Pertinent items are noted in HPI     Objective     Blood pressure 104/76, pulse 55, temperature 97.4 øF (36.3 øC), temperature source Temporal, resp. rate 16, height 167.6 cm (66\"), weight 65.8 kg (145 lb 1 oz), SpO2 97 %.    Physical Exam   Constitutional: Pt is oriented to person, place, and time and well-developed, well-nourished, and in no distress.   Mouth/Throat: Oropharynx is clear and moist.   Neck: Normal range of motion.   Cardiovascular: Normal rate, regular rhythm and normal heart sounds.    Pulmonary/Chest: Effort normal and breath sounds normal.   Abdominal: Soft. Nontender  Skin: Skin is warm and dry.   Psychiatric: Mood, memory, affect and judgment normal.     Assessment & Plan     Diagnosis:  Dysphagia, constipation    Anticipated Surgical " Procedure:  EGD/colonoscopy    The risks, benefits, and alternatives of this procedure have been discussed with the patient or the responsible party- the patient understands and agrees to proceed.

## 2023-08-28 NOTE — ANESTHESIA POSTPROCEDURE EVALUATION
Patient: Ann Perla    Procedure Summary       Date: 08/28/23 Room / Location: [facility] 4 / [facility]    Anesthesia Start: 0831 Anesthesia Stop: 0915    Procedures:       ESOPHAGOGASTRODUODENOSCOPY WITH BIOPSIES AND ESOPHAGEAL DILATION TO 18 MM      COLONOSCOPY WITH POLYPECTOMIES/HOT SNARE WITH ELEVIEW INJECTION AND CLIP APPLICATION X 2 Diagnosis:       Chronic idiopathic constipation      Heartburn      Esophageal dysphagia      (Chronic idiopathic constipation [K59.04])      (Heartburn [R12])      (Esophageal dysphagia [R13.19])    Surgeons: Jeanette Mcmahon MD Provider: Alberto Win CRNA    Anesthesia Type: general ASA Status: 3            Anesthesia Type: general    Vitals  Vitals Value Taken Time   /78 08/28/23 0925   Temp 37.1 øC (98.7 øF) 08/28/23 0925   Pulse 55 08/28/23 0926   Resp 15 08/28/23 0925   SpO2 98 % 08/28/23 0926   Vitals shown include unvalidated device data.        Post Anesthesia Care and Evaluation    Patient location during evaluation: bedside  Patient participation: complete - patient participated  Level of consciousness: awake  Pain management: adequate    Airway patency: patent  Anesthetic complications: No anesthetic complications  PONV Status: controlled  Cardiovascular status: acceptable and stable  Respiratory status: acceptable  Hydration status: acceptable     2

## 2023-08-29 ENCOUNTER — TELEPHONE (OUTPATIENT)
Dept: NEUROLOGY | Facility: CLINIC | Age: 63
End: 2023-08-29
Payer: MEDICARE

## 2023-08-29 ENCOUNTER — TRANSCRIBE ORDERS (OUTPATIENT)
Dept: ADMINISTRATIVE | Facility: HOSPITAL | Age: 63
End: 2023-08-29
Payer: MEDICARE

## 2023-08-29 DIAGNOSIS — M81.0 AGE RELATED OSTEOPOROSIS, UNSPECIFIED PATHOLOGICAL FRACTURE PRESENCE: Primary | ICD-10-CM

## 2023-08-29 LAB
CYTO UR: NORMAL
LAB AP CASE REPORT: NORMAL
LAB AP CLINICAL INFORMATION: NORMAL
PATH REPORT.FINAL DX SPEC: NORMAL
PATH REPORT.GROSS SPEC: NORMAL

## 2023-08-29 NOTE — TELEPHONE ENCOUNTER
Patient called, wanting to get the results of her MRI.     Please call patient back about these.     [phone] best call back.             Pt states that she's called multiple times but doesn't get an answer. She states she's pushed all the extensions and it just rings.

## 2023-08-30 ENCOUNTER — TELEPHONE (OUTPATIENT)
Dept: GASTROENTEROLOGY | Facility: CLINIC | Age: 63
End: 2023-08-30
Payer: MEDICARE

## 2023-08-30 PROBLEM — M85.89 OSTEOPENIA OF MULTIPLE SITES: Status: ACTIVE | Noted: 2023-08-30

## 2023-08-30 PROBLEM — Z78.0 MENOPAUSE: Status: ACTIVE | Noted: 2023-08-30

## 2023-08-30 RX ORDER — PANTOPRAZOLE SODIUM 20 MG/1
20 TABLET, DELAYED RELEASE ORAL DAILY
Qty: 90 TABLET | Refills: 1 | Status: SHIPPED | OUTPATIENT
Start: 2023-08-30

## 2023-08-31 NOTE — TELEPHONE ENCOUNTER
----- Message from LAURENCE Li sent at 8/29/2023 12:02 PM EDT -----  Biopsies are consistent with reflux esophagitis.  Continue current PPI therapy and schedule for follow-up.  Colon polyps benign.  Recommend repeat in 3 years.  Place in recall.    
Patient notified of results and scheduled for a follow up.  She said that she is not currently on a PPI, I told her that it looked like pantoprazole was prescribed but she said she never got it, she would like this to be sent to [facility].  
Patient notified.  
Rx sent.   
Report given to AYESHA Varghese. Pt remains on comfort care per family. Pt on NRB at 15L O2, MD aware of BP, pt receiving continuous fluids. Per family and MD no treatments.

## 2023-09-08 ENCOUNTER — HOSPITAL ENCOUNTER (OUTPATIENT)
Dept: INFUSION THERAPY | Facility: HOSPITAL | Age: 63
Discharge: HOME | End: 2023-09-08
Payer: MEDICARE

## 2023-09-08 VITALS
RESPIRATION RATE: 20 BRPM | DIASTOLIC BLOOD PRESSURE: 71 MMHG | BODY MASS INDEX: 22.68 KG/M2 | TEMPERATURE: 98 F | SYSTOLIC BLOOD PRESSURE: 113 MMHG | WEIGHT: 141.09 LBS | HEART RATE: 62 BPM | HEIGHT: 66 IN | OXYGEN SATURATION: 97 %

## 2023-09-08 DIAGNOSIS — M85.89 OSTEOPENIA OF MULTIPLE SITES: Primary | ICD-10-CM

## 2023-09-08 DIAGNOSIS — Z78.0 MENOPAUSE: ICD-10-CM

## 2023-09-08 DIAGNOSIS — M81.0 AGE-RELATED OSTEOPOROSIS WITHOUT CURRENT PATHOLOGICAL FRACTURE: ICD-10-CM

## 2023-09-08 LAB
ALBUMIN SERPL-MCNC: 4.4 G/DL (ref 3.5–5.2)
ALBUMIN/GLOB SERPL: 1.6 G/DL
ALP SERPL-CCNC: 107 U/L (ref 39–117)
ALT SERPL W P-5'-P-CCNC: 20 U/L (ref 1–33)
ANION GAP SERPL CALCULATED.3IONS-SCNC: 8 MMOL/L (ref 5–15)
AST SERPL-CCNC: 15 U/L (ref 1–32)
BILIRUB SERPL-MCNC: 0.3 MG/DL (ref 0–1.2)
BUN SERPL-MCNC: 12 MG/DL (ref 8–23)
BUN/CREAT SERPL: 13.2 (ref 7–25)
CALCIUM SPEC-SCNC: 9.9 MG/DL (ref 8.6–10.5)
CHLORIDE SERPL-SCNC: 107 MMOL/L (ref 98–107)
CO2 SERPL-SCNC: 27 MMOL/L (ref 22–29)
CREAT SERPL-MCNC: 0.91 MG/DL (ref 0.57–1)
EGFRCR SERPLBLD CKD-EPI 2021: 71.5 ML/MIN/1.73
GLOBULIN UR ELPH-MCNC: 2.8 GM/DL
GLUCOSE SERPL-MCNC: 109 MG/DL (ref 65–99)
POTASSIUM SERPL-SCNC: 4.2 MMOL/L (ref 3.5–5.2)
PROT SERPL-MCNC: 7.2 G/DL (ref 6–8.5)
SODIUM SERPL-SCNC: 142 MMOL/L (ref 136–145)

## 2023-09-08 PROCEDURE — 80053 COMPREHEN METABOLIC PANEL: CPT | Performed by: NURSE PRACTITIONER

## 2023-09-08 PROCEDURE — 25010000002 DENOSUMAB 60 MG/ML SOLUTION PREFILLED SYRINGE: Performed by: NURSE PRACTITIONER

## 2023-09-08 PROCEDURE — 96372 THER/PROPH/DIAG INJ SC/IM: CPT

## 2023-09-08 PROCEDURE — 36415 COLL VENOUS BLD VENIPUNCTURE: CPT

## 2023-09-08 RX ADMIN — DENOSUMAB 60 MG: 60 INJECTION SUBCUTANEOUS at 15:28

## 2023-11-13 ENCOUNTER — OFFICE VISIT (OUTPATIENT)
Dept: NEUROLOGY | Facility: CLINIC | Age: 63
End: 2023-11-13
Payer: MEDICARE

## 2023-11-13 ENCOUNTER — PRIOR AUTHORIZATION (OUTPATIENT)
Dept: NEUROLOGY | Facility: CLINIC | Age: 63
End: 2023-11-13

## 2023-11-13 VITALS
HEIGHT: 66 IN | HEART RATE: 71 BPM | SYSTOLIC BLOOD PRESSURE: 102 MMHG | WEIGHT: 149.7 LBS | DIASTOLIC BLOOD PRESSURE: 75 MMHG | BODY MASS INDEX: 24.06 KG/M2

## 2023-11-13 DIAGNOSIS — M54.81 BILATERAL OCCIPITAL NEURALGIA: Primary | ICD-10-CM

## 2023-11-13 DIAGNOSIS — M54.2 CERVICALGIA: ICD-10-CM

## 2023-11-13 RX ORDER — AMOXICILLIN AND CLAVULANATE POTASSIUM 875; 125 MG/1; MG/1
1 TABLET, FILM COATED ORAL EVERY 12 HOURS SCHEDULED
COMMUNITY
Start: 2023-11-07

## 2023-11-13 NOTE — PROGRESS NOTES
"Chief Complaint  Neurologic Problem    Subjective          Ann Perla presents to [facility]  History of Present Illness  Following up to discuss C Spine MRI.  Continues to have pain in occipital region and in the neck.  Did not feel she had any improvement with oxcarbazepine, so she discontinued it.     Interval History:   States that she's experiencing neck pain to bilateral occipital regions into the neck.  Worse with certain positions, turning the head.  Pain began about 6 months ago.  History of cervical fusion, two separate surgeries, she thinks about 5 years ago.        Objective   Vital Signs:   /75   Pulse 71   Ht 167.6 cm (66\")   Wt 67.9 kg (149 lb 11.2 oz)   BMI 24.16 kg/m²     Physical Exam  HENT:      Head: Normocephalic.   Neck:      Comments: Tenderness over bilateral occipital regions   Pulmonary:      Effort: Pulmonary effort is normal.   Musculoskeletal:      Cervical back: Muscular tenderness present. Decreased range of motion.   Neurological:      Mental Status: She is alert and oriented to person, place, and time.      Sensory: Sensation is intact.      Motor: Motor function is intact.      Coordination: Coordination is intact.      Deep Tendon Reflexes: Reflexes are normal and symmetric.        Neurologic Exam     Mental Status   Oriented to person, place, and time.        Result Review :             MRI C Spine:   1. 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    2. No spinal canal stenosis  3. Mild multilevel neural foraminal narrowing as detailed above  4. No pathologic contrast enhancement    Assessment and Plan    Diagnoses and all orders for this visit:    1. Bilateral occipital neuralgia (Primary)  Assessment & Plan:  Will order bilateral occipital nerve blocks.       Orders:  -     Ibuprofen 3 %, Gabapentin 10 %, Baclofen 2 %, lidocaine 4 %, Ketamine HCl 4 %; Apply 1-2 g topically to the appropriate area as directed 3 " (Three) to 4 (Four) times daily.  Dispense: 90 g; Refill: 0    2. Cervicalgia  Assessment & Plan:  Will order trigger point injections to bilateral cervical paraspinal and trapezius muscles. Will send in neuropathic topical cream.     Orders:  -     Ibuprofen 3 %, Gabapentin 10 %, Baclofen 2 %, lidocaine 4 %, Ketamine HCl 4 %; Apply 1-2 g topically to the appropriate area as directed 3 (Three) to 4 (Four) times daily.  Dispense: 90 g; Refill: 0        Follow Up   Return in about 4 months (around 3/13/2024) for Migraine f/u.  Patient was given instructions and counseling regarding her condition or for health maintenance advice. Please see specific information pulled into the AVS if appropriate.

## 2023-12-14 ENCOUNTER — PROCEDURE VISIT (OUTPATIENT)
Dept: NEUROLOGY | Facility: CLINIC | Age: 63
End: 2023-12-14
Payer: MEDICARE

## 2023-12-14 DIAGNOSIS — M54.2 CERVICALGIA: ICD-10-CM

## 2023-12-14 DIAGNOSIS — M54.81 BILATERAL OCCIPITAL NEURALGIA: Primary | ICD-10-CM

## 2023-12-14 RX ORDER — BUPIVACAINE HYDROCHLORIDE 2.5 MG/ML
10 INJECTION, SOLUTION INFILTRATION; PERINEURAL ONCE
Status: COMPLETED | OUTPATIENT
Start: 2023-12-14 | End: 2023-12-14

## 2023-12-14 RX ADMIN — BUPIVACAINE HYDROCHLORIDE 10 ML: 2.5 INJECTION, SOLUTION INFILTRATION; PERINEURAL at 15:38

## 2023-12-14 NOTE — PROGRESS NOTES
Procedure   Inject Trigger Points, > 3    Date/Time: 12/14/2023 4:27 PM    Performed by: Landy Lynch APRN  Authorized by: Landy Lynch APRN  Local anesthesia used: yes  Anesthesia: local infiltration    Anesthesia:  Local anesthesia used: yes  Local Anesthetic: bupivacaine 0.25% without epinephrine  Anesthetic total: 7 mL    Sedation:  Patient sedated: no    Patient tolerance: patient tolerated the procedure well with no immediate complications  Comments: Injected trigger points to bilateral cervical paraspinal and trapezius muscles       CRBTPI    Date/Time: 12/14/2023 4:27 PM    Performed by: Landy Lynch APRN  Authorized by: Landy Lynch APRN  Indications: pain relief  Body area: head (Greater and Lesser occipital nerve)  Nerve: greater occipital  Laterality: Bilateral.    Sedation:  Patient sedated: no    Preparation: Aseptic Technique.  Patient position: sitting  Needle size: 27 G  Location technique: anatomical landmarks  Local Anesthetic: bupivacaine 0.25% without epinephrine  Anesthetic total: 3 mL  Patient tolerance: Patient tolerated the procedure well with no immediate complications

## 2024-01-09 NOTE — PROGRESS NOTES
"Well Woman Visit    CC: Scheduled annual well gyn visit  Chief Complaint   Patient presents with    Annual Exam       [facility] intake in the past?: No        Post menopausal, using no HRT    HPI:   63 y.o.     PCP: does manage PMHx and preventative labs  History: PMHx, Meds, Allergies, PSHx, Social Hx, and POBHx all reviewed and updated.    Patient is complaining of vaginal dryness, dyspareunia and decreased desire    PHYSICAL EXAM:  /71   Pulse 59   Ht 167.6 cm (66\")   Wt 66.2 kg (146 lb)   Breastfeeding No   BMI 23.57 kg/m²  Not found.  General- NAD, alert and oriented, appropriate  Psych- Normal mood, good memory  Neck- No masses, no thyroid enlargement  CV- Regular rhythm, no murnurs  Resp- CTA to bases, no wheezes  Abdomen- Soft, non distended, non tender, no masses    Breast left-  Bilaterally symmetrical, no masses, non tender, no nipple discharge  Breast right- Bilaterally symmetrical, no masses, non tender, no nipple discharge    External genitalia- Normal female, no lesions, severe vulvovaginal atrophy with complete resorption of right labia minora and severe resorption of left labia minora  Urethra/meatus- Normal, no masses, non tender  Bladder- Normal, no masses, non tender  Vagina- Normal, severe atrophy, no lesions, no discharge.    Cvx- Normal, no lesions, no discharge, No cervical motion tenderness  Uterus- Normal size, shape & consistency.  Non tender, mobile.  Adnexa- No mass, non tender  Anus/Rectum/Perineum- Not performed    Lymphatic- No palpable neck, axillary, or groin nodes  Ext- No edema, no cyanosis    Skin- No lesions, no rashes, no acanthosis nigricans      ASSESSMENT and PLAN:    Diagnoses and all orders for this visit:    1. Well woman exam with routine gynecological exam (Primary)  -     IgP, Aptima HPV    2. Genitourinary syndrome of menopause  Assessment & Plan:  Patient is complaining of severe vaginal dryness and subsequent dyspareunia.  On exam patient has severe " vulvovaginal atrophy with almost complete resorption of the right labia minora and moderate resorption of the left labia minora.  There were no petechiae on the vaginal exam.  Patient was counseled regarding the risks benefits of localized vaginal estrogen and how best to apply it effectively    Orders:  -     estradiol (ESTRACE VAGINAL) 0.1 MG/GM vaginal cream; Place 0.5 gm PV and massage 0.5 gm to vulva and vestibule at night 2x/week  Dispense: 42.5 g; Refill: 3    3. Decreased libido  Assessment & Plan:  Psychological condition is newly identified.  Medication changes per orders.  Psychological condition  will be reassessed in 3 months  Patient is complaining of decreased desire.  Patient is also complaining of vaginal dryness and subsequent dyspareunia.  Explained it is difficult to fully evaluate libido in the face of painful intimacy.  I recommend treating GSM completely and see if resolving dyspareunia improves libido.  If not we will reevaluate that at her next appointment.          Preventative:  BREAST HEALTH- Monthly self breast exam importance and how to reviewed. MMG and/or MRI (prn) reviewed per society guidelines and her individual history. Screen: Already up to date  CERVICAL CANCER Screening- Reviewed current ASCCP guidelines for screening w and wo cotest HPV, age specific.  Screen: Updated today  COLON CANCER Screening- Reviewed current medical society guidelines and options.  Screen:  Already up to date  Follow up PCP/Specialist PMHx and/or Labs      She understands the importance of having any ordered tests to be performed in a timely fashion.  The risks of not performing them include, but are not limited to, advanced cancer stages, bone loss from osteoporosis and/or subsequent increase in morbidity and/or mortality.  She is encouraged to review her results online and/or contact or office if she has questions.     Follow Up:  Return in about 3 months (around 4/11/2024).            Hemalatha SERRATO  MD Neil  01/11/2024    [facility]  [address] [place] [address]  Dept: [phone]  Dept Fax: [phone]  Loc: [phone]  Loc Fax: [phone]

## 2024-01-10 ENCOUNTER — PROCEDURE VISIT (OUTPATIENT)
Dept: NEUROLOGY | Facility: CLINIC | Age: 64
End: 2024-01-10
Payer: MEDICARE

## 2024-01-10 DIAGNOSIS — M54.81 BILATERAL OCCIPITAL NEURALGIA: Primary | ICD-10-CM

## 2024-01-10 DIAGNOSIS — M54.2 CERVICALGIA: ICD-10-CM

## 2024-01-10 RX ORDER — BUPIVACAINE HYDROCHLORIDE 2.5 MG/ML
10 INJECTION, SOLUTION INFILTRATION; PERINEURAL ONCE
Status: COMPLETED | OUTPATIENT
Start: 2024-01-10 | End: 2024-01-10

## 2024-01-10 RX ADMIN — BUPIVACAINE HYDROCHLORIDE 10 ML: 2.5 INJECTION, SOLUTION INFILTRATION; PERINEURAL at 15:14

## 2024-01-10 NOTE — PROGRESS NOTES
Procedure   Inject Trigger Points, > 3    Date/Time: 1/10/2024 3:25 PM    Performed by: Landy Lynch APRN  Authorized by: Landy Lynch APRN  Local anesthesia used: yes  Anesthesia: local infiltration    Anesthesia:  Local anesthesia used: yes  Local Anesthetic: bupivacaine 0.25% without epinephrine  Anesthetic total: 7 mL    Sedation:  Patient sedated: no    Patient tolerance: patient tolerated the procedure well with no immediate complications  Comments: Injected trigger points to bilateral cervical paraspinal and trapezius muscles       CRBTPI    Date/Time: 1/10/2024 3:25 PM    Performed by: Landy Lynch APRN  Authorized by: Landy Lynch APRN  Indications: pain relief  Body area: head (Greater and Lesser occipital nerve)  Nerve: greater occipital  Laterality: Bilateral.    Sedation:  Patient sedated: no    Preparation: Aseptic Technique.  Patient position: sitting  Needle size: 27 G  Location technique: anatomical landmarks  Local Anesthetic: bupivacaine 0.25% without epinephrine  Anesthetic total: 3 mL  Patient tolerance: Patient tolerated the procedure well with no immediate complications

## 2024-01-11 ENCOUNTER — OFFICE VISIT (OUTPATIENT)
Dept: OBSTETRICS AND GYNECOLOGY | Facility: CLINIC | Age: 64
End: 2024-01-11
Payer: MEDICARE

## 2024-01-11 VITALS
HEIGHT: 66 IN | SYSTOLIC BLOOD PRESSURE: 113 MMHG | DIASTOLIC BLOOD PRESSURE: 71 MMHG | BODY MASS INDEX: 23.46 KG/M2 | HEART RATE: 59 BPM | WEIGHT: 146 LBS

## 2024-01-11 DIAGNOSIS — R68.82 DECREASED LIBIDO: ICD-10-CM

## 2024-01-11 DIAGNOSIS — N95.8 GENITOURINARY SYNDROME OF MENOPAUSE: ICD-10-CM

## 2024-01-11 DIAGNOSIS — Z01.419 WELL WOMAN EXAM WITH ROUTINE GYNECOLOGICAL EXAM: Primary | ICD-10-CM

## 2024-01-11 PROCEDURE — G0123 SCREEN CERV/VAG THIN LAYER: HCPCS

## 2024-01-11 PROCEDURE — 87624 HPV HI-RISK TYP POOLED RSLT: CPT

## 2024-01-11 RX ORDER — SIMVASTATIN 20 MG
TABLET ORAL
COMMUNITY
Start: 2023-11-13

## 2024-01-11 RX ORDER — ESTRADIOL 0.1 MG/G
CREAM VAGINAL
Qty: 42.5 G | Refills: 3 | Status: SHIPPED | OUTPATIENT
Start: 2024-01-11

## 2024-01-11 NOTE — ASSESSMENT & PLAN NOTE
Patient is complaining of severe vaginal dryness and subsequent dyspareunia.  On exam patient has severe vulvovaginal atrophy with almost complete resorption of the right labia minora and moderate resorption of the left labia minora.  There were no petechiae on the vaginal exam.  Patient was counseled regarding the risks benefits of localized vaginal estrogen and how best to apply it effectively

## 2024-01-11 NOTE — ASSESSMENT & PLAN NOTE
Psychological condition is newly identified.  Medication changes per orders.  Psychological condition  will be reassessed in 3 months  Patient is complaining of decreased desire.  Patient is also complaining of vaginal dryness and subsequent dyspareunia.  Explained it is difficult to fully evaluate libido in the face of painful intimacy.  I recommend treating GSM completely and see if resolving dyspareunia improves libido.  If not we will reevaluate that at her next appointment.

## 2024-01-15 LAB
CYTOLOGIST CVX/VAG CYTO: NORMAL
CYTOLOGY CVX/VAG DOC CYTO: NORMAL
CYTOLOGY CVX/VAG DOC THIN PREP: NORMAL
DX ICD CODE: NORMAL
HIV 1 & 2 AB SER-IMP: NORMAL
HPV I/H RISK 4 DNA CVX QL PROBE+SIG AMP: NEGATIVE
OTHER STN SPEC: NORMAL
STAT OF ADQ CVX/VAG CYTO-IMP: NORMAL

## 2024-01-22 ENCOUNTER — TRANSCRIBE ORDERS (OUTPATIENT)
Dept: ADMINISTRATIVE | Facility: HOSPITAL | Age: 64
End: 2024-01-22
Payer: MEDICARE

## 2024-01-22 DIAGNOSIS — R92.8 ABNORMAL MAMMOGRAM: Primary | ICD-10-CM

## 2024-01-24 ENCOUNTER — OFFICE VISIT (OUTPATIENT)
Dept: GASTROENTEROLOGY | Facility: CLINIC | Age: 64
End: 2024-01-24
Payer: MEDICARE

## 2024-01-24 VITALS
WEIGHT: 147 LBS | HEART RATE: 67 BPM | DIASTOLIC BLOOD PRESSURE: 80 MMHG | SYSTOLIC BLOOD PRESSURE: 124 MMHG | BODY MASS INDEX: 23.63 KG/M2 | HEIGHT: 66 IN

## 2024-01-24 DIAGNOSIS — K59.04 CHRONIC IDIOPATHIC CONSTIPATION: ICD-10-CM

## 2024-01-24 DIAGNOSIS — K44.9 PARAESOPHAGEAL HERNIA: ICD-10-CM

## 2024-01-24 DIAGNOSIS — K20.90 ESOPHAGITIS: ICD-10-CM

## 2024-01-24 DIAGNOSIS — R13.19 ESOPHAGEAL DYSPHAGIA: Primary | ICD-10-CM

## 2024-01-24 RX ORDER — OMEPRAZOLE 20 MG/1
20 CAPSULE, DELAYED RELEASE ORAL DAILY
COMMUNITY
End: 2024-01-24

## 2024-01-24 RX ORDER — OMEPRAZOLE 40 MG/1
40 CAPSULE, DELAYED RELEASE ORAL DAILY
Qty: 90 CAPSULE | Refills: 1 | Status: SHIPPED | OUTPATIENT
Start: 2024-01-24 | End: 2024-04-23

## 2024-01-24 RX ORDER — LINACLOTIDE 72 UG/1
72 CAPSULE, GELATIN COATED ORAL
Qty: 90 CAPSULE | Refills: 1 | Status: SHIPPED | OUTPATIENT
Start: 2024-01-24

## 2024-01-24 NOTE — PROGRESS NOTES
Chief Complaint     Constipation and Heartburn    History of Present Illness     Ann Perla is a 63 y.o. female who presents to [facility] GASTROENTEROLOGY for follow-up of constipation, heartburn and dysphagia.    She is continuing to experience dysphagia.  Notices this more with breads, potatoes and large pills.      Admits heartburn that's present nearly daily.  It occurs regardless of meals.      Previous hernia repair in [place] in 2019 per Dr. Palacio.    She is only taking Linzess PRN and states that she experienced diarrhea each time she takes it.       History      Past Medical History:   Diagnosis Date    Abdominal pain, LLQ 12/04/2015    Back pain     Bunion     Deep vein thrombosis     Depressive disorder     Disease of thyroid gland     Foot pain, right 04/08/2021    GERD (gastroesophageal reflux disease)     Hallux valgus of right foot 12/03/2018    Hammer toe     Herpes genitalia     History of transfusion     HLD (hyperlipidemia)     Migraine     Mixed incontinence urge and stress     OAB (overactive bladder) 01/24/2018    Seizures     Urinary urgency      Past Surgical History:   Procedure Laterality Date    CERVICAL FUSION      COLONOSCOPY  2018, 2016    COLONOSCOPY N/A 08/28/2023    Procedure: COLONOSCOPY WITH POLYPECTOMIES/HOT SNARE WITH ELEVIEW INJECTION AND CLIP APPLICATION X 2;  Surgeon: Jeanette Mcmahon MD;  Location: [facility] ENDOSCOPY;  Service: Gastroenterology;  Laterality: N/A;  COLON POLYPS, DIVERTICULOSIS, HEMORRHOIDS    CYSTOSCOPY      CYSTOSCOPY RETROGRADE PYELOGRAM      ENDOSCOPY  2018    ENDOSCOPY N/A 08/28/2023    Procedure: ESOPHAGOGASTRODUODENOSCOPY WITH BIOPSIES AND ESOPHAGEAL DILATION TO 18 MM;  Surgeon: Jeanette Mcmahon MD;  Location: [facility] ENDOSCOPY;  Service: Gastroenterology;  Laterality: N/A;  ESOPHAGITIS    FOOT SURGERY      HAND SURGERY Left     HAND SURGERY Right     HIATAL HERNIA REPAIR  05/10/2019    NECK SURGERY      TUBAL  ABDOMINAL LIGATION      UPPER GASTROINTESTINAL ENDOSCOPY      VEIN SURGERY       Family History   Problem Relation Age of Onset    Stroke Mother     Hypertension Mother     Heart disease Brother     Kidney nephrosis Brother     Hypertension Maternal Grandmother     Diabetes Maternal Grandmother     Cancer Maternal Grandfather         Unspecified    Cancer Maternal Aunt     Breast cancer Maternal Aunt     Cancer Other         Unspecified    Cancer Other         Unspecified    Lung cancer Other     Colon cancer Neg Hx         Current Medications       Current Outpatient Medications:     denosumab (Prolia) 60 MG/ML solution prefilled syringe syringe, as directed subcutaneously every 6 months for 12 month(s), Disp: , Rfl:     estradiol (ESTRACE VAGINAL) 0.1 MG/GM vaginal cream, Place 0.5 gm PV and massage 0.5 gm to vulva and vestibule at night 2x/week, Disp: 42.5 g, Rfl: 3    fluticasone (FLONASE) 50 MCG/ACT nasal spray, , Disp: , Rfl:     Ibuprofen 3 %, Gabapentin 10 %, Baclofen 2 %, lidocaine 4 %, Ketamine HCl 4 %, Apply 1-2 g topically to the appropriate area as directed 3 (Three) to 4 (Four) times daily., Disp: 90 g, Rfl: 0    Linzess 72 MCG capsule capsule, Take 1 capsule by mouth Every Morning Before Breakfast., Disp: 90 capsule, Rfl: 1    polyethylene glycol (MIRALAX) 17 GM/SCOOP powder, , Disp: , Rfl:     PreviDent 5000 Plus 1.1 % cream, As Needed., Disp: , Rfl:     propranolol LA (INDERAL LA) 60 MG 24 hr capsule, propranolol ER 60 mg capsule,24 hr,extended release, Disp: , Rfl:     QUEtiapine (SEROquel) 200 MG tablet, 1 tab(s) orally 1 times a day at bedtime for 90 days, Disp: , Rfl:     simethicone (Mi-Acid Gas Relief) 80 MG chewable tablet, 1 tab(s) chewed 4 times a day PRN for 30 day(s), Disp: , Rfl:     simvastatin (ZOCOR) 20 MG tablet, , Disp: , Rfl:     solifenacin (VESIcare) 5 MG tablet, Daily., Disp: , Rfl:     Synthroid 50 MCG tablet, , Disp: , Rfl:     topiramate (Topamax) 100 MG tablet, 1 tab(s)  "orally at bedtime for 90 days, Disp: , Rfl:     tretinoin (RETIN-A) 0.025 % cream, , Disp: , Rfl:     valACYclovir (Valtrex) 500 MG tablet, Daily., Disp: , Rfl:     Vibegron (Gemtesa) 75 MG tablet, Take 1 tablet by mouth Daily for 180 days., Disp: 90 tablet, Rfl: 1    vilazodone (VIIBRYD) 40 MG tablet tablet, Viibryd 40 mg tablet, Disp: , Rfl:     vitamin D (ERGOCALCIFEROL) 1.25 MG (15411 UT) capsule capsule, , Disp: , Rfl:     Xiidra 5 % ophthalmic solution, 1 (One) Time., Disp: , Rfl:     omeprazole (priLOSEC) 40 MG capsule, Take 1 capsule by mouth Daily for 90 days., Disp: 90 capsule, Rfl: 1     Allergies     No Known Allergies    Social History       Social History     Social History Narrative    Not on file         Objective       /80 (BP Location: Left arm, Patient Position: Sitting, Cuff Size: Adult)   Pulse 67   Ht 167.6 cm (66\")   Wt 66.7 kg (147 lb)   BMI 23.73 kg/m²       Physical Exam    Results       Result Review :    The following data was reviewed by: LAURENCE Li on 01/24/2024:    CBC w/diff          5/15/2023    15:34   CBC w/Diff   WBC 5.92    RBC 4.46    Hemoglobin 14.4    Hematocrit 42.9    MCV 96.2    MCH 32.3    MCHC 33.6    RDW 12.4    Platelets 277    Neutrophil Rel % 55.9    Immature Granulocyte Rel % 0.3    Lymphocyte Rel % 34.8    Monocyte Rel % 5.7    Eosinophil Rel % 2.5    Basophil Rel % 0.8      CMP          5/15/2023    15:34 8/18/2023    13:07 9/8/2023    14:37   CMP   Glucose 108   109    BUN 13   12    Creatinine 0.87  0.80  0.91    EGFR 75.4  83.4  71.5    Sodium 138   142    Potassium 4.2   4.2    Chloride 103   107    Calcium 9.7   9.9    Total Protein 7.6   7.2    Albumin 4.3   4.4    Globulin 3.3   2.8    Total Bilirubin 0.3   0.3    Alkaline Phosphatase 80   107    AST (SGOT) 15   15    ALT (SGPT) 14   20    Albumin/Globulin Ratio 1.3   1.6    BUN/Creatinine Ratio 14.9   13.2    Anion Gap 10.5   8.0      8/28/2023 EGD-nonsevere esophagitis at the GE " junction, biopsy-mild changes suggestive of reflux esophagitis.  Negative for intestinal metaplasia.  Empirically dilated up to 18 mm.  Medium sized paraesophageal hernia was found.  Stomach appeared normal, gastric antrum biopsy-normal.  Normal duodenum.  Colonoscopy-perianal and digital rectal exams were normal.  Terminal ileum appeared normal.  15 mm polyp in the ascending colon-tubular adenoma, completely removed and 2 clips placed.  7 mm polyp in the descending colon-tubular adenoma, completely removed.  Diverticulosis in the sigmoid colon.  Grade 1 internal hemorrhoids.  Recommend repeat colonoscopy in 3 years.           Assessment and Plan              Diagnoses and all orders for this visit:    1. Esophageal dysphagia (Primary)  -     FL ESOPHAGRAM DOUBLE CONTRAST; Future    2. Paraesophageal hernia  -     FL ESOPHAGRAM DOUBLE CONTRAST; Future    3. Chronic idiopathic constipation  -     Linzess 72 MCG capsule capsule; Take 1 capsule by mouth Every Morning Before Breakfast.  Dispense: 90 capsule; Refill: 1    4. Esophagitis  -     omeprazole (priLOSEC) 40 MG capsule; Take 1 capsule by mouth Daily for 90 days.  Dispense: 90 capsule; Refill: 1      Reviewed administration instructions in regards to Linzess.  Patient to try Linzess daily on an empty stomach x 2 weeks.  If no improvement in diarrhea associated with Linzess she will notify the office.      Follow Up     Follow Up   Return in about 6 months (around 7/24/2024) for GERD, constipation.  Patient was given instructions and counseling regarding her condition or for health maintenance advice. Please see specific information pulled into the AVS if appropriate.

## 2024-01-26 ENCOUNTER — TELEPHONE (OUTPATIENT)
Dept: GASTROENTEROLOGY | Facility: CLINIC | Age: 64
End: 2024-01-26
Payer: MEDICARE

## 2024-01-26 NOTE — TELEPHONE ENCOUNTER
Patient was prescribed Linzess at her last appointment and was notified that it needs a Prior Authorization.

## 2024-01-29 ENCOUNTER — TELEPHONE (OUTPATIENT)
Dept: PODIATRY | Facility: CLINIC | Age: 64
End: 2024-01-29
Payer: MEDICARE

## 2024-01-29 NOTE — TELEPHONE ENCOUNTER
Pt states her pharmacy says she needs a prior auth I advised per [employer] medicare advantage, she does not need one. She states she uses  for medications. Will submit PA

## 2024-01-29 NOTE — TELEPHONE ENCOUNTER
Called to schedule patient for Closed nondisplaced fracture of phalanx of left great toe, David Quinn said sometime this week.   Couldn't leave VM pt picked up and hung up the phone.

## 2024-01-31 ENCOUNTER — PROCEDURE VISIT (OUTPATIENT)
Dept: NEUROLOGY | Facility: CLINIC | Age: 64
End: 2024-01-31
Payer: MEDICARE

## 2024-01-31 ENCOUNTER — OFFICE VISIT (OUTPATIENT)
Dept: PODIATRY | Facility: CLINIC | Age: 64
End: 2024-01-31
Payer: MEDICARE

## 2024-01-31 VITALS
HEIGHT: 66 IN | BODY MASS INDEX: 23.78 KG/M2 | WEIGHT: 148 LBS | TEMPERATURE: 98 F | OXYGEN SATURATION: 96 % | SYSTOLIC BLOOD PRESSURE: 96 MMHG | HEART RATE: 62 BPM | DIASTOLIC BLOOD PRESSURE: 59 MMHG

## 2024-01-31 DIAGNOSIS — M79.672 FOOT PAIN, LEFT: ICD-10-CM

## 2024-01-31 DIAGNOSIS — M54.81 BILATERAL OCCIPITAL NEURALGIA: ICD-10-CM

## 2024-01-31 DIAGNOSIS — M54.2 CERVICALGIA: Primary | ICD-10-CM

## 2024-01-31 DIAGNOSIS — S92.425A CLOSED NONDISPLACED FRACTURE OF DISTAL PHALANX OF LEFT GREAT TOE, INITIAL ENCOUNTER: Primary | ICD-10-CM

## 2024-01-31 RX ORDER — BUPIVACAINE HYDROCHLORIDE 2.5 MG/ML
10 INJECTION, SOLUTION INFILTRATION; PERINEURAL ONCE
Status: COMPLETED | OUTPATIENT
Start: 2024-01-31 | End: 2024-01-31

## 2024-01-31 RX ADMIN — BUPIVACAINE HYDROCHLORIDE 10 ML: 2.5 INJECTION, SOLUTION INFILTRATION; PERINEURAL at 16:23

## 2024-01-31 NOTE — PROGRESS NOTES
[facility] - PODIATRY    Today's Date: 01/31/24    Patient Name: Ann Perla  MRN: [id number]  CSN: [id number]  PCP: Nory Bryan APRN  Referring Provider: No ref. provider found    SUBJECTIVE     Chief Complaint   Patient presents with    Left Foot - Pain     Fell down steps at home 1/22/24 due to continued pain seen at  1/26/24 exam xrays placed in a boot and referred here     HPI: Ann Perla, a 63 y.o.female, presents to clinic.    New, Established, New Problem: New    Location: Left hallux    Duration: 22 January 2024    Onset: Acute fell on steps at home    Nature: Sore    Aggravating factors:  Patient relates pain is aggravated by shoe gear and ambulation.      Previous Treatment: Urgent care, x-rays, CAM Walker    Patient denies any fevers, chills, nausea, vomiting, shortness of breath, nor any other constitutional signs nor symptoms.    No other pedal complaints at this time.    Past Medical History:   Diagnosis Date    Abdominal pain, LLQ 12/04/2015    Back pain     Bunion     Deep vein thrombosis     Depressive disorder     Disease of thyroid gland     Foot pain, right 04/08/2021    GERD (gastroesophageal reflux disease)     Hallux valgus of right foot 12/03/2018    Hammer toe     Herpes genitalia     History of transfusion     HLD (hyperlipidemia)     Migraine     Mixed incontinence urge and stress     OAB (overactive bladder) 01/24/2018    Seizures     Urinary urgency      Past Surgical History:   Procedure Laterality Date    CERVICAL FUSION      COLONOSCOPY  2018, 2016    COLONOSCOPY N/A 08/28/2023    Procedure: COLONOSCOPY WITH POLYPECTOMIES/HOT SNARE WITH ELEVIEW INJECTION AND CLIP APPLICATION X 2;  Surgeon: Jeanette Mcmahon MD;  Location: [facility] ENDOSCOPY;  Service: Gastroenterology;  Laterality: N/A;  COLON POLYPS, DIVERTICULOSIS, HEMORRHOIDS    CYSTOSCOPY      CYSTOSCOPY RETROGRADE PYELOGRAM      ENDOSCOPY  2018    ENDOSCOPY N/A 08/28/2023     Procedure: ESOPHAGOGASTRODUODENOSCOPY WITH BIOPSIES AND ESOPHAGEAL DILATION TO 18 MM;  Surgeon: Jeanette Mcmahon MD;  Location: [facility] ENDOSCOPY;  Service: Gastroenterology;  Laterality: N/A;  ESOPHAGITIS    FOOT SURGERY      HAND SURGERY Left     HAND SURGERY Right     HIATAL HERNIA REPAIR  05/10/2019    NECK SURGERY      TUBAL ABDOMINAL LIGATION      UPPER GASTROINTESTINAL ENDOSCOPY      VEIN SURGERY       Family History   Problem Relation Age of Onset    Stroke Mother     Hypertension Mother     Heart disease Brother     Kidney nephrosis Brother     Hypertension Maternal Grandmother     Diabetes Maternal Grandmother     Cancer Maternal Grandfather         Unspecified    Cancer Maternal Aunt     Breast cancer Maternal Aunt     Cancer Other         Unspecified    Cancer Other         Unspecified    Lung cancer Other     Colon cancer Neg Hx      Social History     Socioeconomic History    Marital status:     Number of children: 2   Tobacco Use    Smoking status: Never    Smokeless tobacco: Never   Vaping Use    Vaping Use: Never used   Substance and Sexual Activity    Alcohol use: Not Currently     Comment: former- 7-8-19    Drug use: Never    Sexual activity: Not Currently     No Known Allergies  Current Outpatient Medications   Medication Sig Dispense Refill    denosumab (Prolia) 60 MG/ML solution prefilled syringe syringe as directed subcutaneously every 6 months for 12 month(s)      estradiol (ESTRACE VAGINAL) 0.1 MG/GM vaginal cream Place 0.5 gm PV and massage 0.5 gm to vulva and vestibule at night 2x/week 42.5 g 3    fluticasone (FLONASE) 50 MCG/ACT nasal spray       ibuprofen (ADVIL,MOTRIN) 600 MG tablet Take 1 tablet by mouth Every 8 (Eight) Hours As Needed (Pain). 30 tablet 0    Ibuprofen 3 %, Gabapentin 10 %, Baclofen 2 %, lidocaine 4 %, Ketamine HCl 4 % Apply 1-2 g topically to the appropriate area as directed 3 (Three) to 4 (Four) times daily. 90 g 0    Linzess 72 MCG capsule capsule Take 1  capsule by mouth Every Morning Before Breakfast. 90 capsule 1    omeprazole (priLOSEC) 40 MG capsule Take 1 capsule by mouth Daily for 90 days. 90 capsule 1    polyethylene glycol (MIRALAX) 17 GM/SCOOP powder       PreviDent 5000 Plus 1.1 % cream As Needed.      propranolol LA (INDERAL LA) 60 MG 24 hr capsule propranolol ER 60 mg capsule,24 hr,extended release      QUEtiapine (SEROquel) 200 MG tablet 1 tab(s) orally 1 times a day at bedtime for 90 days      simethicone (Mi-Acid Gas Relief) 80 MG chewable tablet 1 tab(s) chewed 4 times a day PRN for 30 day(s)      simvastatin (ZOCOR) 20 MG tablet       solifenacin (VESIcare) 5 MG tablet Daily.      Synthroid 50 MCG tablet       topiramate (Topamax) 100 MG tablet 1 tab(s) orally at bedtime for 90 days      tretinoin (RETIN-A) 0.025 % cream       valACYclovir (Valtrex) 500 MG tablet Daily.      Vibegron (Gemtesa) 75 MG tablet Take 1 tablet by mouth Daily for 180 days. 90 tablet 1    vilazodone (VIIBRYD) 40 MG tablet tablet Viibryd 40 mg tablet      vitamin D (ERGOCALCIFEROL) 1.25 MG (82212 UT) capsule capsule       Xiidra 5 % ophthalmic solution 1 (One) Time.       No current facility-administered medications for this visit.     Review of Systems   Constitutional: Negative.    Musculoskeletal:         Left hallux   All other systems reviewed and are negative.      OBJECTIVE     Vitals:    01/31/24 0954   BP: 96/59   Pulse: 62   Temp: 98 °F (36.7 °C)   SpO2: 96%       WBC   Date Value Ref Range Status   05/15/2023 5.92 3.40 - 10.80 10*3/mm3 Final     RBC   Date Value Ref Range Status   05/15/2023 4.46 3.77 - 5.28 10*6/mm3 Final     Hemoglobin   Date Value Ref Range Status   05/15/2023 14.4 12.0 - 15.9 g/dL Final     Hematocrit   Date Value Ref Range Status   05/15/2023 42.9 34.0 - 46.6 % Final     MCV   Date Value Ref Range Status   05/15/2023 96.2 79.0 - 97.0 fL Final     MCH   Date Value Ref Range Status   05/15/2023 32.3 26.6 - 33.0 pg Final     [facility]   Date Value Ref  Range Status   05/15/2023 33.6 31.5 - 35.7 g/dL Final     RDW   Date Value Ref Range Status   05/15/2023 12.4 12.3 - 15.4 % Final     RDW-SD   Date Value Ref Range Status   05/15/2023 43.9 37.0 - 54.0 fl Final     MPV   Date Value Ref Range Status   05/15/2023 9.2 6.0 - 12.0 fL Final     Platelets   Date Value Ref Range Status   05/15/2023 277 140 - 450 10*3/mm3 Final     Neutrophil %   Date Value Ref Range Status   05/15/2023 55.9 42.7 - 76.0 % Final     Lymphocyte %   Date Value Ref Range Status   05/15/2023 34.8 19.6 - 45.3 % Final     Monocyte %   Date Value Ref Range Status   05/15/2023 5.7 5.0 - 12.0 % Final     Eosinophil %   Date Value Ref Range Status   05/15/2023 2.5 0.3 - 6.2 % Final     Basophil %   Date Value Ref Range Status   05/15/2023 0.8 0.0 - 1.5 % Final     Immature Grans %   Date Value Ref Range Status   05/15/2023 0.3 0.0 - 0.5 % Final     Neutrophils, Absolute   Date Value Ref Range Status   05/15/2023 3.30 1.70 - 7.00 10*3/mm3 Final     Lymphocytes, Absolute   Date Value Ref Range Status   05/15/2023 2.06 0.70 - 3.10 10*3/mm3 Final     Monocytes, Absolute   Date Value Ref Range Status   05/15/2023 0.34 0.10 - 0.90 10*3/mm3 Final     Eosinophils, Absolute   Date Value Ref Range Status   05/15/2023 0.15 0.00 - 0.40 10*3/mm3 Final     Basophils, Absolute   Date Value Ref Range Status   05/15/2023 0.05 0.00 - 0.20 10*3/mm3 Final     Immature Grans, Absolute   Date Value Ref Range Status   05/15/2023 0.02 0.00 - 0.05 10*3/mm3 Final     nRBC   Date Value Ref Range Status   05/15/2023 0.0 0.0 - 0.2 /100 WBC Final         Lab Results   Component Value Date    GLUCOSE 109 (H) 09/08/2023    BUN 12 09/08/2023    CREATININE 0.91 09/08/2023    EGFR 71.5 09/08/2023    BCR 13.2 09/08/2023    K 4.2 09/08/2023    CO2 27.0 09/08/2023    CALCIUM 9.9 09/08/2023    ALBUMIN 4.4 09/08/2023    BILITOT 0.3 09/08/2023    AST 15 09/08/2023    ALT 20 09/08/2023       Patient seen in no apparent distress.      PHYSICAL  EXAM:     Foot/Ankle Exam    GENERAL  Appearance:  appears stated age  Orientation:  AAOx3  Affect:  appropriate  Gait:  unimpaired  Assistance:  independent  Right shoe gear: casual shoe  Left shoe gear: CAM boot    VASCULAR     Right Foot Vascularity   Dorsalis pedis:  2+  Posterior tibial:  2+  Skin temperature:  warm  Edema grading:  None  CFT:  < 3 seconds  Pedal hair growth:  Absent  Varicosities:  mild varicosities     Left Foot Vascularity   Dorsalis pedis:  2+  Posterior tibial:  2+  Skin temperature:  warm  Edema grading:  None  CFT:  < 3 seconds  Pedal hair growth:  Absent  Varicosities:  moderate varicosities     NEUROLOGIC     Right Foot Neurologic   Normal sensation    Light touch sensation: normal  Vibratory sensation: normal  Hot/Cold sensation: normal  Protective Sensation using [place] Monofilament:   Sites intact: 10  Sites tested: 10     Left Foot Neurologic   Normal sensation    Light touch sensation: normal  Vibratory sensation: normal  Hot/Cold sensation:  normal  Protective Sensation using [place] Monofilament:   Sites intact: 10  Sites tested: 10    MUSCULOSKELETAL     Left Foot Musculoskeletal   Ecchymosis:  toe 1  Tenderness:  toe 1 tenderness    MUSCLE STRENGTH     Right Foot Muscle Strength   Foot dorsiflexion:  4  Foot plantar flexion:  4  Foot inversion:  4  Foot eversion:  4     Left Foot Muscle Strength   Foot dorsiflexion:  4  Foot plantar flexion:  4  Foot inversion:  4  Foot eversion:  4    RANGE OF MOTION     Right Foot Range of Motion   Foot and ankle ROM within normal limits       Left Foot Range of Motion   Foot and ankle ROM within normal limits    1st IP extension: with pain  1st IP flexion: with pain    DERMATOLOGIC      Right Foot Dermatologic   Skin  Right foot skin is intact.      Left Foot Dermatologic   Skin  Left foot skin is intact.       RADIOLOGY:      XR Foot 3+ View Left    Result Date: 1/26/2024  Narrative: PROCEDURE: XR FOOT 3+ VW LEFT   COMPARISON: None  INDICATIONS: left great toe injury x 1 week  FINDINGS:   Hallux valgus deformity is present.  There are degenerative changes in the left 1st MTP joint.  There is linear lucency at the proximal medial aspect of the distal phalanx of the left great toe suspicious for a fracture.  IMPRESSION: Nondisplaced fracture at the proximal medial aspect of the distal phalanx of the left great toe.   KUN BENAVIDES MD       Electronically Signed and Approved By: KUN BENAVIDES MD on 1/26/2024 at 16:17              ASSESSMENT/PLAN     Diagnoses and all orders for this visit:    1. Closed nondisplaced fracture of distal phalanx of left great toe, initial encounter (Primary)    2. Foot pain, left        Comprehensive lower extremity examination and evaluation was performed.    Discussed findings and treatment plan including risks, benefits, and treatment options with patient in detail. Patient agreed with treatment plan.    Medications and allergies reviewed.  Reviewed available lab values along with other pertinent labs.  These were discussed with the patient.    Patient was shown how to properly jordan tape the fractured toe to the toe next to it with [place].  Patient is to changes daily.  Patient is to discontinue when symptoms resolved.  The patient states understanding and agreement with this plan.    Rice Therapy: It is important to treat any injury as soon as possible to help control swelling and increase recovery time. The recognized regimen for immediate treatment of sport injuries includes rest, ice (cold application), compression, and elevation (RICE). Remove the injured athlete from play, apply ice to the affected area, wrap or compress the injured area with an elastic bandage when appropriate, and elevate the injured area above heart level to reduce swelling.  The patient is to not use ice for longer than 20 minutes at a time, with at least 20 minutes of no ice usage between applications.  The  patient states understanding and agreement with this plan.    Patient instructed use OTC analgesics with dosing per package insert as needed.  Patient states understanding and agreement with this plan.    Patient may begin to weight bear as tolerated in supportive shoes.  No impact activities for one month.  After that time, the patient may increase activities as tolerated.  Patient states understanding and agreement with this plan.    Discussed proper shoegear for the patient's feet and medical condition.  Patient states understanding and agreement with this plan.    Patient is to monitor for recurrence and any new symptoms and to contact Dr. Zamorano's office for a follow-up appointment.      The patient states understanding and agreement with this plan.    An After Visit Summary was printed and given to the patient at discharge, including (if requested) any available informative/educational handouts regarding diagnosis, treatment, or medications. All questions were answered to patient/family satisfaction. Should symptoms fail to improve or worsen they agree to call or return to clinic or to go to the Emergency Department. Discussed the importance of following up with any needed screening tests/labs/specialist appointments and any requested follow-up recommended by me today. Importance of maintaining follow-up discussed and patient accepts that missed appointments can delay diagnosis and potentially lead to worsening of conditions.    Return if symptoms worsen or fail to improve., or sooner if acute issues arise.    This document has been electronically signed by Lawrence Zamorano DPM on January 31, 2024 12:11 EST

## 2024-02-01 NOTE — PROGRESS NOTES
Procedure   Inject Trigger Points, > 3    Date/Time: 1/31/2024 4:15 PM    Performed by: Landy Lynch APRN  Authorized by: Landy Lynch APRN  Local anesthesia used: yes  Anesthesia: local infiltration    Anesthesia:  Local anesthesia used: yes  Local Anesthetic: bupivacaine 0.25% without epinephrine  Anesthetic total: 7 mL    Sedation:  Patient sedated: no    Patient tolerance: patient tolerated the procedure well with no immediate complications  Comments: Injected trigger points to bilateral cervical paraspinal and trapezius muscles       CRBTPI    Date/Time: 1/31/2024 4:15 PM    Performed by: Landy Lynch APRN  Authorized by: Landy Lynch APRN  Indications: pain relief  Body area: head (Greater and Lesser occipital nerve)  Nerve: greater occipital  Laterality: Bilateral.    Sedation:  Patient sedated: no    Preparation: Aseptic Technique.  Patient position: sitting  Needle size: 27 G  Location technique: anatomical landmarks  Local Anesthetic: bupivacaine 0.25% without epinephrine  Anesthetic total: 3 mL  Patient tolerance: Patient tolerated the procedure well with no immediate complications

## 2024-02-19 ENCOUNTER — OFFICE VISIT (OUTPATIENT)
Dept: UROLOGY | Facility: CLINIC | Age: 64
End: 2024-02-19
Payer: MEDICARE

## 2024-02-19 VITALS
SYSTOLIC BLOOD PRESSURE: 108 MMHG | BODY MASS INDEX: 24.17 KG/M2 | HEIGHT: 66 IN | HEART RATE: 65 BPM | DIASTOLIC BLOOD PRESSURE: 67 MMHG | WEIGHT: 150.4 LBS

## 2024-02-19 DIAGNOSIS — R39.15 URINARY URGENCY: Primary | ICD-10-CM

## 2024-02-19 DIAGNOSIS — N32.81 OAB (OVERACTIVE BLADDER): ICD-10-CM

## 2024-02-19 LAB
BACTERIA UR QL AUTO: NORMAL /HPF
BILIRUB BLD-MCNC: NEGATIVE MG/DL
CLARITY, POC: CLEAR
COLOR UR: YELLOW
EPI CELLS #/AREA URNS HPF: NORMAL /[HPF]
EXPIRATION DATE: ABNORMAL
GLUCOSE UR STRIP-MCNC: ABNORMAL MG/DL
KETONES UR QL: NEGATIVE
LEUKOCYTE EST, POC: ABNORMAL
Lab: ABNORMAL
NITRITE UR-MCNC: NEGATIVE MG/ML
PH UR: 7 [PH] (ref 5–8)
PROT UR STRIP-MCNC: NEGATIVE MG/DL
RBC # UR STRIP: NEGATIVE /UL
RBC # UR STRIP: NORMAL /HPF
RENAL EPITHELIAL, POC: 0
SP GR UR: 1.01 (ref 1–1.03)
UNIDENT CRYS URNS QL MICRO: NORMAL /HPF
UROBILINOGEN UR QL: NORMAL
WBC # UR STRIP: NORMAL /HPF

## 2024-02-19 RX ORDER — METRONIDAZOLE 7.5 MG/G
LOTION TOPICAL
COMMUNITY
Start: 2024-02-06

## 2024-02-19 RX ORDER — VIBEGRON 75 MG/1
75 TABLET, FILM COATED ORAL DAILY
Qty: 90 TABLET | Refills: 1 | Status: SHIPPED | OUTPATIENT
Start: 2024-02-19 | End: 2024-08-17

## 2024-02-19 NOTE — PROGRESS NOTES
"Chief Complaint  OVER ACTIVE BLADDER  (F/U)    Subjective  no acute distress        Ann Perla presents to [facility] UROLOGY  History of Present Illness  63-year-old white female continues to have urgency and urgency incontinence except when she is on Gemtesa which is really helping her.  She has occasional urgency but does not leak any urine.  She drinks a lot of fluids and urinates 7-8 times in the daytime and nocturia 1 time overall overactive assessment tool is 3/25.  No dysuria or gross hematuria.  Patient is pleased  Objective no acute distress      Vital Signs:   /67 (BP Location: Left arm, Patient Position: Sitting, Cuff Size: Adult)   Pulse 65   Ht 167.6 cm (66\")   Wt 68.2 kg (150 lb 6.4 oz)   BMI 24.28 kg/m²     No Known Allergies   Past medical history:  has a past medical history of Abdominal pain, LLQ (12/04/2015), Back pain, Bunion, Deep vein thrombosis, Depressive disorder, Disease of thyroid gland, Foot pain, right (04/08/2021), GERD (gastroesophageal reflux disease), Hallux valgus of right foot (12/03/2018), Hammer toe, Herpes genitalia, History of transfusion, HLD (hyperlipidemia), Migraine, Mixed incontinence urge and stress, OAB (overactive bladder) (01/24/2018), Seizures, and Urinary urgency.   Past surgical history:  has a past surgical history that includes Cervical fusion; Colonoscopy (2018, 2016); Cystoscopy; Esophagogastroduodenoscopy (2018); Foot surgery; Hand surgery (Left); Hand surgery (Right); Hiatal hernia repair (05/10/2019); Neck surgery; Retrograde pyelogram; Tubal ligation; Vein Surgery; Esophagogastroduodenoscopy (N/A, 08/28/2023); Colonoscopy (N/A, 08/28/2023); and Upper gastrointestinal endoscopy.  Personal history: family history includes Breast cancer in her maternal aunt; Cancer in her maternal aunt, maternal grandfather, and other family members; Diabetes in her maternal grandmother; Heart disease in her brother; Hypertension in her " maternal grandmother and mother; Kidney nephrosis in her brother; Lung cancer in an other family member; Stroke in her mother.  Social history:  reports that she has never smoked. She has never used smokeless tobacco. She reports that she does not currently use alcohol. She reports that she does not use drugs.    Review of Systems    Please see past medical and surgical history rest of the system is negative.    Physical Exam  Constitutional:       General: She is not in acute distress.     Appearance: Normal appearance. She is normal weight. She is not ill-appearing or toxic-appearing.   HENT:      Head: Normocephalic and atraumatic.      Ears:      Comments: No loss of hearing  Abdominal:      Palpations: Abdomen is soft. There is no mass.      Tenderness: There is no abdominal tenderness. There is no right CVA tenderness or left CVA tenderness.   Skin:     General: Skin is warm.      Coloration: Skin is not jaundiced.   Neurological:      General: No focal deficit present.      Mental Status: She is alert and oriented to person, place, and time.      Motor: No weakness.      Gait: Gait normal.   Psychiatric:         Mood and Affect: Mood normal.         Behavior: Behavior normal.         Thought Content: Thought content normal.         Judgment: Judgment normal.        Result Review :                 Assessment and Plan    Diagnoses and all orders for this visit:    1. Urinary urgency (Primary)  -     POC Urinalysis Dipstick, Automated  -     Vibegron (Gemtesa) 75 MG tablet; Take 1 tablet by mouth Daily for 180 days.  Dispense: 90 tablet; Refill: 1  -     POC Urine Microscopic Only    2. OAB (overactive bladder)  -     POC Urinalysis Dipstick, Automated  -     Vibegron (Gemtesa) 75 MG tablet; Take 1 tablet by mouth Daily for 180 days.  Dispense: 90 tablet; Refill: 1  -     POC Urine Microscopic Only      Patient is very happy with Gemtesa and will continue that medicine and recheck her in 6 months time  Brief  Urine Lab Results  (Last result in the past 365 days)        Color   Clarity   Blood   Leuk Est   Nitrite   Protein   CREAT   Urine HCG        02/19/24 1011 Yellow   Clear   Negative   Small (1+)   Negative   Negative                    Follow Up   Return in about 6 months (around 8/19/2024).  Patient was given instructions and counseling regarding her condition or for health maintenance advice. Please see specific information pulled into the AVS if appropriate.     Lizz King MD

## 2024-02-21 ENCOUNTER — PROCEDURE VISIT (OUTPATIENT)
Dept: NEUROLOGY | Facility: CLINIC | Age: 64
End: 2024-02-21
Payer: MEDICARE

## 2024-02-21 DIAGNOSIS — M54.2 CERVICALGIA: Primary | ICD-10-CM

## 2024-02-21 DIAGNOSIS — M54.81 BILATERAL OCCIPITAL NEURALGIA: ICD-10-CM

## 2024-02-21 RX ORDER — BUPIVACAINE HYDROCHLORIDE 2.5 MG/ML
10 INJECTION, SOLUTION INFILTRATION; PERINEURAL ONCE
Status: COMPLETED | OUTPATIENT
Start: 2024-02-21 | End: 2024-02-21

## 2024-02-21 RX ADMIN — BUPIVACAINE HYDROCHLORIDE 10 ML: 2.5 INJECTION, SOLUTION INFILTRATION; PERINEURAL at 16:25

## 2024-02-22 ENCOUNTER — HOSPITAL ENCOUNTER (OUTPATIENT)
Dept: ULTRASOUND IMAGING | Facility: HOSPITAL | Age: 64
Discharge: HOME | End: 2024-02-22
Payer: MEDICARE

## 2024-02-22 ENCOUNTER — HOSPITAL ENCOUNTER (OUTPATIENT)
Dept: MAMMOGRAPHY | Facility: HOSPITAL | Age: 64
Discharge: HOME | End: 2024-02-22
Payer: MEDICARE

## 2024-02-22 DIAGNOSIS — R92.8 ABNORMAL MAMMOGRAM: ICD-10-CM

## 2024-02-22 PROCEDURE — 76642 ULTRASOUND BREAST LIMITED: CPT

## 2024-02-22 PROCEDURE — 77065 DX MAMMO INCL CAD UNI: CPT

## 2024-02-22 PROCEDURE — G0279 TOMOSYNTHESIS, MAMMO: HCPCS

## 2024-02-23 NOTE — PROGRESS NOTES
Procedure   Inject Trigger Points, > 3    Date/Time: 2/21/2024 4:10 PM    Performed by: Landy Lynch APRN  Authorized by: Landy Lynch APRN  Local anesthesia used: yes  Anesthesia: local infiltration    Anesthesia:  Local anesthesia used: yes  Local Anesthetic: bupivacaine 0.25% without epinephrine  Anesthetic total: 7 mL    Sedation:  Patient sedated: no    Patient tolerance: patient tolerated the procedure well with no immediate complications  Comments: Injected trigger points to bilateral cervical paraspinal and trapezius muscles       CRBTPI    Date/Time: 2/21/2024 4:10 PM    Performed by: Landy Lynch APRN  Authorized by: Landy Lynch APRN  Indications: pain relief  Body area: head (Greater and Lesser occipital nerve)  Nerve: greater occipital  Laterality: Bilateral.    Sedation:  Patient sedated: no    Preparation: Aseptic Technique.  Patient position: sitting  Needle size: 27 G  Location technique: anatomical landmarks  Local Anesthetic: bupivacaine 0.25% without epinephrine  Anesthetic total: 3 mL  Patient tolerance: Patient tolerated the procedure well with no immediate complications

## 2024-03-07 ENCOUNTER — PROCEDURE VISIT (OUTPATIENT)
Dept: NEUROLOGY | Facility: CLINIC | Age: 64
End: 2024-03-07
Payer: MEDICARE

## 2024-03-07 DIAGNOSIS — M54.2 CERVICALGIA: Primary | ICD-10-CM

## 2024-03-07 DIAGNOSIS — M54.81 BILATERAL OCCIPITAL NEURALGIA: ICD-10-CM

## 2024-03-07 RX ORDER — BUPIVACAINE HYDROCHLORIDE 2.5 MG/ML
10 INJECTION, SOLUTION INFILTRATION; PERINEURAL ONCE
Status: COMPLETED | OUTPATIENT
Start: 2024-03-07 | End: 2024-03-07

## 2024-03-07 RX ADMIN — BUPIVACAINE HYDROCHLORIDE 10 ML: 2.5 INJECTION, SOLUTION INFILTRATION; PERINEURAL at 15:01

## 2024-03-07 NOTE — PROGRESS NOTES
Procedure   Inject Trigger Points, > 3    Date/Time: 3/7/2024 3:05 PM    Performed by: Landy Lynch APRN  Authorized by: Landy Lynch APRN  Local anesthesia used: yes  Anesthesia: local infiltration    Anesthesia:  Local anesthesia used: yes  Local Anesthetic: bupivacaine 0.25% without epinephrine  Anesthetic total: 7 mL    Sedation:  Patient sedated: no    Patient tolerance: patient tolerated the procedure well with no immediate complications  Comments: Injected trigger points to bilateral cervical paraspinal and trapezius muscles       CRBTPI    Date/Time: 3/7/2024 3:05 PM    Performed by: Landy Lynch APRN  Authorized by: Landy Lynch APRN  Indications: pain relief  Body area: head (Greater and Lesser occipital nerve)  Nerve: greater occipital  Laterality: Bilateral.    Sedation:  Patient sedated: no    Preparation: Aseptic Technique.  Patient position: sitting  Needle size: 27 G  Location technique: anatomical landmarks  Local Anesthetic: bupivacaine 0.25% without epinephrine  Anesthetic total: 3 mL  Patient tolerance: Patient tolerated the procedure well with no immediate complications

## 2024-03-13 ENCOUNTER — HOSPITAL ENCOUNTER (OUTPATIENT)
Dept: GENERAL RADIOLOGY | Facility: HOSPITAL | Age: 64
Discharge: HOME | End: 2024-03-13
Admitting: NURSE PRACTITIONER
Payer: MEDICARE

## 2024-03-13 DIAGNOSIS — R13.19 ESOPHAGEAL DYSPHAGIA: ICD-10-CM

## 2024-03-13 DIAGNOSIS — K44.9 PARAESOPHAGEAL HERNIA: ICD-10-CM

## 2024-03-13 PROCEDURE — 63710000001 BARIUM SULFATE 700 MG TABLET: Performed by: NURSE PRACTITIONER

## 2024-03-13 PROCEDURE — 63710000001 BARIUM SULFATE 60 % SUSPENSION: Performed by: NURSE PRACTITIONER

## 2024-03-13 PROCEDURE — 63710000001 SOD BICARB-CITRIC ACID-SIMETHICONE 2.21-1.53-0.04 G PACK: Performed by: NURSE PRACTITIONER

## 2024-03-13 PROCEDURE — A9270 NON-COVERED ITEM OR SERVICE: HCPCS | Performed by: NURSE PRACTITIONER

## 2024-03-13 PROCEDURE — 63710000001 BARIUM SULFATE 98 % RECONSTITUTED SUSPENSION: Performed by: NURSE PRACTITIONER

## 2024-03-13 PROCEDURE — 74221 X-RAY XM ESOPHAGUS 2CNTRST: CPT

## 2024-03-13 RX ADMIN — ANTACID/ANTIFLATULENT 1 PACKET: 380; 550; 10; 10 GRANULE, EFFERVESCENT ORAL at 10:47

## 2024-03-13 RX ADMIN — BARIUM SULFATE 700 MG: 700 TABLET ORAL at 10:46

## 2024-03-13 RX ADMIN — BARIUM SULFATE 355 ML: 0.6 SUSPENSION ORAL at 10:47

## 2024-03-13 RX ADMIN — BARIUM SULFATE 135 ML: 980 POWDER, FOR SUSPENSION ORAL at 10:47

## 2024-03-14 ENCOUNTER — TELEPHONE (OUTPATIENT)
Dept: GASTROENTEROLOGY | Facility: CLINIC | Age: 64
End: 2024-03-14
Payer: MEDICARE

## 2024-03-14 NOTE — TELEPHONE ENCOUNTER
----- Message from LAURENCE Li sent at 3/14/2024 12:46 PM EDT -----  Please let pt know that esophagram shows a small hiatal hernia.  Previous hernia repair is intact.  No narrowing noted.  She does have a small diverticulum (pocket) in the esophagus.  Food or pills can sometimes become stuck in this area, but usually will pass with drinking liquids.  No surgical invention indicated due to small size.  She did have a moderate amount of reflux on exam.  Recommend strict reflux precautions - small meals, no bending over after meals.  NPO 4 hours prior to lying down.

## 2024-03-20 ENCOUNTER — TELEPHONE (OUTPATIENT)
Dept: OBSTETRICS AND GYNECOLOGY | Facility: CLINIC | Age: 64
End: 2024-03-20
Payer: MEDICARE

## 2024-03-20 NOTE — TELEPHONE ENCOUNTER
Attempted to contact patient, no answer, left VM. Needed to inform her that the PA decision for hereditary Cancer Screening labs came back as EOOP of $2079.00 due to criteria not being met according to her insurance. I let her know that, if she is still interested, to give us a call back to be placed on the lab schedule. TF

## 2024-03-27 ENCOUNTER — PROCEDURE VISIT (OUTPATIENT)
Dept: NEUROLOGY | Facility: CLINIC | Age: 64
End: 2024-03-27
Payer: MEDICARE

## 2024-03-27 DIAGNOSIS — M54.81 BILATERAL OCCIPITAL NEURALGIA: ICD-10-CM

## 2024-03-27 DIAGNOSIS — M54.2 CERVICALGIA: Primary | ICD-10-CM

## 2024-03-27 RX ORDER — TIZANIDINE 4 MG/1
4 TABLET ORAL
Qty: 30 TABLET | Refills: 3 | Status: SHIPPED | OUTPATIENT
Start: 2024-03-27

## 2024-03-27 RX ORDER — BUPIVACAINE HYDROCHLORIDE 2.5 MG/ML
10 INJECTION, SOLUTION INFILTRATION; PERINEURAL ONCE
Status: COMPLETED | OUTPATIENT
Start: 2024-03-27 | End: 2024-03-27

## 2024-03-27 RX ADMIN — BUPIVACAINE HYDROCHLORIDE 10 ML: 2.5 INJECTION, SOLUTION INFILTRATION; PERINEURAL at 16:19

## 2024-03-27 NOTE — PROGRESS NOTES
Procedure   Inject Trigger Points, > 3    Date/Time: 3/27/2024 4:21 PM    Performed by: Landy Lynch APRN  Authorized by: Landy Lynch APRN  Local anesthesia used: yes  Anesthesia: local infiltration    Anesthesia:  Local anesthesia used: yes  Local Anesthetic: bupivacaine 0.25% without epinephrine  Anesthetic total: 7 mL    Sedation:  Patient sedated: no    Patient tolerance: patient tolerated the procedure well with no immediate complications  Comments: Injected trigger points to bilateral cervical paraspinal and trapezius muscles       CRBTPI    Date/Time: 3/27/2024 4:21 PM    Performed by: Landy Lynch APRN  Authorized by: Landy Lynch APRN  Indications: pain relief  Body area: head (Greater and Lesser occipital nerve)  Nerve: greater occipital  Laterality: Bilateral.    Sedation:  Patient sedated: no    Preparation: Aseptic Technique.  Patient position: sitting  Needle size: 27 G  Location technique: anatomical landmarks  Local Anesthetic: bupivacaine 0.25% without epinephrine  Anesthetic total: 3 mL  Patient tolerance: Patient tolerated the procedure well with no immediate complications

## 2024-04-09 NOTE — PROGRESS NOTES
GYN Visit    CC: GSM    HPI:   63 y.o. Contraception or HRT: Post menopausal, using no HRT  Patient states her vaginal dryness and subsequent dyspareunia have significantly improved.  She states symptoms have not yet completely resolved but overall she feels much better.  Patient states despite improving dyspareunia she continues to have decreasing libido.  She states that this causes her distress and she is used to having more desire when she does currently.      History: PMHx, Meds, Allergies, PSHx, Social Hx, and POBHx all reviewed and updated.  Physical Exam   PHYSICAL EXAM:  BP 95/68   Pulse 69   Wt 68 kg (150 lb)   Breastfeeding No   BMI 24.21 kg/m²   General- NAD, alert and oriented, appropriate  Psych- Normal mood, good memory    ASSESSMENT AND PLAN:  Diagnoses and all orders for this visit:    1. Decreased libido (Primary)  Assessment & Plan:  Psychological condition is unchanged.  Regular aerobic exercise.  Medication changes per orders.  Psychological condition  will be reassessed in 3 months.      Orders:  -     Testosterone - total; Future    2. Genitourinary syndrome of menopause  Assessment & Plan:  Patient states she is having significantly improved symptoms of GSM.  She still has mild discomfort with intercourse but states symptoms are overall better.  Plan to continue localized vaginal estrogen cream      3. Hypoactive sexual desire disorder  Assessment & Plan:  Psychological condition is unchanged.  Regular aerobic exercise.  Medication changes per orders.  Psychological condition  will be reassessed in 3 months.  Patient was counseled at length regarding the use of transdermal testosterone in postmenopausal patients with HSDD.  We discussed the goal is to maintain a total testosterone to approximate premenopausal physiologic levels of total testosterone.  We discussed that the ISSWS guidelines recommend transdermal testosterone.  There are no FDA approved transdermal preparations for  women in the [place].  We discussed the use of AndroGel transdermal is off label.  All questions were answered.    Orders:  -     Testosterone - total; Future  -     testosterone (Testim) 50 MG/5GM (1%) gel gel; Place entirety of one tube of testim in a 5 ml syringe.  Apply 0.5 ml to back of the knee daily  Dispense: 15 g; Refill: 5    4. Encounter for screening mammogram for malignant neoplasm of breast  -     Mammo Screening Digital Tomosynthesis Bilateral With CAD; Future            Follow Up:  Return in about 2 months (around 6/11/2024).    I spent 30 minutes caring for Ann on this date of service. This time includes time spent by me in the following activities:preparing for the visit, reviewing tests, obtaining and/or reviewing a separately obtained history, performing a medically appropriate examination and/or evaluation , counseling and educating the patient/family/caregiver, ordering medications, tests, or procedures, and documenting information in the medical record      Hemalatha Trejo MD  04/11/2024

## 2024-04-11 ENCOUNTER — OFFICE VISIT (OUTPATIENT)
Dept: OBSTETRICS AND GYNECOLOGY | Facility: CLINIC | Age: 64
End: 2024-04-11
Payer: MEDICARE

## 2024-04-11 VITALS
WEIGHT: 150 LBS | SYSTOLIC BLOOD PRESSURE: 95 MMHG | HEART RATE: 69 BPM | BODY MASS INDEX: 24.21 KG/M2 | DIASTOLIC BLOOD PRESSURE: 68 MMHG

## 2024-04-11 DIAGNOSIS — N95.8 GENITOURINARY SYNDROME OF MENOPAUSE: ICD-10-CM

## 2024-04-11 DIAGNOSIS — Z12.31 ENCOUNTER FOR SCREENING MAMMOGRAM FOR MALIGNANT NEOPLASM OF BREAST: ICD-10-CM

## 2024-04-11 DIAGNOSIS — F52.0 HYPOACTIVE SEXUAL DESIRE DISORDER: ICD-10-CM

## 2024-04-11 DIAGNOSIS — R68.82 DECREASED LIBIDO: Primary | ICD-10-CM

## 2024-04-11 RX ORDER — TESTOSTERONE GEL, 1% 10 MG/G
GEL TRANSDERMAL
Qty: 15 G | Refills: 5 | Status: SHIPPED | OUTPATIENT
Start: 2024-04-11

## 2024-04-11 NOTE — ASSESSMENT & PLAN NOTE
Psychological condition is unchanged.  Regular aerobic exercise.  Medication changes per orders.  Psychological condition  will be reassessed in 3 months.

## 2024-04-11 NOTE — ASSESSMENT & PLAN NOTE
Patient states she is having significantly improved symptoms of GSM.  She still has mild discomfort with intercourse but states symptoms are overall better.  Plan to continue localized vaginal estrogen cream

## 2024-04-11 NOTE — ASSESSMENT & PLAN NOTE
Psychological condition is unchanged.  Regular aerobic exercise.  Medication changes per orders.  Psychological condition  will be reassessed in 3 months.  Patient was counseled at length regarding the use of transdermal testosterone in postmenopausal patients with HSDD.  We discussed the goal is to maintain a total testosterone to approximate premenopausal physiologic levels of total testosterone.  We discussed that the ISSWS guidelines recommend transdermal testosterone.  There are no FDA approved transdermal preparations for women in the [place].  We discussed the use of AndroGel transdermal is off label.  All questions were answered.

## 2024-04-12 ENCOUNTER — TRANSCRIBE ORDERS (OUTPATIENT)
Dept: ADMINISTRATIVE | Facility: HOSPITAL | Age: 64
End: 2024-04-12
Payer: MEDICARE

## 2024-04-12 DIAGNOSIS — M81.0 SENILE OSTEOPOROSIS: Primary | ICD-10-CM

## 2024-04-22 ENCOUNTER — OFFICE VISIT (OUTPATIENT)
Dept: PODIATRY | Facility: CLINIC | Age: 64
End: 2024-04-22
Payer: MEDICARE

## 2024-04-22 VITALS
BODY MASS INDEX: 25.07 KG/M2 | HEART RATE: 60 BPM | HEIGHT: 66 IN | DIASTOLIC BLOOD PRESSURE: 74 MMHG | TEMPERATURE: 97.1 F | OXYGEN SATURATION: 98 % | SYSTOLIC BLOOD PRESSURE: 110 MMHG | WEIGHT: 156 LBS

## 2024-04-22 DIAGNOSIS — M85.679 BONE CYST OF FOOT: Primary | ICD-10-CM

## 2024-04-22 DIAGNOSIS — S92.512A CLOSED DISPLACED FRACTURE OF PROXIMAL PHALANX OF LESSER TOE OF LEFT FOOT, INITIAL ENCOUNTER: ICD-10-CM

## 2024-04-22 PROCEDURE — 1160F RVW MEDS BY RX/DR IN RCRD: CPT | Performed by: PODIATRIST

## 2024-04-22 PROCEDURE — 3078F DIAST BP <80 MM HG: CPT | Performed by: PODIATRIST

## 2024-04-22 PROCEDURE — 99213 OFFICE O/P EST LOW 20 MIN: CPT | Performed by: PODIATRIST

## 2024-04-22 PROCEDURE — 1159F MED LIST DOCD IN RCRD: CPT | Performed by: PODIATRIST

## 2024-04-22 PROCEDURE — 3074F SYST BP LT 130 MM HG: CPT | Performed by: PODIATRIST

## 2024-04-23 ENCOUNTER — PROCEDURE VISIT (OUTPATIENT)
Dept: NEUROLOGY | Facility: CLINIC | Age: 64
End: 2024-04-23
Payer: MEDICARE

## 2024-04-23 ENCOUNTER — HOSPITAL ENCOUNTER (OUTPATIENT)
Dept: MRI IMAGING | Facility: HOSPITAL | Age: 64
Discharge: HOME | End: 2024-04-23
Admitting: PODIATRIST
Payer: MEDICARE

## 2024-04-23 DIAGNOSIS — M54.81 BILATERAL OCCIPITAL NEURALGIA: ICD-10-CM

## 2024-04-23 DIAGNOSIS — M85.679 BONE CYST OF FOOT: ICD-10-CM

## 2024-04-23 DIAGNOSIS — M54.2 CERVICALGIA: Primary | ICD-10-CM

## 2024-04-23 PROCEDURE — 0 GADOBENATE DIMEGLUMINE 529 MG/ML SOLUTION: Performed by: PODIATRIST

## 2024-04-23 PROCEDURE — A9577 INJ MULTIHANCE: HCPCS | Performed by: PODIATRIST

## 2024-04-23 PROCEDURE — 73720 MRI LWR EXTREMITY W/O&W/DYE: CPT

## 2024-04-23 RX ORDER — BUPIVACAINE HYDROCHLORIDE 2.5 MG/ML
10 INJECTION, SOLUTION INFILTRATION; PERINEURAL ONCE
Status: COMPLETED | OUTPATIENT
Start: 2024-04-23 | End: 2024-04-23

## 2024-04-23 RX ADMIN — BUPIVACAINE HYDROCHLORIDE 10 ML: 2.5 INJECTION, SOLUTION INFILTRATION; PERINEURAL at 16:26

## 2024-04-23 RX ADMIN — GADOBENATE DIMEGLUMINE 15 ML: 529 INJECTION, SOLUTION INTRAVENOUS at 08:29

## 2024-04-23 NOTE — PROGRESS NOTES
[facility] - PODIATRY    Today's Date: 04/23/24    Patient Name: Ann Perla  MRN: [id number]  CSN: [id number]  PCP: Nory Bryan APRN,   Referring Provider: Yimi Steiner PA    SUBJECTIVE     Chief Complaint   Patient presents with    Left Foot - Pain     4/15/24 dropped a pallet on her foot.  Next day dropped a metal iesha on there same foot  seen at  4/19/24 exam xray dx fx 3rd toe placed in post op shoe and referred here     HPI: Ann Perla, a 63 y.o.female, presents to clinic.    Patient is a 63-year-old female presenting with a broken toe after dropping a pallet on her foot and 4/15/2024.  She had x-rays on 419 that showed a displaced fracture of her third toe.  In January she broke her great toe after falling on her steps.  She is here for further treatment.    Past Medical History:   Diagnosis Date    Abdominal pain, LLQ 12/04/2015    Back pain     Bunion     Deep vein thrombosis     Depressive disorder     Disease of thyroid gland     Foot pain, right 04/08/2021    GERD (gastroesophageal reflux disease)     Hallux valgus of right foot 12/03/2018    Hammer toe     Herpes genitalia     History of transfusion     HLD (hyperlipidemia)     Migraine     Mixed incontinence urge and stress     OAB (overactive bladder) 01/24/2018    Seizures     Urinary urgency      Past Surgical History:   Procedure Laterality Date    CERVICAL FUSION      COLONOSCOPY  2018, 2016    COLONOSCOPY N/A 08/28/2023    Procedure: COLONOSCOPY WITH POLYPECTOMIES/HOT SNARE WITH ELEVIEW INJECTION AND CLIP APPLICATION X 2;  Surgeon: Jeanette Mcmahon MD;  Location: [facility] ENDOSCOPY;  Service: Gastroenterology;  Laterality: N/A;  COLON POLYPS, DIVERTICULOSIS, HEMORRHOIDS    CYSTOSCOPY      CYSTOSCOPY RETROGRADE PYELOGRAM      ENDOSCOPY  2018    ENDOSCOPY N/A 08/28/2023    Procedure: ESOPHAGOGASTRODUODENOSCOPY WITH BIOPSIES AND ESOPHAGEAL DILATION TO 18 MM;  Surgeon: Jeanette Mcmahon MD;   Location: [facility] ENDOSCOPY;  Service: Gastroenterology;  Laterality: N/A;  ESOPHAGITIS    FOOT SURGERY      HAND SURGERY Left     HAND SURGERY Right     HIATAL HERNIA REPAIR  05/10/2019    NECK SURGERY      TUBAL ABDOMINAL LIGATION      UPPER GASTROINTESTINAL ENDOSCOPY      VEIN SURGERY       Family History   Problem Relation Age of Onset    Stroke Mother     Hypertension Mother     Heart disease Brother     Kidney nephrosis Brother     Hypertension Maternal Grandmother     Diabetes Maternal Grandmother     Cancer Maternal Grandfather         Unspecified    Cancer Maternal Aunt     Breast cancer Maternal Aunt     Cancer Other         Unspecified    Cancer Other         Unspecified    Lung cancer Other     Colon cancer Neg Hx      Social History     Socioeconomic History    Marital status:     Number of children: 2   Tobacco Use    Smoking status: Never    Smokeless tobacco: Never   Vaping Use    Vaping status: Never Used   Substance and Sexual Activity    Alcohol use: Not Currently     Comment: former- 7-8-19    Drug use: Never    Sexual activity: Not Currently     No Known Allergies  Current Outpatient Medications   Medication Sig Dispense Refill    denosumab (Prolia) 60 MG/ML solution prefilled syringe syringe as directed subcutaneously every 6 months for 12 month(s)      estradiol (ESTRACE VAGINAL) 0.1 MG/GM vaginal cream Place 0.5 gm PV and massage 0.5 gm to vulva and vestibule at night 2x/week 42.5 g 3    Ibuprofen 3 %, Gabapentin 10 %, Baclofen 2 %, lidocaine 4 %, Ketamine HCl 4 % Apply 1-2 g topically to the appropriate area as directed 3 (Three) to 4 (Four) times daily. 90 g 0    Linzess 72 MCG capsule capsule Take 1 capsule by mouth Every Morning Before Breakfast. 90 capsule 1    meclizine (ANTIVERT) 25 MG tablet Take 1 tablet by mouth 3 (Three) Times a Day As Needed for Dizziness. 15 tablet 0    omeprazole (priLOSEC) 40 MG capsule Take 1 capsule by mouth Daily for 90 days. 90 capsule 1     polyethylene glycol (MIRALAX) 17 GM/SCOOP powder       PreviDent 5000 Plus 1.1 % cream As Needed.      propranolol LA (INDERAL LA) 60 MG 24 hr capsule propranolol ER 60 mg capsule,24 hr,extended release      QUEtiapine (SEROquel) 200 MG tablet 1 tab(s) orally 1 times a day at bedtime for 90 days      simethicone (Mi-Acid Gas Relief) 80 MG chewable tablet 1 tab(s) chewed 4 times a day PRN for 30 day(s)      simvastatin (ZOCOR) 20 MG tablet       Synthroid 50 MCG tablet       testosterone (Testim) 50 MG/5GM (1%) gel gel Place entirety of one tube of testim in a 5 ml syringe.  Apply 0.5 ml to back of the knee daily 15 g 5    tiZANidine (ZANAFLEX) 4 MG tablet Take 1 tablet by mouth every night at bedtime. 30 tablet 3    topiramate (Topamax) 100 MG tablet 1 tab(s) orally at bedtime for 90 days      valACYclovir (Valtrex) 500 MG tablet Take 1 tablet by mouth Daily.      Vibegron (Gemtesa) 75 MG tablet Take 1 tablet by mouth Daily for 180 days. 90 tablet 1    vilazodone (VIIBRYD) 40 MG tablet tablet Viibryd 40 mg tablet      vitamin D (ERGOCALCIFEROL) 1.25 MG (16913 UT) capsule capsule        No current facility-administered medications for this visit.     Review of Systems   Constitutional: Negative.    HENT: Negative.     Eyes: Negative.    Respiratory: Negative.     Cardiovascular: Negative.    Gastrointestinal: Negative.    Endocrine: Negative.    Genitourinary: Negative.    Musculoskeletal: Negative.         Left third toe pain   Skin: Negative.    Allergic/Immunologic: Negative.    Neurological: Negative.    Hematological: Negative.    Psychiatric/Behavioral: Negative.     All other systems reviewed and are negative.      OBJECTIVE     Vitals:    04/22/24 1249   BP: 110/74   Pulse: 60   Temp: 97.1 °F (36.2 °C)   SpO2: 98%       WBC   Date Value Ref Range Status   05/15/2023 5.92 3.40 - 10.80 10*3/mm3 Final     RBC   Date Value Ref Range Status   05/15/2023 4.46 3.77 - 5.28 10*6/mm3 Final     Hemoglobin   Date Value  Ref Range Status   05/15/2023 14.4 12.0 - 15.9 g/dL Final     Hematocrit   Date Value Ref Range Status   05/15/2023 42.9 34.0 - 46.6 % Final     MCV   Date Value Ref Range Status   05/15/2023 96.2 79.0 - 97.0 fL Final     MCH   Date Value Ref Range Status   05/15/2023 32.3 26.6 - 33.0 pg Final     MCHC   Date Value Ref Range Status   05/15/2023 33.6 31.5 - 35.7 g/dL Final     RDW   Date Value Ref Range Status   05/15/2023 12.4 12.3 - 15.4 % Final     RDW-SD   Date Value Ref Range Status   05/15/2023 43.9 37.0 - 54.0 fl Final     MPV   Date Value Ref Range Status   05/15/2023 9.2 6.0 - 12.0 fL Final     Platelets   Date Value Ref Range Status   05/15/2023 277 140 - 450 10*3/mm3 Final     Neutrophil %   Date Value Ref Range Status   05/15/2023 55.9 42.7 - 76.0 % Final     Lymphocyte %   Date Value Ref Range Status   05/15/2023 34.8 19.6 - 45.3 % Final     Monocyte %   Date Value Ref Range Status   05/15/2023 5.7 5.0 - 12.0 % Final     Eosinophil %   Date Value Ref Range Status   05/15/2023 2.5 0.3 - 6.2 % Final     Basophil %   Date Value Ref Range Status   05/15/2023 0.8 0.0 - 1.5 % Final     Immature Grans %   Date Value Ref Range Status   05/15/2023 0.3 0.0 - 0.5 % Final     Neutrophils, Absolute   Date Value Ref Range Status   05/15/2023 3.30 1.70 - 7.00 10*3/mm3 Final     Lymphocytes, Absolute   Date Value Ref Range Status   05/15/2023 2.06 0.70 - 3.10 10*3/mm3 Final     Monocytes, Absolute   Date Value Ref Range Status   05/15/2023 0.34 0.10 - 0.90 10*3/mm3 Final     Eosinophils, Absolute   Date Value Ref Range Status   05/15/2023 0.15 0.00 - 0.40 10*3/mm3 Final     Basophils, Absolute   Date Value Ref Range Status   05/15/2023 0.05 0.00 - 0.20 10*3/mm3 Final     Immature Grans, Absolute   Date Value Ref Range Status   05/15/2023 0.02 0.00 - 0.05 10*3/mm3 Final     nRBC   Date Value Ref Range Status   05/15/2023 0.0 0.0 - 0.2 /100 WBC Final         Lab Results   Component Value Date    GLUCOSE 109 (H) 09/08/2023     BUN 12 09/08/2023    CREATININE 0.91 09/08/2023    BCR 13.2 09/08/2023    K 4.2 09/08/2023    CO2 27.0 09/08/2023    CALCIUM 9.9 09/08/2023    ALBUMIN 4.4 09/08/2023    LABIL2 1.6 05/18/2020    AST 15 09/08/2023    ALT 20 09/08/2023       Patient seen in no apparent distress.      PHYSICAL EXAM:     Foot/Ankle Exam    RADIOLOGY:          XR Foot 3+ View Left    Result Date: 4/19/2024  Narrative: XR FOOT 3+ VW LEFT-  Date of Exam: 4/19/2024 1:09 PM  Indication: C/O left toe pain after dropping pallet on left foot  Comparison: Three-view left foot dated 1/26/2024  Findings: The fracture previously noted involving the first distal phalanx has healed. There is deformity of the distal aspect of the first metatarsal, postsurgical versus posttraumatic in etiology. There is a 1.4 cm ovoid lytic focus in the shaft of the first proximal phalanx. There appears to be some ankylosis of the second PIP joint. There is an intra-articular fracture involving the base of the third middle phalanx. There is a resulting fracture fragment measuring 0.5 cm with 0.5 cm off the base. The more distal aspect of the phalanx is displaced plantarly 0.4 cm in relation to the fragment and proximal phalanx.  Forefoot soft tissue swelling is noted.       Impression: Impression: 1.  Intra-articular fracture involving the base of the third middle phalanx, as detailed above. 2.  Postsurgical and/or posttraumatic changes of the distal first metatarsal which appear chronic. 3.  1.4 cm ovoid lytic lesion involving the shaft of the first proximal phalanx. Correlate clinically for the possibility of metastatic disease or multiple myeloma. An epidermoid or enchondroma would also be in the differential.   Electronically Signed By-Anurag Alaniz MD On:4/19/2024 1:49 PM       ASSESSMENT/PLAN     Diagnoses and all orders for this visit:    1. Bone cyst of foot (Primary)  -     Cancel: MRI Foot Left Without Contrast; Future  -     Cancel: MRI Foot Left Without  Contrast; Future    2. Closed displaced fracture of proximal phalanx of lesser toe of left foot, initial encounter        Comprehensive lower extremity examination and evaluation was performed.    Patient with bone cyst to left great toe.  Will order MRI for further evaluation.  Recommend following up with PCP for blood work.    Will patient follow-up next week after MRI.    Postop shoe to left foot to help offload toe fracture.    Discussed findings and treatment plan including risks, benefits, and treatment options with patient in detail. Patient agreed with treatment plan.    Medications and allergies reviewed.  Reviewed available lab values along with other pertinent labs.  These were discussed with the patient.    An After Visit Summary was printed and given to the patient at discharge, including (if requested) any available informative/educational handouts regarding diagnosis, treatment, or medications. All questions were answered to patient/family satisfaction. Should symptoms fail to improve or worsen they agree to call or return to clinic or to go to the Emergency Department. Discussed the importance of following up with any needed screening tests/labs/specialist appointments and any requested follow-up recommended by me today. Importance of maintaining follow-up discussed and patient accepts that missed appointments can delay diagnosis and potentially lead to worsening of conditions.    Return in about 1 week (around 4/29/2024)., or sooner if acute issues arise.    This document has been electronically signed by Dain Walker DPM on April 23, 2024 08:36 EDT

## 2024-04-25 ENCOUNTER — OFFICE VISIT (OUTPATIENT)
Dept: PODIATRY | Facility: CLINIC | Age: 64
End: 2024-04-25
Payer: MEDICARE

## 2024-04-25 VITALS
BODY MASS INDEX: 25.07 KG/M2 | DIASTOLIC BLOOD PRESSURE: 75 MMHG | WEIGHT: 156 LBS | HEIGHT: 66 IN | SYSTOLIC BLOOD PRESSURE: 104 MMHG | OXYGEN SATURATION: 98 % | HEART RATE: 60 BPM | TEMPERATURE: 97.5 F

## 2024-04-25 DIAGNOSIS — S92.512D CLOSED DISPLACED FRACTURE OF PROXIMAL PHALANX OF LESSER TOE OF LEFT FOOT WITH ROUTINE HEALING, SUBSEQUENT ENCOUNTER: Primary | ICD-10-CM

## 2024-04-25 DIAGNOSIS — M79.672 FOOT PAIN, LEFT: ICD-10-CM

## 2024-04-25 DIAGNOSIS — M85.679 BONE CYST OF FOOT: ICD-10-CM

## 2024-04-25 NOTE — PROGRESS NOTES
[facility] - PODIATRY    Today's Date: 04/25/24    Patient Name: Ann Perla  MRN: [id number]  [facility]: [id number]  PCP: Nory Bryan APRN,   Referring Provider: No ref. provider found    SUBJECTIVE     Chief Complaint   Patient presents with    Left Foot - Follow-up     Here to discuss MRI results   toe fx still sore     HPI: Ann Perla, a 63 y.o.female, presents to clinic.    Patient is a 63-year-old female presenting with a broken toe after dropping a pallet on her foot and 4/15/2024.  She had x-rays on 419 that showed a displaced fracture of her third toe.  In January she broke her great toe after falling on her steps.  She is here for further treatment.    Past Medical History:   Diagnosis Date    Abdominal pain, LLQ 12/04/2015    Back pain     Bunion     Deep vein thrombosis     Depressive disorder     Disease of thyroid gland     Foot pain, right 04/08/2021    GERD (gastroesophageal reflux disease)     Hallux valgus of right foot 12/03/2018    Hammer toe     Herpes genitalia     History of transfusion     HLD (hyperlipidemia)     Migraine     Mixed incontinence urge and stress     OAB (overactive bladder) 01/24/2018    Seizures     Urinary urgency      Past Surgical History:   Procedure Laterality Date    CERVICAL FUSION      COLONOSCOPY  2018, 2016    COLONOSCOPY N/A 08/28/2023    Procedure: COLONOSCOPY WITH POLYPECTOMIES/HOT SNARE WITH ELEVIEW INJECTION AND CLIP APPLICATION X 2;  Surgeon: Jeanette Mcmahon MD;  Location: [facility] ENDOSCOPY;  Service: Gastroenterology;  Laterality: N/A;  COLON POLYPS, DIVERTICULOSIS, HEMORRHOIDS    CYSTOSCOPY      CYSTOSCOPY RETROGRADE PYELOGRAM      ENDOSCOPY  2018    ENDOSCOPY N/A 08/28/2023    Procedure: ESOPHAGOGASTRODUODENOSCOPY WITH BIOPSIES AND ESOPHAGEAL DILATION TO 18 MM;  Surgeon: Jeanette Mcmahon MD;  Location: [facility] ENDOSCOPY;  Service: Gastroenterology;  Laterality: N/A;  ESOPHAGITIS    FOOT SURGERY      HAND SURGERY  Left     HAND SURGERY Right     HIATAL HERNIA REPAIR  05/10/2019    NECK SURGERY      TUBAL ABDOMINAL LIGATION      UPPER GASTROINTESTINAL ENDOSCOPY      VEIN SURGERY       Family History   Problem Relation Age of Onset    Stroke Mother     Hypertension Mother     Heart disease Brother     Kidney nephrosis Brother     Hypertension Maternal Grandmother     Diabetes Maternal Grandmother     Cancer Maternal Grandfather         Unspecified    Cancer Maternal Aunt     Breast cancer Maternal Aunt     Cancer Other         Unspecified    Cancer Other         Unspecified    Lung cancer Other     Colon cancer Neg Hx      Social History     Socioeconomic History    Marital status:     Number of children: 2   Tobacco Use    Smoking status: Never    Smokeless tobacco: Never   Vaping Use    Vaping status: Never Used   Substance and Sexual Activity    Alcohol use: Not Currently     Comment: former- 7-8-19    Drug use: Never    Sexual activity: Not Currently     No Known Allergies  Current Outpatient Medications   Medication Sig Dispense Refill    denosumab (Prolia) 60 MG/ML solution prefilled syringe syringe as directed subcutaneously every 6 months for 12 month(s)      estradiol (ESTRACE VAGINAL) 0.1 MG/GM vaginal cream Place 0.5 gm PV and massage 0.5 gm to vulva and vestibule at night 2x/week 42.5 g 3    Ibuprofen 3 %, Gabapentin 10 %, Baclofen 2 %, lidocaine 4 %, Ketamine HCl 4 % Apply 1-2 g topically to the appropriate area as directed 3 (Three) to 4 (Four) times daily. 90 g 0    Linzess 72 MCG capsule capsule Take 1 capsule by mouth Every Morning Before Breakfast. 90 capsule 1    meclizine (ANTIVERT) 25 MG tablet Take 1 tablet by mouth 3 (Three) Times a Day As Needed for Dizziness. 15 tablet 0    polyethylene glycol (MIRALAX) 17 GM/SCOOP powder       PreviDent 5000 Plus 1.1 % cream As Needed.      propranolol LA (INDERAL LA) 60 MG 24 hr capsule propranolol ER 60 mg capsule,24 hr,extended release      QUEtiapine  (SEROquel) 200 MG tablet 1 tab(s) orally 1 times a day at bedtime for 90 days      simethicone (Mi-Acid Gas Relief) 80 MG chewable tablet 1 tab(s) chewed 4 times a day PRN for 30 day(s)      simvastatin (ZOCOR) 20 MG tablet       Synthroid 50 MCG tablet       testosterone (Testim) 50 MG/5GM (1%) gel gel Place entirety of one tube of testim in a 5 ml syringe.  Apply 0.5 ml to back of the knee daily 15 g 5    tiZANidine (ZANAFLEX) 4 MG tablet Take 1 tablet by mouth every night at bedtime. 30 tablet 3    topiramate (Topamax) 100 MG tablet 1 tab(s) orally at bedtime for 90 days      valACYclovir (Valtrex) 500 MG tablet Take 1 tablet by mouth Daily.      Vibegron (Gemtesa) 75 MG tablet Take 1 tablet by mouth Daily for 180 days. 90 tablet 1    vilazodone (VIIBRYD) 40 MG tablet tablet Viibryd 40 mg tablet      vitamin D (ERGOCALCIFEROL) 1.25 MG (79585 UT) capsule capsule        No current facility-administered medications for this visit.     Review of Systems   Constitutional: Negative.    HENT: Negative.     Eyes: Negative.    Respiratory: Negative.     Cardiovascular: Negative.    Gastrointestinal: Negative.    Endocrine: Negative.    Genitourinary: Negative.    Musculoskeletal: Negative.         Left third toe pain   Skin: Negative.    Allergic/Immunologic: Negative.    Neurological: Negative.    Hematological: Negative.    Psychiatric/Behavioral: Negative.     All other systems reviewed and are negative.      OBJECTIVE     Vitals:    04/25/24 0923   BP: 104/75   Pulse: 60   Temp: 97.5 °F (36.4 °C)   SpO2: 98%       WBC   Date Value Ref Range Status   05/15/2023 5.92 3.40 - 10.80 10*3/mm3 Final     RBC   Date Value Ref Range Status   05/15/2023 4.46 3.77 - 5.28 10*6/mm3 Final     Hemoglobin   Date Value Ref Range Status   05/15/2023 14.4 12.0 - 15.9 g/dL Final     Hematocrit   Date Value Ref Range Status   05/15/2023 42.9 34.0 - 46.6 % Final     MCV   Date Value Ref Range Status   05/15/2023 96.2 79.0 - 97.0 fL Final      MCH   Date Value Ref Range Status   05/15/2023 32.3 26.6 - 33.0 pg Final     MCHC   Date Value Ref Range Status   05/15/2023 33.6 31.5 - 35.7 g/dL Final     RDW   Date Value Ref Range Status   05/15/2023 12.4 12.3 - 15.4 % Final     RDW-SD   Date Value Ref Range Status   05/15/2023 43.9 37.0 - 54.0 fl Final     MPV   Date Value Ref Range Status   05/15/2023 9.2 6.0 - 12.0 fL Final     Platelets   Date Value Ref Range Status   05/15/2023 277 140 - 450 10*3/mm3 Final     Neutrophil %   Date Value Ref Range Status   05/15/2023 55.9 42.7 - 76.0 % Final     Lymphocyte %   Date Value Ref Range Status   05/15/2023 34.8 19.6 - 45.3 % Final     Monocyte %   Date Value Ref Range Status   05/15/2023 5.7 5.0 - 12.0 % Final     Eosinophil %   Date Value Ref Range Status   05/15/2023 2.5 0.3 - 6.2 % Final     Basophil %   Date Value Ref Range Status   05/15/2023 0.8 0.0 - 1.5 % Final     Immature Grans %   Date Value Ref Range Status   05/15/2023 0.3 0.0 - 0.5 % Final     Neutrophils, Absolute   Date Value Ref Range Status   05/15/2023 3.30 1.70 - 7.00 10*3/mm3 Final     Lymphocytes, Absolute   Date Value Ref Range Status   05/15/2023 2.06 0.70 - 3.10 10*3/mm3 Final     Monocytes, Absolute   Date Value Ref Range Status   05/15/2023 0.34 0.10 - 0.90 10*3/mm3 Final     Eosinophils, Absolute   Date Value Ref Range Status   05/15/2023 0.15 0.00 - 0.40 10*3/mm3 Final     Basophils, Absolute   Date Value Ref Range Status   05/15/2023 0.05 0.00 - 0.20 10*3/mm3 Final     Immature Grans, Absolute   Date Value Ref Range Status   05/15/2023 0.02 0.00 - 0.05 10*3/mm3 Final     nRBC   Date Value Ref Range Status   05/15/2023 0.0 0.0 - 0.2 /100 WBC Final         Lab Results   Component Value Date    GLUCOSE 109 (H) 09/08/2023    BUN 12 09/08/2023    CREATININE 0.91 09/08/2023    BCR 13.2 09/08/2023    K 4.2 09/08/2023    CO2 27.0 09/08/2023    CALCIUM 9.9 09/08/2023    ALBUMIN 4.4 09/08/2023    LABIL2 1.6 05/18/2020    AST 15 09/08/2023     ALT 20 09/08/2023       Patient seen in no apparent distress.      PHYSICAL EXAM:     Foot/Ankle Exam    RADIOLOGY:          MRI Foot Left With & Without Contrast    Result Date: 4/23/2024  Narrative:  MRI FOOT LEFT W WO CONTRAST-  Date of Exam: 4/23/2024 8:10 AM  Indication: Bone mass or bone pain, foot, no prior imaging; M85.679-Other cyst of bone, unspecified ankle and foot.  Comparison: 4/19/2024 radiographs  Technique:  Routine multiplanar/multisequence sequence images of the left foot were obtained before and after the uneventful administration of 15 mL MultiHance.    Findings: Redemonstrated comminuted mildly displaced fracture of the third digit middle phalanx with lateral displacement of the distal fracture fragment. There is associated marrow edema at this site. There is additional edema-like marrow signal within the distal aspect of the great toe proximal phalanx. There is an associated broad band of T1 hypointensity at this site (axial T1 image 11). There is also edema-like marrow signal within the second digit proximal and middle phalanges, favored to represent bony contusion given lack of associated discrete fracture line.  There is evidence for prior surgical repair at the first metatarsal head, possibly related to hallux valgus correction and/or bony bunion resection. There is no evidence of abnormal signal at this site to suggest acute abnormality.  Regarding the previously noted ovoid lytic lesion in the shaft of the first proximal phalanx, there is no discrete abnormal marrow signal or abnormal enhancement at the site.  The visualized flexor and extensor tendons are intact. There is no acute plantar plate injury. No evidence of intermetatarsal neuroma or bursitis. Mild ill-defined subcutaneous edema along the dorsal forefoot..      Impression: Impression: No discrete abnormal marrow signal or enhancement corresponding to the ovoid lytic lesion described on recent radiograph. This could represent  an intraosseous lipoma or region of relatively decreased trabeculation. No suspicious mass.  Redemonstrated comminuted mildly displaced fracture of the third digit middle phalanx with associated marrow edema.  Additional marrow edema within the distal aspect of the great toe proximal phalanx as well as within the second digit proximal and middle phalanges, favored to represent contusive/reactive marrow edema.  There is a broad band of T1 hypointensity along the distal aspect of the first digit proximal phalanx, which could represent atypical appearance of a nondisplaced fracture line, or be a component of the adjacent surgical change seen at the first metatarsal head. Recommend correlation with surgical history and point tenderness at this site.    Electronically Signed By-Matthew Morgan MD On:4/23/2024 4:20 PM      XR Foot 3+ View Left    Result Date: 4/19/2024  Narrative: XR FOOT 3+ VW LEFT-  Date of Exam: 4/19/2024 1:09 PM  Indication: C/O left toe pain after dropping pallet on left foot  Comparison: Three-view left foot dated 1/26/2024  Findings: The fracture previously noted involving the first distal phalanx has healed. There is deformity of the distal aspect of the first metatarsal, postsurgical versus posttraumatic in etiology. There is a 1.4 cm ovoid lytic focus in the shaft of the first proximal phalanx. There appears to be some ankylosis of the second PIP joint. There is an intra-articular fracture involving the base of the third middle phalanx. There is a resulting fracture fragment measuring 0.5 cm with 0.5 cm off the base. The more distal aspect of the phalanx is displaced plantarly 0.4 cm in relation to the fragment and proximal phalanx.  Forefoot soft tissue swelling is noted.       Impression: Impression: 1.  Intra-articular fracture involving the base of the third middle phalanx, as detailed above. 2.  Postsurgical and/or posttraumatic changes of the distal first metatarsal which appear chronic. 3.   1.4 cm ovoid lytic lesion involving the shaft of the first proximal phalanx. Correlate clinically for the possibility of metastatic disease or multiple myeloma. An epidermoid or enchondroma would also be in the differential.   Electronically Signed By-Anurag Alaniz MD On:4/19/2024 1:49 PM       ASSESSMENT/PLAN     Diagnoses and all orders for this visit:    1. Closed displaced fracture of proximal phalanx of lesser toe of left foot with routine healing, subsequent encounter (Primary)    2. Bone cyst of foot    3. Foot pain, left      M RI you reviewed with patient    Continue treatment for toe fracture    Patient to begin stretching exercises and icing in the evening as tolerated. Discussed compression therapy and resting the extremity.  Anti-inflammatory medication to begin taking if okay by PCP.    Discussed findings and treatment plan including risks, benefits, and treatment options with patient in detail. Patient agreed with treatment plan.    Medications and allergies reviewed.  Reviewed available lab values along with other pertinent labs.  These were discussed with the patient.    An After Visit Summary was printed and given to the patient at discharge, including (if requested) any available informative/educational handouts regarding diagnosis, treatment, or medications. All questions were answered to patient/family satisfaction. Should symptoms fail to improve or worsen they agree to call or return to clinic or to go to the Emergency Department. Discussed the importance of following up with any needed screening tests/labs/specialist appointments and any requested follow-up recommended by me today. Importance of maintaining follow-up discussed and patient accepts that missed appointments can delay diagnosis and potentially lead to worsening of conditions.    No follow-ups on file., or sooner if acute issues arise.    This document has been electronically signed by Dain Walker DPM on April 25, 2024  11:09 EDT

## 2024-04-26 ENCOUNTER — TELEPHONE (OUTPATIENT)
Dept: NEUROLOGY | Facility: CLINIC | Age: 64
End: 2024-04-26
Payer: MEDICARE

## 2024-04-26 NOTE — PROGRESS NOTES
Procedure   Inject Trigger Points, > 3    Date/Time: 4/23/2024 4:15 PM    Performed by: Landy Lynch APRN  Authorized by: Landy Lynch APRN  Local anesthesia used: yes  Anesthesia: local infiltration    Anesthesia:  Local anesthesia used: yes  Local Anesthetic: bupivacaine 0.25% without epinephrine  Anesthetic total: 7 mL    Sedation:  Patient sedated: no    Patient tolerance: patient tolerated the procedure well with no immediate complications  Comments: Injected trigger points to bilateral cervical paraspinal and trapezius muscles       CRBTPI    Date/Time: 4/23/2024 4:15 PM    Performed by: Landy Lynch APRN  Authorized by: Landy Lynch APRN  Indications: pain relief  Body area: head (Greater and Lesser occipital nerve)  Nerve: greater occipital  Laterality: Bilateral.    Sedation:  Patient sedated: no    Preparation: Aseptic Technique.  Patient position: sitting  Needle size: 27 G  Location technique: anatomical landmarks  Local Anesthetic: bupivacaine 0.25% without epinephrine  Anesthetic total: 3 mL  Patient tolerance: Patient tolerated the procedure well with no immediate complications

## 2024-04-29 NOTE — TELEPHONE ENCOUNTER
Left voicemail for patient that Landy will be at the [facility] on the date she requested and to call back for dates she has availability.

## 2024-04-30 ENCOUNTER — TELEPHONE (OUTPATIENT)
Dept: NEUROLOGY | Facility: CLINIC | Age: 64
End: 2024-04-30
Payer: MEDICARE

## 2024-04-30 NOTE — TELEPHONE ENCOUNTER
Caller: Ann Perla     Best call back number:   Telephone Information:   Mobile [phone]     Who are you requesting to speak with (clinical staff, provider,  specific staff member): CLINICAL    Do you know the name of the person who called: CHRISTIANE    What was the call regarding: INJECTION APPOINTMENT

## 2024-05-07 ENCOUNTER — LAB (OUTPATIENT)
Dept: OBSTETRICS AND GYNECOLOGY | Facility: CLINIC | Age: 64
End: 2024-05-07
Payer: MEDICARE

## 2024-05-07 DIAGNOSIS — F52.0 HYPOACTIVE SEXUAL DESIRE DISORDER: ICD-10-CM

## 2024-05-07 DIAGNOSIS — R68.82 DECREASED LIBIDO: ICD-10-CM

## 2024-05-07 LAB — TESTOST SERPL-MCNC: 38.6 NG/DL (ref 2.9–40.8)

## 2024-05-07 PROCEDURE — 36415 COLL VENOUS BLD VENIPUNCTURE: CPT | Performed by: OBSTETRICS & GYNECOLOGY

## 2024-05-07 PROCEDURE — 84403 ASSAY OF TOTAL TESTOSTERONE: CPT | Performed by: OBSTETRICS & GYNECOLOGY

## 2024-05-17 ENCOUNTER — HOSPITAL ENCOUNTER (OUTPATIENT)
Dept: MAMMOGRAPHY | Facility: HOSPITAL | Age: 64
Discharge: HOME | End: 2024-05-17
Admitting: OBSTETRICS & GYNECOLOGY
Payer: MEDICARE

## 2024-05-17 DIAGNOSIS — Z12.31 ENCOUNTER FOR SCREENING MAMMOGRAM FOR MALIGNANT NEOPLASM OF BREAST: ICD-10-CM

## 2024-05-17 PROCEDURE — 77067 SCR MAMMO BI INCL CAD: CPT

## 2024-05-17 PROCEDURE — 77063 BREAST TOMOSYNTHESIS BI: CPT

## 2024-05-23 ENCOUNTER — OFFICE VISIT (OUTPATIENT)
Dept: PODIATRY | Facility: CLINIC | Age: 64
End: 2024-05-23
Payer: MEDICARE

## 2024-05-23 ENCOUNTER — OFFICE VISIT (OUTPATIENT)
Dept: NEUROLOGY | Facility: CLINIC | Age: 64
End: 2024-05-23
Payer: MEDICARE

## 2024-05-23 VITALS
HEART RATE: 62 BPM | BODY MASS INDEX: 23.05 KG/M2 | HEIGHT: 66 IN | SYSTOLIC BLOOD PRESSURE: 109 MMHG | DIASTOLIC BLOOD PRESSURE: 81 MMHG | WEIGHT: 143.4 LBS

## 2024-05-23 VITALS
SYSTOLIC BLOOD PRESSURE: 109 MMHG | DIASTOLIC BLOOD PRESSURE: 79 MMHG | TEMPERATURE: 97.8 F | HEIGHT: 66 IN | OXYGEN SATURATION: 97 % | HEART RATE: 61 BPM | BODY MASS INDEX: 24.75 KG/M2 | WEIGHT: 154 LBS

## 2024-05-23 DIAGNOSIS — S92.512D CLOSED DISPLACED FRACTURE OF PROXIMAL PHALANX OF LESSER TOE OF LEFT FOOT WITH ROUTINE HEALING, SUBSEQUENT ENCOUNTER: Primary | ICD-10-CM

## 2024-05-23 DIAGNOSIS — M54.81 BILATERAL OCCIPITAL NEURALGIA: Primary | ICD-10-CM

## 2024-05-23 DIAGNOSIS — M54.2 CERVICALGIA: ICD-10-CM

## 2024-05-23 DIAGNOSIS — S92.425A CLOSED NONDISPLACED FRACTURE OF DISTAL PHALANX OF LEFT GREAT TOE, INITIAL ENCOUNTER: ICD-10-CM

## 2024-05-23 PROCEDURE — 3079F DIAST BP 80-89 MM HG: CPT | Performed by: NURSE PRACTITIONER

## 2024-05-23 PROCEDURE — 3074F SYST BP LT 130 MM HG: CPT | Performed by: NURSE PRACTITIONER

## 2024-05-23 PROCEDURE — 99214 OFFICE O/P EST MOD 30 MIN: CPT | Performed by: NURSE PRACTITIONER

## 2024-05-23 RX ORDER — TIZANIDINE 4 MG/1
4 TABLET ORAL
Qty: 30 TABLET | Refills: 3 | Status: SHIPPED | OUTPATIENT
Start: 2024-05-23

## 2024-05-23 NOTE — PROGRESS NOTES
[facility] - PODIATRY    Today's Date: 05/23/24    Patient Name: Ann Perla  MRN: [id number]  [facility]: [id number]  PCP: Nory Bryan APRN,   Referring Provider: No ref. provider found    SUBJECTIVE     Chief Complaint   Patient presents with    Left Foot - Follow-up, Fracture     Doing better     HPI: Ann Perla, a 63 y.o.female, presents to clinic.    Patient is here for follow-up of her great toe.  Patient states that it is improving.  Still some swelling but overall doing better.  Past Medical History:   Diagnosis Date    Abdominal pain, LLQ 12/04/2015    Back pain     Bunion     Deep vein thrombosis     Depressive disorder     Disease of thyroid gland     Foot pain, right 04/08/2021    GERD (gastroesophageal reflux disease)     Hallux valgus of right foot 12/03/2018    Hammer toe     Herpes genitalia     History of transfusion     HLD (hyperlipidemia)     Migraine     Mixed incontinence urge and stress     OAB (overactive bladder) 01/24/2018    Seizures     Urinary urgency      Past Surgical History:   Procedure Laterality Date    CERVICAL FUSION      COLONOSCOPY  2018, 2016    COLONOSCOPY N/A 08/28/2023    Procedure: COLONOSCOPY WITH POLYPECTOMIES/HOT SNARE WITH ELEVIEW INJECTION AND CLIP APPLICATION X 2;  Surgeon: Jeanette Mcmahon MD;  Location: [facility] ENDOSCOPY;  Service: Gastroenterology;  Laterality: N/A;  COLON POLYPS, DIVERTICULOSIS, HEMORRHOIDS    CYSTOSCOPY      CYSTOSCOPY RETROGRADE PYELOGRAM      ENDOSCOPY  2018    ENDOSCOPY N/A 08/28/2023    Procedure: ESOPHAGOGASTRODUODENOSCOPY WITH BIOPSIES AND ESOPHAGEAL DILATION TO 18 MM;  Surgeon: Jeanette Mcmahon MD;  Location: [facility] ENDOSCOPY;  Service: Gastroenterology;  Laterality: N/A;  ESOPHAGITIS    FOOT SURGERY      HAND SURGERY Left     HAND SURGERY Right     HIATAL HERNIA REPAIR  05/10/2019    NECK SURGERY      TUBAL ABDOMINAL LIGATION      UPPER GASTROINTESTINAL ENDOSCOPY      VEIN SURGERY       Family  History   Problem Relation Age of Onset    Stroke Mother     Hypertension Mother     Heart disease Brother     Kidney nephrosis Brother     Hypertension Maternal Grandmother     Diabetes Maternal Grandmother     Cancer Maternal Grandfather         Unspecified    Cancer Maternal Aunt     Breast cancer Maternal Aunt     Cancer Other         Unspecified    Cancer Other         Unspecified    Lung cancer Other     Colon cancer Neg Hx      Social History     Socioeconomic History    Marital status:     Number of children: 2   Tobacco Use    Smoking status: Never    Smokeless tobacco: Never   Vaping Use    Vaping status: Never Used   Substance and Sexual Activity    Alcohol use: Not Currently     Comment: former- 7-8-19    Drug use: Never    Sexual activity: Not Currently     No Known Allergies  Current Outpatient Medications   Medication Sig Dispense Refill    denosumab (Prolia) 60 MG/ML solution prefilled syringe syringe as directed subcutaneously every 6 months for 12 month(s)      estradiol (ESTRACE VAGINAL) 0.1 MG/GM vaginal cream Place 0.5 gm PV and massage 0.5 gm to vulva and vestibule at night 2x/week 42.5 g 3    Ibuprofen 3 %, Gabapentin 10 %, Baclofen 2 %, lidocaine 4 %, Ketamine HCl 4 % Apply 1-2 g topically to the appropriate area as directed 3 (Three) to 4 (Four) times daily. 90 g 0    Linzess 72 MCG capsule capsule Take 1 capsule by mouth Every Morning Before Breakfast. 90 capsule 1    meclizine (ANTIVERT) 25 MG tablet Take 1 tablet by mouth 3 (Three) Times a Day As Needed for Dizziness. 15 tablet 0    polyethylene glycol (MIRALAX) 17 GM/SCOOP powder       PreviDent 5000 Plus 1.1 % cream As Needed.      propranolol LA (INDERAL LA) 60 MG 24 hr capsule propranolol ER 60 mg capsule,24 hr,extended release      QUEtiapine (SEROquel) 200 MG tablet 1 tab(s) orally 1 times a day at bedtime for 90 days      simethicone (Mi-Acid Gas Relief) 80 MG chewable tablet 1 tab(s) chewed 4 times a day PRN for 30 day(s)       simvastatin (ZOCOR) 20 MG tablet       Synthroid 50 MCG tablet       testosterone (Testim) 50 MG/5GM (1%) gel gel Place entirety of one tube of testim in a 5 ml syringe.  Apply 0.5 ml to back of the knee daily 15 g 5    tiZANidine (ZANAFLEX) 4 MG tablet Take 1 tablet by mouth every night at bedtime. 30 tablet 3    topiramate (Topamax) 100 MG tablet 1 tab(s) orally at bedtime for 90 days      valACYclovir (Valtrex) 500 MG tablet Take 1 tablet by mouth Daily.      Vibegron (Gemtesa) 75 MG tablet Take 1 tablet by mouth Daily for 180 days. 90 tablet 1    vilazodone (VIIBRYD) 40 MG tablet tablet Viibryd 40 mg tablet      vitamin D (ERGOCALCIFEROL) 1.25 MG (68480 UT) capsule capsule        No current facility-administered medications for this visit.     Review of Systems   Constitutional: Negative.    HENT: Negative.     Eyes: Negative.    Respiratory: Negative.     Cardiovascular: Negative.    Gastrointestinal: Negative.    Endocrine: Negative.    Genitourinary: Negative.    Musculoskeletal: Negative.         Left third toe pain   Skin: Negative.    Allergic/Immunologic: Negative.    Neurological: Negative.    Hematological: Negative.    Psychiatric/Behavioral: Negative.     All other systems reviewed and are negative.      OBJECTIVE     Vitals:    05/23/24 0830   BP: 109/79   Pulse: 61   Temp: 97.8 °F (36.6 °C)   SpO2: 97%       WBC   Date Value Ref Range Status   05/15/2023 5.92 3.40 - 10.80 10*3/mm3 Final     RBC   Date Value Ref Range Status   05/15/2023 4.46 3.77 - 5.28 10*6/mm3 Final     Hemoglobin   Date Value Ref Range Status   05/15/2023 14.4 12.0 - 15.9 g/dL Final     Hematocrit   Date Value Ref Range Status   05/15/2023 42.9 34.0 - 46.6 % Final     MCV   Date Value Ref Range Status   05/15/2023 96.2 79.0 - 97.0 fL Final     MCH   Date Value Ref Range Status   05/15/2023 32.3 26.6 - 33.0 pg Final     MCHC   Date Value Ref Range Status   05/15/2023 33.6 31.5 - 35.7 g/dL Final     RDW   Date Value Ref Range  Status   05/15/2023 12.4 12.3 - 15.4 % Final     RDW-SD   Date Value Ref Range Status   05/15/2023 43.9 37.0 - 54.0 fl Final     MPV   Date Value Ref Range Status   05/15/2023 9.2 6.0 - 12.0 fL Final     Platelets   Date Value Ref Range Status   05/15/2023 277 140 - 450 10*3/mm3 Final     Neutrophil %   Date Value Ref Range Status   05/15/2023 55.9 42.7 - 76.0 % Final     Lymphocyte %   Date Value Ref Range Status   05/15/2023 34.8 19.6 - 45.3 % Final     Monocyte %   Date Value Ref Range Status   05/15/2023 5.7 5.0 - 12.0 % Final     Eosinophil %   Date Value Ref Range Status   05/15/2023 2.5 0.3 - 6.2 % Final     Basophil %   Date Value Ref Range Status   05/15/2023 0.8 0.0 - 1.5 % Final     Immature Grans %   Date Value Ref Range Status   05/15/2023 0.3 0.0 - 0.5 % Final     Neutrophils, Absolute   Date Value Ref Range Status   05/15/2023 3.30 1.70 - 7.00 10*3/mm3 Final     Lymphocytes, Absolute   Date Value Ref Range Status   05/15/2023 2.06 0.70 - 3.10 10*3/mm3 Final     Monocytes, Absolute   Date Value Ref Range Status   05/15/2023 0.34 0.10 - 0.90 10*3/mm3 Final     Eosinophils, Absolute   Date Value Ref Range Status   05/15/2023 0.15 0.00 - 0.40 10*3/mm3 Final     Basophils, Absolute   Date Value Ref Range Status   05/15/2023 0.05 0.00 - 0.20 10*3/mm3 Final     Immature Grans, Absolute   Date Value Ref Range Status   05/15/2023 0.02 0.00 - 0.05 10*3/mm3 Final     nRBC   Date Value Ref Range Status   05/15/2023 0.0 0.0 - 0.2 /100 WBC Final         Lab Results   Component Value Date    GLUCOSE 109 (H) 09/08/2023    BUN 12 09/08/2023    CREATININE 0.91 09/08/2023    BCR 13.2 09/08/2023    K 4.2 09/08/2023    CO2 27.0 09/08/2023    CALCIUM 9.9 09/08/2023    ALBUMIN 4.4 09/08/2023    LABIL2 1.6 05/18/2020    AST 15 09/08/2023    ALT 20 09/08/2023       Patient seen in no apparent distress.      PHYSICAL EXAM:     Foot/Ankle Exam    RADIOLOGY:          Mammo Screening Digital Tomosynthesis Bilateral With  CAD    Result Date: 5/17/2024  Narrative: MAMMO SCREENING DIGITAL TOMOSYNTHESIS BILATERAL W CAD-  Date of Exam: 5/17/2024 3:54 PM  Indication: Screening.  BILATERAL SCREENING MMG; Z12.31-Encounter for screening mammogram for malignant neoplasm of breast. Patient reports prior right breast biopsy. Family history of breast cancer within patient's maternal aunts.  Comparison: Prior mammograms dated 2/22/2024, 7/7/2023, 3/24/2023, 2/24/2023, 11/15/2021, 8/21/2020  Technique: MLO and CC views of bilateral breast(s) were obtained according to routine screening breast mammography protocol with tomosynthesis.   The mammographic images were interpreted with the assistance of a computer aided detection system.  FINDINGS: There are scattered areas of fibroglandular density. There are no suspicious masses, suspicious microcalcifications, or architectural distortion in either breast. No breast arterial calcifications present.     Impression: No mammographic signs of malignancy. Recommend routine mammographic screening.  BI-RADS ASSESSMENT: BI-RADS 1. Negative.  The patient's information is entered into a computerized reminder system with a targeted due date for the next mammogram.  Note:  It has been reported that there is approximately a 15% false negative in mammography.  Therefore, management of a palpable abnormality should not be deferred because of a negative mammogram.     Electronically Signed By-Franklin Valle MD On:5/17/2024 5:18 PM       ASSESSMENT/PLAN     Diagnoses and all orders for this visit:    1. Closed displaced fracture of proximal phalanx of lesser toe of left foot with routine healing, subsequent encounter (Primary)    2. Closed nondisplaced fracture of distal phalanx of left great toe, initial encounter        Patient to continue stretching exercises and icing in the evening as tolerated. Discussed compression therapy and resting the extremity.  Anti-inflammatory medication to begin taking if okay by  PCP.    RTC in 3 months or prn    Discussed findings and treatment plan including risks, benefits, and treatment options with patient in detail. Patient agreed with treatment plan.    Medications and allergies reviewed.  Reviewed available lab values along with other pertinent labs.  These were discussed with the patient.    An After Visit Summary was printed and given to the patient at discharge, including (if requested) any available informative/educational handouts regarding diagnosis, treatment, or medications. All questions were answered to patient/family satisfaction. Should symptoms fail to improve or worsen they agree to call or return to clinic or to go to the Emergency Department. Discussed the importance of following up with any needed screening tests/labs/specialist appointments and any requested follow-up recommended by me today. Importance of maintaining follow-up discussed and patient accepts that missed appointments can delay diagnosis and potentially lead to worsening of conditions.    Return in about 3 months (around 8/23/2024)., or sooner if acute issues arise.    This document has been electronically signed by Dain Walker DPM on May 23, 2024 08:38 EDT

## 2024-05-23 NOTE — PROGRESS NOTES
"Chief Complaint  Neurologic Problem    Subjective          Ann Perla presents to [facility]  History of Present Illness  Continues to have pain in occipital region and in the neck. Has been receiving occipital nerve blocks and trigger point injections.     Interval History:   States that she's experiencing neck pain to bilateral occipital regions into the neck.  Worse with certain positions, turning the head.  Pain began about 6 months ago.  History of cervical fusion, two separate surgeries, she thinks about 5 years ago.        Objective   Vital Signs:   /81   Pulse 62   Ht 167.6 cm (66\")   Wt 65 kg (143 lb 6.4 oz)   BMI 23.15 kg/m²     Physical Exam  HENT:      Head: Normocephalic.   Neck:      Comments: Tenderness over bilateral occipital regions   Pulmonary:      Effort: Pulmonary effort is normal.   Musculoskeletal:      Cervical back: Muscular tenderness present. Decreased range of motion.   Neurological:      Mental Status: She is alert and oriented to person, place, and time.      Sensory: Sensation is intact.      Motor: Motor function is intact.      Coordination: Coordination is intact.      Deep Tendon Reflexes: Reflexes are normal and symmetric.        Neurologic Exam     Mental Status   Oriented to person, place, and time.        Result Review :               Assessment and Plan    Diagnoses and all orders for this visit:    1. Bilateral occipital neuralgia (Primary)  Assessment & Plan:  Will order bilateral occipital nerve blocks.         2. Cervicalgia  Assessment & Plan:  Will order trigger point injections to bilateral cervical paraspinal and trapezius muscles. Will send in neuropathic topical cream. Continue tizanidine.       Other orders  -     tiZANidine (ZANAFLEX) 4 MG tablet; Take 1 tablet by mouth every night at bedtime. (Patient not taking: Reported on 5/28/2024)  Dispense: 30 tablet; Refill: 3        Follow Up   Return in about " 6 months (around 11/23/2024) for occipital neuralgia .  Patient was given instructions and counseling regarding her condition or for health maintenance advice. Please see specific information pulled into the AVS if appropriate.

## 2024-05-28 ENCOUNTER — OFFICE VISIT (OUTPATIENT)
Dept: ORTHOPEDIC SURGERY | Facility: CLINIC | Age: 64
End: 2024-05-28
Payer: MEDICARE

## 2024-05-28 VITALS
DIASTOLIC BLOOD PRESSURE: 76 MMHG | WEIGHT: 143 LBS | OXYGEN SATURATION: 95 % | HEART RATE: 63 BPM | HEIGHT: 66 IN | SYSTOLIC BLOOD PRESSURE: 109 MMHG | BODY MASS INDEX: 22.98 KG/M2

## 2024-05-28 DIAGNOSIS — M25.531 RIGHT WRIST PAIN: Primary | ICD-10-CM

## 2024-05-28 DIAGNOSIS — R20.2 PARESTHESIA: ICD-10-CM

## 2024-05-28 RX ORDER — ALENDRONATE SODIUM 70 MG/1
TABLET ORAL
COMMUNITY
Start: 2024-05-23 | End: 2024-05-31

## 2024-05-28 NOTE — PROGRESS NOTES
"Chief Complaint  Initial Evaluation of the Right Wrist     Subjective      Ann Perla presents to [facility] for an evaluation of her right wrist. She reports she had carpal tunnel surgery done fourteen years ago. She reports pain when lifting her arm up. She has been wearing a brace and states she is still having pain. She reports pain to the ulnar wrist. She reports occasional numbness or tingling. She doesn't recall any recent injury, falls or trauma to her wrist.      No Known Allergies     Social History     Socioeconomic History    Marital status:     Number of children: 2   Tobacco Use    Smoking status: Never    Smokeless tobacco: Never   Vaping Use    Vaping status: Never Used   Substance and Sexual Activity    Alcohol use: Not Currently     Comment: former- 7-8-19    Drug use: Never    Sexual activity: Not Currently        I reviewed the patient's chief complaint, history of present illness, review of systems, past medical history, surgical history, family history, social history, medications, and allergy list.     Review of Systems     Constitutional: Denies fevers, chills, weight loss  Cardiovascular: Denies chest pain, shortness of breath  Skin: Denies rashes, acute skin changes  Neurologic: Denies headache, loss of consciousness  MSK: Right wrist pain       Vital Signs:   /76   Pulse 63   Ht 167.6 cm (66\")   Wt 64.9 kg (143 lb)   SpO2 95%   BMI 23.08 kg/m²            Ortho Exam    Physical Exam  General:Alert. No acute distress   Right upper extremity: well healed scars to the ulnar wrist, carpal tunnel and cubital elbow, mild swelling, tender to the ulnar wrist, extension to 80 degrees, flexion 75, supination and pronation good, mild atrophy, sensation intact to the medial, radial and ulnar nerve, neurovascularly intact     Procedures    X-Ray Report:  Right wrist  X-Ray  Indication: Evaluation of right wrist pain   AP/Lateral " view(s)  Findings: heterotopic ossification and post-operative changes to the distal ulnar region, no acute fracture, moderate CMC changes   Prior studies available for comparison: no       Imaging Results (Most Recent)       Procedure Component Value Units Date/Time    XR Wrist 2 View Right [[id number]] Resulted: 05/28/24 0906     Updated: 05/28/24 0908             Result Review :       Mammo Screening Digital Tomosynthesis Bilateral With CAD    Result Date: 5/17/2024  Narrative: MAMMO SCREENING DIGITAL TOMOSYNTHESIS BILATERAL W CAD-  Date of Exam: 5/17/2024 3:54 PM  Indication: Screening.  BILATERAL SCREENING MMG; Z12.31-Encounter for screening mammogram for malignant neoplasm of breast. Patient reports prior right breast biopsy. Family history of breast cancer within patient's maternal aunts.  Comparison: Prior mammograms dated 2/22/2024, 7/7/2023, 3/24/2023, 2/24/2023, 11/15/2021, 8/21/2020  Technique: MLO and CC views of bilateral breast(s) were obtained according to routine screening breast mammography protocol with tomosynthesis.   The mammographic images were interpreted with the assistance of a computer aided detection system.  FINDINGS: There are scattered areas of fibroglandular density. There are no suspicious masses, suspicious microcalcifications, or architectural distortion in either breast. No breast arterial calcifications present.     Impression: No mammographic signs of malignancy. Recommend routine mammographic screening.  BI-RADS ASSESSMENT: BI-RADS 1. Negative.  The patient's information is entered into a computerized reminder system with a targeted due date for the next mammogram.  Note:  It has been reported that there is approximately a 15% false negative in mammography.  Therefore, management of a palpable abnormality should not be deferred because of a negative mammogram.     Electronically Signed By-Franklin Valle MD On:5/17/2024 5:18 PM              Assessment and Plan     Diagnoses and  all orders for this visit:    1. Right wrist pain (Primary)  -     XR Wrist 2 View Right    2. right wrist paresthesia        The patient presents here today for an evaluation of her right wrist. X-rays were obtained in the office today and these were reviewed today.     EMG and an MRI ordered on the patient today.     She is not able to take anti inflammatories due to kidney problems.     Carpal tunnel brace given to the patient today.     Call or return if worsening symptoms.    Follow Up     After EMG and MRI     Patient was given instructions and counseling regarding her condition or for health maintenance advice. Please see specific information pulled into the AVS if appropriate.     Scribed for Camron Hua MD by Abigail Gilbert.  05/28/24   09:31 EDT    I have personally performed the services described in this document as scribed by the above individual and it is both accurate and complete. Camron Hua MD 05/29/24

## 2024-05-28 NOTE — ASSESSMENT & PLAN NOTE
Will order trigger point injections to bilateral cervical paraspinal and trapezius muscles. Will send in neuropathic topical cream. Continue tizanidine.

## 2024-05-30 ENCOUNTER — PROCEDURE VISIT (OUTPATIENT)
Dept: NEUROLOGY | Facility: CLINIC | Age: 64
End: 2024-05-30
Payer: MEDICARE

## 2024-05-30 ENCOUNTER — PATIENT ROUNDING (BHMG ONLY) (OUTPATIENT)
Dept: ORTHOPEDIC SURGERY | Facility: CLINIC | Age: 64
End: 2024-05-30
Payer: MEDICARE

## 2024-05-30 DIAGNOSIS — M54.81 BILATERAL OCCIPITAL NEURALGIA: Primary | ICD-10-CM

## 2024-05-30 DIAGNOSIS — M54.2 CERVICALGIA: ICD-10-CM

## 2024-05-30 RX ORDER — BUPIVACAINE HYDROCHLORIDE 2.5 MG/ML
10 INJECTION, SOLUTION INFILTRATION; PERINEURAL ONCE
Status: COMPLETED | OUTPATIENT
Start: 2024-05-30 | End: 2024-05-30

## 2024-05-30 RX ADMIN — BUPIVACAINE HYDROCHLORIDE 10 ML: 2.5 INJECTION, SOLUTION INFILTRATION; PERINEURAL at 16:25

## 2024-05-30 NOTE — PROGRESS NOTES
A [employer] message has been sent to the patient for PATIENT ROUNDING with [facility].

## 2024-05-31 ENCOUNTER — OFFICE VISIT (OUTPATIENT)
Dept: INTERNAL MEDICINE | Facility: CLINIC | Age: 64
End: 2024-05-31
Payer: MEDICARE

## 2024-05-31 VITALS
TEMPERATURE: 97.5 F | BODY MASS INDEX: 23.24 KG/M2 | DIASTOLIC BLOOD PRESSURE: 62 MMHG | SYSTOLIC BLOOD PRESSURE: 108 MMHG | WEIGHT: 144.6 LBS | HEIGHT: 66 IN | HEART RATE: 63 BPM | OXYGEN SATURATION: 100 %

## 2024-05-31 DIAGNOSIS — R73.09 ELEVATED GLUCOSE: ICD-10-CM

## 2024-05-31 DIAGNOSIS — R41.89 BRAIN FOG: ICD-10-CM

## 2024-05-31 DIAGNOSIS — Z13.220 SCREENING, LIPID: ICD-10-CM

## 2024-05-31 DIAGNOSIS — M81.0 OSTEOPOROSIS, UNSPECIFIED OSTEOPOROSIS TYPE, UNSPECIFIED PATHOLOGICAL FRACTURE PRESENCE: ICD-10-CM

## 2024-05-31 DIAGNOSIS — F33.0 MAJOR DEPRESSIVE DISORDER, RECURRENT, MILD: Primary | ICD-10-CM

## 2024-05-31 DIAGNOSIS — F41.1 GAD (GENERALIZED ANXIETY DISORDER): ICD-10-CM

## 2024-05-31 DIAGNOSIS — R51.9 NONINTRACTABLE HEADACHE, UNSPECIFIED CHRONICITY PATTERN, UNSPECIFIED HEADACHE TYPE: ICD-10-CM

## 2024-05-31 DIAGNOSIS — F06.4 ANXIETY DISORDER DUE TO KNOWN PHYSIOLOGICAL CONDITION: ICD-10-CM

## 2024-05-31 DIAGNOSIS — K21.9 GASTROESOPHAGEAL REFLUX DISEASE, UNSPECIFIED WHETHER ESOPHAGITIS PRESENT: ICD-10-CM

## 2024-05-31 LAB
ALBUMIN SERPL-MCNC: 4.7 G/DL (ref 3.5–5.2)
ALBUMIN/GLOB SERPL: 1.7 G/DL
ALP SERPL-CCNC: 109 U/L (ref 39–117)
ALT SERPL W P-5'-P-CCNC: 16 U/L (ref 1–33)
ANION GAP SERPL CALCULATED.3IONS-SCNC: 12.2 MMOL/L (ref 5–15)
AST SERPL-CCNC: 20 U/L (ref 1–32)
BASOPHILS # BLD AUTO: 0.05 10*3/MM3 (ref 0–0.2)
BASOPHILS NFR BLD AUTO: 0.9 % (ref 0–1.5)
BILIRUB SERPL-MCNC: 0.4 MG/DL (ref 0–1.2)
BUN SERPL-MCNC: 12 MG/DL (ref 8–23)
BUN/CREAT SERPL: 11.9 (ref 7–25)
CALCIUM SPEC-SCNC: 10 MG/DL (ref 8.6–10.5)
CHLORIDE SERPL-SCNC: 105 MMOL/L (ref 98–107)
CHOLEST SERPL-MCNC: 133 MG/DL (ref 0–200)
CO2 SERPL-SCNC: 24.8 MMOL/L (ref 22–29)
CREAT SERPL-MCNC: 1.01 MG/DL (ref 0.57–1)
DEPRECATED RDW RBC AUTO: 42.9 FL (ref 37–54)
EGFRCR SERPLBLD CKD-EPI 2021: 62.7 ML/MIN/1.73
EOSINOPHIL # BLD AUTO: 0.19 10*3/MM3 (ref 0–0.4)
EOSINOPHIL NFR BLD AUTO: 3.5 % (ref 0.3–6.2)
ERYTHROCYTE [DISTWIDTH] IN BLOOD BY AUTOMATED COUNT: 12.6 % (ref 12.3–15.4)
GLOBULIN UR ELPH-MCNC: 2.8 GM/DL
GLUCOSE SERPL-MCNC: 97 MG/DL (ref 65–99)
HBA1C MFR BLD: 5.6 % (ref 4.8–5.6)
HCT VFR BLD AUTO: 40.1 % (ref 34–46.6)
HDLC SERPL-MCNC: 45 MG/DL (ref 40–60)
HGB BLD-MCNC: 13.2 G/DL (ref 12–15.9)
IMM GRANULOCYTES # BLD AUTO: 0.01 10*3/MM3 (ref 0–0.05)
IMM GRANULOCYTES NFR BLD AUTO: 0.2 % (ref 0–0.5)
LDLC SERPL CALC-MCNC: 65 MG/DL (ref 0–100)
LDLC/HDLC SERPL: 1.36 {RATIO}
LYMPHOCYTES # BLD AUTO: 1.7 10*3/MM3 (ref 0.7–3.1)
LYMPHOCYTES NFR BLD AUTO: 31.6 % (ref 19.6–45.3)
MCH RBC QN AUTO: 30.8 PG (ref 26.6–33)
MCHC RBC AUTO-ENTMCNC: 32.9 G/DL (ref 31.5–35.7)
MCV RBC AUTO: 93.7 FL (ref 79–97)
MONOCYTES # BLD AUTO: 0.34 10*3/MM3 (ref 0.1–0.9)
MONOCYTES NFR BLD AUTO: 6.3 % (ref 5–12)
NEUTROPHILS NFR BLD AUTO: 3.09 10*3/MM3 (ref 1.7–7)
NEUTROPHILS NFR BLD AUTO: 57.5 % (ref 42.7–76)
NRBC BLD AUTO-RTO: 0 /100 WBC (ref 0–0.2)
PLATELET # BLD AUTO: 266 10*3/MM3 (ref 140–450)
PMV BLD AUTO: 10.6 FL (ref 6–12)
POTASSIUM SERPL-SCNC: 4.3 MMOL/L (ref 3.5–5.2)
PROT SERPL-MCNC: 7.5 G/DL (ref 6–8.5)
RBC # BLD AUTO: 4.28 10*6/MM3 (ref 3.77–5.28)
SODIUM SERPL-SCNC: 142 MMOL/L (ref 136–145)
TRIGL SERPL-MCNC: 133 MG/DL (ref 0–150)
TSH SERPL DL<=0.05 MIU/L-ACNC: 0.72 UIU/ML (ref 0.27–4.2)
VLDLC SERPL-MCNC: 23 MG/DL (ref 5–40)
WBC NRBC COR # BLD AUTO: 5.38 10*3/MM3 (ref 3.4–10.8)

## 2024-05-31 PROCEDURE — 83036 HEMOGLOBIN GLYCOSYLATED A1C: CPT | Performed by: NURSE PRACTITIONER

## 2024-05-31 PROCEDURE — 80053 COMPREHEN METABOLIC PANEL: CPT | Performed by: NURSE PRACTITIONER

## 2024-05-31 PROCEDURE — G2211 COMPLEX E/M VISIT ADD ON: HCPCS | Performed by: NURSE PRACTITIONER

## 2024-05-31 PROCEDURE — 85025 COMPLETE CBC W/AUTO DIFF WBC: CPT | Performed by: NURSE PRACTITIONER

## 2024-05-31 PROCEDURE — 80061 LIPID PANEL: CPT | Performed by: NURSE PRACTITIONER

## 2024-05-31 PROCEDURE — 99204 OFFICE O/P NEW MOD 45 MIN: CPT | Performed by: NURSE PRACTITIONER

## 2024-05-31 PROCEDURE — 84443 ASSAY THYROID STIM HORMONE: CPT | Performed by: NURSE PRACTITIONER

## 2024-05-31 PROCEDURE — 1125F AMNT PAIN NOTED PAIN PRSNT: CPT | Performed by: NURSE PRACTITIONER

## 2024-05-31 RX ORDER — ERENUMAB-AOOE 70 MG/ML
70 INJECTION SUBCUTANEOUS ONCE
Qty: 1 ML | Refills: 0 | Status: SHIPPED | OUTPATIENT
Start: 2024-05-31 | End: 2024-05-31

## 2024-05-31 RX ORDER — OMEPRAZOLE 40 MG/1
40 CAPSULE, DELAYED RELEASE ORAL DAILY
Qty: 90 CAPSULE | Refills: 0 | Status: SHIPPED | OUTPATIENT
Start: 2024-05-31

## 2024-05-31 RX ORDER — LIFITEGRAST 50 MG/ML
1 SOLUTION/ DROPS OPHTHALMIC 2 TIMES DAILY
COMMUNITY

## 2024-05-31 NOTE — PROGRESS NOTES
"Chief Complaint  Establish Care    Subjective          Ann Perla presents to [facility]  History of Present Illness  Establish care visit  Previous pcp-Dr. Green/nilsa hanna    Cardiology-was seeing checo molina  Unknown why she was seeing him    She reports having a hiatal hernia that was repaired (Len). Has ongoing reflux.  Not currently taking anything currently  Sees Dr. Mcmahon for this and constipatio    Osteoporosis-  Tolerated prolia well but has been off of this for about 1 year    She has been seeing Dr. Trejo for decreased libido    Patient has been erroneously marked as diabetic. Based on the available clinical information, she does not have diabetes and should therefore be excluded from diabetic health maintenance and quality measures for the remainder of the reporting period.     Migraines-previously saw neurology. Has been seeing pcp for this. On inderal and topamax. Has severe brainfog  Taking tylenol 1-2x per week but tolerable  Previously tried imitrex  Objective   Vital Signs:   /62 (BP Location: Left arm, Patient Position: Sitting, Cuff Size: Adult)   Pulse 63   Temp 97.5 °F (36.4 °C)   Ht 167.6 cm (66\")   Wt 65.6 kg (144 lb 9.6 oz)   SpO2 100%   BMI 23.34 kg/m²     Physical Exam  Vitals and nursing note reviewed.   Constitutional:       General: She is not in acute distress.     Appearance: Normal appearance.   HENT:      Head: Normocephalic and atraumatic.      Right Ear: Tympanic membrane, ear canal and external ear normal.      Left Ear: Tympanic membrane, ear canal and external ear normal.      Nose: Nose normal.      Mouth/Throat:      Mouth: Mucous membranes are moist.      Pharynx: No posterior oropharyngeal erythema.   Eyes:      Conjunctiva/sclera: Conjunctivae normal.   Cardiovascular:      Rate and Rhythm: Normal rate and regular rhythm.      Pulses: Normal pulses.      Heart sounds: Normal heart sounds. No " murmur heard.     No friction rub. No gallop.   Pulmonary:      Effort: Pulmonary effort is normal. No respiratory distress.      Breath sounds: No wheezing, rhonchi or rales.   Musculoskeletal:      Cervical back: Neck supple.      Right lower leg: No edema.      Left lower leg: No edema.   Skin:     General: Skin is warm and dry.   Neurological:      General: No focal deficit present.      Mental Status: She is alert and oriented to person, place, and time.   Psychiatric:         Mood and Affect: Mood normal.         Behavior: Behavior normal.        Result Review :          Procedures      Assessment and Plan    Diagnoses and all orders for this visit:    1. Major depressive disorder, recurrent, mild (Primary)    2. NELLA (generalized anxiety disorder)    3. Elevated glucose  -     Hemoglobin A1c    4. Screening, lipid  -     Lipid Panel    5. Osteoporosis, unspecified osteoporosis type, unspecified pathological fracture presence  Comments:  Will try Prolia again since she is previously done well.  Updating DEXA.  Orders:  -     DEXA Bone Density Axial; Future    6. Gastroesophageal reflux disease, unspecified whether esophagitis present  Comments:  Will try omeprazole 40 mg x 2 to 3 months.  Will reassess at follow-up.  Discussed low acid diet.  Orders:  -     CBC & Differential  -     Comprehensive Metabolic Panel  -     Hemoglobin A1c    7. Nonintractable headache, unspecified chronicity pattern, unspecified headache type  Comments:  Will try Aimovig.  Risk benefits discussed.  Will wean Topamax after starting Aimovig with goal of stopping.  Orders:  -     CBC & Differential  -     Comprehensive Metabolic Panel  -     Ambulatory Referral to [facility] For Infusion Treatment    8. Brain fog  Comments:  Likely secondary to Topamax.  Plan to wean as above.  Checking labs today.  Orders:  -     CBC & Differential  -     Comprehensive Metabolic Panel  -     TSH    9. Anxiety disorder due to known physiological  condition  Comments:  Well-controlled.  Continue current meds  Orders:  -     TSH    10. Osteoporosis, unspecified osteoporosis type, unspecified pathological fracture presence  -     DEXA Bone Density Axial; Future    Other orders  -     omeprazole (priLOSEC) 40 MG capsule; Take 1 capsule by mouth Daily.  Dispense: 90 capsule; Refill: 0  -     Erenumab-aooe (Aimovig) 70 MG/ML auto-injector; Inject 1 mL under the skin into the appropriate area as directed 1 (One) Time for 1 dose.  Dispense: 1 mL; Refill: 0  -     denosumab (PROLIA) syringe 60 mg              Follow Up   Return in about 4 weeks (around 6/28/2024) for Medicare Wellness.  Patient was given instructions and counseling regarding her condition or for health maintenance advice. Please see specific information pulled into the AVS if appropriate.

## 2024-05-31 NOTE — PROGRESS NOTES
Procedure   Inject Trigger Points, > 3    Date/Time: 5/30/2024 4:00 PM    Performed by: Landy Lynch APRN  Authorized by: Landy Lynch APRN  Local anesthesia used: yes  Anesthesia: local infiltration    Anesthesia:  Local anesthesia used: yes  Local Anesthetic: bupivacaine 0.25% without epinephrine  Anesthetic total: 7 mL    Sedation:  Patient sedated: no    Patient tolerance: patient tolerated the procedure well with no immediate complications  Comments: Injected trigger points to bilateral cervical paraspinal and trapezius muscles       CRBTPI    Date/Time: 5/30/2024 4:00 PM    Performed by: Landy Lynch APRN  Authorized by: Landy Lynch APRN  Indications: pain relief  Body area: head (Greater and Lesser occipital nerve)  Nerve: greater occipital  Laterality: Bilateral.    Sedation:  Patient sedated: no    Preparation: Aseptic Technique.  Patient position: sitting  Needle size: 27 G  Location technique: anatomical landmarks  Local Anesthetic: bupivacaine 0.25% without epinephrine  Anesthetic total: 3 mL  Patient tolerance: Patient tolerated the procedure well with no immediate complications

## 2024-06-03 ENCOUNTER — TELEPHONE (OUTPATIENT)
Dept: INTERNAL MEDICINE | Facility: CLINIC | Age: 64
End: 2024-06-03
Payer: MEDICARE

## 2024-06-03 NOTE — TELEPHONE ENCOUNTER
Caller: Ann Perla    Relationship: Self    Best call back number: [phone]       What was the call regarding:       THE PATIENT SAID THAT A PA IS NEEDED FOR  AIMOVIG   INJECTION.     THE PATIENT IS REQUESTING  A CALL TO BE UPDATED ON THE PA     [facility] [address] [phone] [place] [phone]  [phone]

## 2024-06-06 ENCOUNTER — PATIENT ROUNDING (BHMG ONLY) (OUTPATIENT)
Dept: INTERNAL MEDICINE | Facility: CLINIC | Age: 64
End: 2024-06-06
Payer: MEDICARE

## 2024-06-11 ENCOUNTER — TELEPHONE (OUTPATIENT)
Dept: INTERNAL MEDICINE | Facility: CLINIC | Age: 64
End: 2024-06-11
Payer: MEDICARE

## 2024-06-11 NOTE — TELEPHONE ENCOUNTER
Patient called into office inquiring about her referral for Prolia.  Patient had previously received injection / infusion from [facility].  New referral placed on 5/31/2024 and patient would like update on when she can call and get that scheduled.  Messaged referral department to provide patient update.

## 2024-06-11 NOTE — TELEPHONE ENCOUNTER
Caller: Ann Perla    Relationship: Self    Best call back number:     [phone]       What medication are you requesting: NEW MIGRAINE MEDICATION    What are your current symptoms: MIGRAINE    How long have you been experiencing symptoms: YEARS    Have you had these symptoms before:    [x] Yes  [] No    Have you been treated for these symptoms before:   [x] Yes  [] No    If a prescription is needed, what is your preferred pharmacy and phone number: [facility] - [place] [phone] [facility] [phone]       Additional notes:PATIENT STATES THAT THE PHARMACY DOES NOT WANT TO FILLE THE PRESCRIPTION FORgalcanezumab-gnlm (EMGALITY) 120 MG/ML auto-injector pen [640508]    SO SHE NEEDS A NEW MEDICATION SENT IN

## 2024-06-11 NOTE — TELEPHONE ENCOUNTER
Called and spoke with pt. Explained to pt I had called [facility] and asked about the medication, [facility] stated they had the medication  but it didn't look like it had been ran through insurance yet, pharmacy ran medication through and got the approval and stated they will get working on the medication for the pt, called and spoke with pt explained to pt per pharmacy they would work on the medication and inform pt when the medication was ready to be picked up, pt stated she was not requesting a new medication pt stated she was told the medication needed a PA, explained to pt what pharmacy had stated, pt was ok with the medication and had no further questions at this time.

## 2024-06-13 ENCOUNTER — PROCEDURE VISIT (OUTPATIENT)
Dept: NEUROLOGY | Facility: CLINIC | Age: 64
End: 2024-06-13
Payer: MEDICARE

## 2024-06-13 DIAGNOSIS — M54.81 BILATERAL OCCIPITAL NEURALGIA: Primary | ICD-10-CM

## 2024-06-13 DIAGNOSIS — M54.2 CERVICALGIA: ICD-10-CM

## 2024-06-13 PROCEDURE — 20553 NJX 1/MLT TRIGGER POINTS 3/>: CPT | Performed by: NURSE PRACTITIONER

## 2024-06-13 PROCEDURE — 64405 NJX AA&/STRD GR OCPL NRV: CPT | Performed by: NURSE PRACTITIONER

## 2024-06-13 RX ORDER — BUPIVACAINE HYDROCHLORIDE 2.5 MG/ML
10 INJECTION, SOLUTION INFILTRATION; PERINEURAL ONCE
Status: COMPLETED | OUTPATIENT
Start: 2024-06-13 | End: 2024-06-13

## 2024-06-13 RX ADMIN — BUPIVACAINE HYDROCHLORIDE 10 ML: 2.5 INJECTION, SOLUTION INFILTRATION; PERINEURAL at 16:04

## 2024-06-13 NOTE — PROGRESS NOTES
Procedure   Inject Trigger Points, > 3    Date/Time: 6/13/2024 4:07 PM    Performed by: Landy Lynch APRN  Authorized by: Landy Lynch APRN  Local anesthesia used: yes  Anesthesia: local infiltration    Anesthesia:  Local anesthesia used: yes  Local Anesthetic: bupivacaine 0.25% without epinephrine  Anesthetic total: 7 mL    Sedation:  Patient sedated: no    Patient tolerance: patient tolerated the procedure well with no immediate complications  Comments: Injected trigger points to bilateral cervical paraspinal and trapezius muscles       CRBTPI    Date/Time: 6/13/2024 4:08 PM    Performed by: Landy Lynch APRN  Authorized by: Landy Lynch APRN  Indications: pain relief  Body area: head (Greater and Lesser occipital nerve)  Nerve: greater occipital  Laterality: Bilateral.    Sedation:  Patient sedated: no    Preparation: Aseptic Technique.  Patient position: sitting  Needle size: 27 G  Location technique: anatomical landmarks  Local Anesthetic: bupivacaine 0.25% without epinephrine  Anesthetic total: 3 mL  Patient tolerance: Patient tolerated the procedure well with no immediate complications

## 2024-06-13 NOTE — PROGRESS NOTES
"GYN Visit    CC: HSDD    HPI:   63 y.o. Contraception or HRT: Post menopausal, using no HRT    Pt started taking transdermal testosterone 2 months earlier and states she has not noticed a significant increase in libido.  She has been treating her GSM for quite some time and states she still has some dyspareunia.      History: PMHx, Meds, Allergies, PSHx, Social Hx, and POBHx all reviewed and updated.  Physical Exam   PHYSICAL EXAM:  /70   Pulse 61   Ht 167.6 cm (66\")   Wt 64 kg (141 lb)   LMP  (LMP Unknown)   Breastfeeding No   BMI 22.76 kg/m²   General- NAD, alert and oriented, appropriate  Psych- Normal mood, good memory  ASSESSMENT AND PLAN:  Diagnoses and all orders for this visit:    1. Hypoactive sexual desire disorder (Primary)  Assessment & Plan:  Psychological condition is  improving slightly .  Continue current treatment regimen.  Check current total testosterone levels  Psychological condition  will be reassessed  offered to the patient PFPT to help with dyspareunia which in turn can help with libido .    Orders:  -     Testosterone (Free & Total), LC / MS; Future  -     Testosterone (Free & Total), LC / MS    2. Genitourinary syndrome of menopause  Assessment & Plan:  Patient continues to have some pain despite vulvovaginal estrogen.  I recommend she add vaginal moisturizers to use in between doses of localized vaginal estrogen          Counseling:         Follow Up:  Return in about 3 months (around 2024).          Hemalatha Trejo MD  2024      "

## 2024-06-14 ENCOUNTER — HOSPITAL ENCOUNTER (OUTPATIENT)
Dept: BONE DENSITY | Facility: HOSPITAL | Age: 64
Discharge: HOME | End: 2024-06-14
Payer: MEDICARE

## 2024-06-14 DIAGNOSIS — M81.0 OSTEOPOROSIS, UNSPECIFIED OSTEOPOROSIS TYPE, UNSPECIFIED PATHOLOGICAL FRACTURE PRESENCE: ICD-10-CM

## 2024-06-14 PROCEDURE — 77080 DXA BONE DENSITY AXIAL: CPT

## 2024-06-19 ENCOUNTER — OFFICE VISIT (OUTPATIENT)
Dept: OBSTETRICS AND GYNECOLOGY | Facility: CLINIC | Age: 64
End: 2024-06-19
Payer: MEDICARE

## 2024-06-19 VITALS
HEART RATE: 61 BPM | SYSTOLIC BLOOD PRESSURE: 106 MMHG | DIASTOLIC BLOOD PRESSURE: 70 MMHG | BODY MASS INDEX: 22.66 KG/M2 | HEIGHT: 66 IN | WEIGHT: 141 LBS

## 2024-06-19 DIAGNOSIS — N95.8 GENITOURINARY SYNDROME OF MENOPAUSE: ICD-10-CM

## 2024-06-19 DIAGNOSIS — F52.0 HYPOACTIVE SEXUAL DESIRE DISORDER: Primary | ICD-10-CM

## 2024-06-19 PROCEDURE — 84403 ASSAY OF TOTAL TESTOSTERONE: CPT | Performed by: OBSTETRICS & GYNECOLOGY

## 2024-06-19 PROCEDURE — 84402 ASSAY OF FREE TESTOSTERONE: CPT | Performed by: OBSTETRICS & GYNECOLOGY

## 2024-06-19 NOTE — ASSESSMENT & PLAN NOTE
Psychological condition is  improving slightly .  Continue current treatment regimen.  Check current total testosterone levels  Psychological condition  will be reassessed  offered to the patient PFPT to help with dyspareunia which in turn can help with libido .

## 2024-06-19 NOTE — ASSESSMENT & PLAN NOTE
Patient continues to have some pain despite vulvovaginal estrogen.  I recommend she add vaginal moisturizers to use in between doses of localized vaginal estrogen

## 2024-06-20 ENCOUNTER — TELEPHONE (OUTPATIENT)
Dept: ORTHOPEDIC SURGERY | Facility: CLINIC | Age: 64
End: 2024-06-20
Payer: MEDICARE

## 2024-06-20 NOTE — TELEPHONE ENCOUNTER
Caller: KRITI   Relationship to Patient: MARLENE BUENO    Phone Number: [phone]  Reason for Call: CALLING STATING THAT THE PATIENTS ANTHEM DENIED THE MRI CPT 74148 WAS DENIED DUE TO BEING CHECKED FOR LIGAMENT INJURY OF THE RIGHT HAND BUT SHOULD ONLY BE USED AFTER TREATMENT TRIAL SUCH AS MEDICATION DN THERAPY AND OR HOME EXERCISES AND BE SEEN FOR FOLLOW UP AFTER TREATMENT- TO SCHEDULE PEER TO PEER [phone]

## 2024-06-24 ENCOUNTER — OFFICE VISIT (OUTPATIENT)
Dept: PODIATRY | Facility: CLINIC | Age: 64
End: 2024-06-24
Payer: MEDICARE

## 2024-06-24 VITALS
HEART RATE: 62 BPM | TEMPERATURE: 98.4 F | OXYGEN SATURATION: 95 % | BODY MASS INDEX: 23.08 KG/M2 | WEIGHT: 143 LBS | SYSTOLIC BLOOD PRESSURE: 110 MMHG | DIASTOLIC BLOOD PRESSURE: 74 MMHG

## 2024-06-24 DIAGNOSIS — S92.512D CLOSED DISPLACED FRACTURE OF PROXIMAL PHALANX OF LESSER TOE OF LEFT FOOT WITH ROUTINE HEALING, SUBSEQUENT ENCOUNTER: Primary | ICD-10-CM

## 2024-06-24 DIAGNOSIS — S92.425D CLOSED NONDISPLACED FRACTURE OF DISTAL PHALANX OF LEFT GREAT TOE WITH ROUTINE HEALING, SUBSEQUENT ENCOUNTER: ICD-10-CM

## 2024-06-24 PROCEDURE — 99213 OFFICE O/P EST LOW 20 MIN: CPT | Performed by: PODIATRIST

## 2024-06-24 PROCEDURE — 1159F MED LIST DOCD IN RCRD: CPT | Performed by: PODIATRIST

## 2024-06-24 PROCEDURE — 1160F RVW MEDS BY RX/DR IN RCRD: CPT | Performed by: PODIATRIST

## 2024-06-24 NOTE — PROGRESS NOTES
[facility] - PODIATRY    Today's Date: 06/24/24    Patient Name: Ann Perla  MRN: [id number]  CSN: [id number]  PCP: Usha Bonilla APRN,   Referring Provider: No ref. provider found    SUBJECTIVE     Chief Complaint   Patient presents with    Left Foot - Follow-up, Fracture     Closed displaced fracture of proximal phalanx of lesser toe      HPI: Ann Perla, a 63 y.o.female, presents to clinic.    Patient is here for follow-up of her great toe.  Doing well no pain.    Past Medical History:   Diagnosis Date    Abdominal pain, LLQ 12/04/2015    Back pain     Bunion     Deep vein thrombosis     Depressive disorder     Disease of thyroid gland     Foot pain, right 04/08/2021    GERD (gastroesophageal reflux disease)     Hallux valgus of right foot 12/03/2018    Hammer toe     Herpes genitalia     History of transfusion     HLD (hyperlipidemia)     Migraine     Mixed incontinence urge and stress     OAB (overactive bladder) 01/24/2018    Seizures     Urinary urgency      Past Surgical History:   Procedure Laterality Date    CERVICAL FUSION      COLONOSCOPY  2018, 2016    COLONOSCOPY N/A 08/28/2023    Procedure: COLONOSCOPY WITH POLYPECTOMIES/HOT SNARE WITH ELEVIEW INJECTION AND CLIP APPLICATION X 2;  Surgeon: Jeanette Mcmahon MD;  Location: [facility] ENDOSCOPY;  Service: Gastroenterology;  Laterality: N/A;  COLON POLYPS, DIVERTICULOSIS, HEMORRHOIDS    CYSTOSCOPY      CYSTOSCOPY RETROGRADE PYELOGRAM      ENDOSCOPY  2018    ENDOSCOPY N/A 08/28/2023    Procedure: ESOPHAGOGASTRODUODENOSCOPY WITH BIOPSIES AND ESOPHAGEAL DILATION TO 18 MM;  Surgeon: Jeanette Mcmahon MD;  Location: [facility] ENDOSCOPY;  Service: Gastroenterology;  Laterality: N/A;  ESOPHAGITIS    FOOT SURGERY      HAND SURGERY Left     HAND SURGERY Right     HIATAL HERNIA REPAIR  05/10/2019    NECK SURGERY      TUBAL ABDOMINAL LIGATION      UPPER GASTROINTESTINAL ENDOSCOPY      VEIN SURGERY       Family History    Problem Relation Age of Onset    Stroke Mother     Hypertension Mother     Heart disease Brother     Kidney nephrosis Brother     Hypertension Maternal Grandmother     Diabetes Maternal Grandmother     Cancer Maternal Grandfather         Unspecified    Cancer Maternal Aunt     Breast cancer Maternal Aunt     Cancer Other         Unspecified    Cancer Other         Unspecified    Lung cancer Other     Colon cancer Neg Hx      Social History     Socioeconomic History    Marital status:     Number of children: 2   Tobacco Use    Smoking status: Never    Smokeless tobacco: Never   Vaping Use    Vaping status: Never Used   Substance and Sexual Activity    Alcohol use: Not Currently     Comment: former- 7-8-19    Drug use: Never    Sexual activity: Not Currently     No Known Allergies  Current Outpatient Medications   Medication Sig Dispense Refill    estradiol (ESTRACE VAGINAL) 0.1 MG/GM vaginal cream Place 0.5 gm PV and massage 0.5 gm to vulva and vestibule at night 2x/week 42.5 g 3    galcanezumab-gnlm (EMGALITY) 120 MG/ML auto-injector pen Inject 1 mL under the skin into the appropriate area as directed Every 30 (Thirty) Days. 1 mL 5    Ibuprofen 3 %, Gabapentin 10 %, Baclofen 2 %, lidocaine 4 %, Ketamine HCl 4 % Apply 1-2 g topically to the appropriate area as directed 3 (Three) to 4 (Four) times daily. 90 g 0    Lifitegrast (Xiidra) 5 % ophthalmic solution Administer 1 drop to both eyes 2 (Two) Times a Day.      Linzess 72 MCG capsule capsule Take 1 capsule by mouth Every Morning Before Breakfast. 90 capsule 1    omeprazole (priLOSEC) 40 MG capsule Take 1 capsule by mouth Daily. 90 capsule 0    polyethylene glycol (MIRALAX) 17 GM/SCOOP powder       PreviDent 5000 Plus 1.1 % cream As Needed.      propranolol LA (INDERAL LA) 60 MG 24 hr capsule propranolol ER 60 mg capsule,24 hr,extended release      QUEtiapine (SEROquel) 200 MG tablet 1 tab(s) orally 1 times a day at bedtime for 90 days      simethicone  (Mi-Acid Gas Relief) 80 MG chewable tablet 1 tab(s) chewed 4 times a day PRN for 30 day(s)      simvastatin (ZOCOR) 20 MG tablet Take 1 tablet by mouth Every Night.      Synthroid 50 MCG tablet Take 1 tablet by mouth Every Morning.      testosterone (Testim) 50 MG/5GM (1%) gel gel Place entirety of one tube of testim in a 5 ml syringe.  Apply 0.5 ml to back of the knee daily 15 g 5    tiZANidine (ZANAFLEX) 4 MG tablet Take 1 tablet by mouth every night at bedtime. 30 tablet 3    valACYclovir (Valtrex) 500 MG tablet Take 1 tablet by mouth Every Other Day.      Vibegron (Gemtesa) 75 MG tablet Take 1 tablet by mouth Daily for 180 days. 90 tablet 1    vilazodone (VIIBRYD) 40 MG tablet tablet Take 1 tablet by mouth Daily.      vitamin D (ERGOCALCIFEROL) 1.25 MG (59011 UT) capsule capsule Take 1 capsule by mouth Every 7 (Seven) Days.       No current facility-administered medications for this visit.     Review of Systems   Constitutional: Negative.    HENT: Negative.     Eyes: Negative.    Respiratory: Negative.     Cardiovascular: Negative.    Gastrointestinal: Negative.    Endocrine: Negative.    Genitourinary: Negative.    Musculoskeletal: Negative.         Left third toe pain   Skin: Negative.    Allergic/Immunologic: Negative.    Neurological: Negative.    Hematological: Negative.    Psychiatric/Behavioral: Negative.     All other systems reviewed and are negative.      OBJECTIVE     Vitals:    06/24/24 0844   BP: 110/74   Pulse: 62   Temp: 98.4 °F (36.9 °C)   SpO2: 95%       WBC   Date Value Ref Range Status   05/31/2024 5.38 3.40 - 10.80 10*3/mm3 Final     RBC   Date Value Ref Range Status   05/31/2024 4.28 3.77 - 5.28 10*6/mm3 Final     Hemoglobin   Date Value Ref Range Status   05/31/2024 13.2 12.0 - 15.9 g/dL Final     Hematocrit   Date Value Ref Range Status   05/31/2024 40.1 34.0 - 46.6 % Final     MCV   Date Value Ref Range Status   05/31/2024 93.7 79.0 - 97.0 fL Final     MCH   Date Value Ref Range Status    05/31/2024 30.8 26.6 - 33.0 pg Final     MCHC   Date Value Ref Range Status   05/31/2024 32.9 31.5 - 35.7 g/dL Final     RDW   Date Value Ref Range Status   05/31/2024 12.6 12.3 - 15.4 % Final     RDW-SD   Date Value Ref Range Status   05/31/2024 42.9 37.0 - 54.0 fl Final     MPV   Date Value Ref Range Status   05/31/2024 10.6 6.0 - 12.0 fL Final     Platelets   Date Value Ref Range Status   05/31/2024 266 140 - 450 10*3/mm3 Final     Neutrophil %   Date Value Ref Range Status   05/31/2024 57.5 42.7 - 76.0 % Final     Lymphocyte %   Date Value Ref Range Status   05/31/2024 31.6 19.6 - 45.3 % Final     Monocyte %   Date Value Ref Range Status   05/31/2024 6.3 5.0 - 12.0 % Final     Eosinophil %   Date Value Ref Range Status   05/31/2024 3.5 0.3 - 6.2 % Final     Basophil %   Date Value Ref Range Status   05/31/2024 0.9 0.0 - 1.5 % Final     Immature Grans %   Date Value Ref Range Status   05/31/2024 0.2 0.0 - 0.5 % Final     Neutrophils, Absolute   Date Value Ref Range Status   05/31/2024 3.09 1.70 - 7.00 10*3/mm3 Final     Lymphocytes, Absolute   Date Value Ref Range Status   05/31/2024 1.70 0.70 - 3.10 10*3/mm3 Final     Monocytes, Absolute   Date Value Ref Range Status   05/31/2024 0.34 0.10 - 0.90 10*3/mm3 Final     Eosinophils, Absolute   Date Value Ref Range Status   05/31/2024 0.19 0.00 - 0.40 10*3/mm3 Final     Basophils, Absolute   Date Value Ref Range Status   05/31/2024 0.05 0.00 - 0.20 10*3/mm3 Final     Immature Grans, Absolute   Date Value Ref Range Status   05/31/2024 0.01 0.00 - 0.05 10*3/mm3 Final     nRBC   Date Value Ref Range Status   05/31/2024 0.0 0.0 - 0.2 /100 WBC Final         Lab Results   Component Value Date    GLUCOSE 97 05/31/2024    BUN 12 05/31/2024    CREATININE 1.01 (H) 05/31/2024    BCR 11.9 05/31/2024    K 4.3 05/31/2024    CO2 24.8 05/31/2024    CALCIUM 10.0 05/31/2024    ALBUMIN 4.7 05/31/2024    LABIL2 1.6 05/18/2020    AST 20 05/31/2024    ALT 16 05/31/2024       Patient seen  in no apparent distress.      PHYSICAL EXAM:     Foot/Ankle Exam    GENERAL  Appearance:  appears stated age  Orientation:  AAOx3  Affect:  appropriate  Gait:  unimpaired  Assistance:  independent  Right shoe gear: casual shoe  Left shoe gear: casual shoe    VASCULAR     Right Foot Vascularity   Normal vascular exam    Dorsalis pedis:  2+  Posterior tibial:  2+  Skin temperature:  warm  Edema grading:  None  CFT:  < 3 seconds  Pedal hair growth:  Present  Varicosities:  none     Left Foot Vascularity   Normal vascular exam    Dorsalis pedis:  2+  Posterior tibial:  2+  Skin temperature:  warm  Edema grading:  None  CFT:  < 3 seconds  Pedal hair growth:  Present  Varicosities:  none     NEUROLOGIC     Right Foot Neurologic   Normal sensation    Light touch sensation: normal  Vibratory sensation: normal  Hot/Cold sensation: normal  Protective Sensation using [place] Monofilament:   Sites intact: 10  Sites tested: 10     Left Foot Neurologic   Normal sensation    Light touch sensation: normal  Vibratory sensation: normal  Hot/Cold sensation:  normal  Protective Sensation using [place] Monofilament:   Sites intact: 10  Sites tested: 10    MUSCLE STRENGTH     Right Foot Muscle Strength   Foot dorsiflexion:  4  Foot plantar flexion:  4  Foot inversion:  4  Foot eversion:  4     Left Foot Muscle Strength   Foot dorsiflexion:  4  Foot plantar flexion:  4  Foot inversion:  4  Foot eversion:  4    RANGE OF MOTION     Right Foot Range of Motion   Foot and ankle ROM within normal limits       Left Foot Range of Motion   Foot and ankle ROM within normal limits      DERMATOLOGIC      Right Foot Dermatologic   Skin  Right foot skin is intact.      Left Foot Dermatologic   Skin  Left foot skin is intact.       RADIOLOGY:          DEXA Bone Density Axial    Result Date: 6/14/2024  Narrative: DEXA BONE DENSITY AXIAL Date of Exam: 6/14/2024 8:20 AM EDT Indication: osteoporosis. Comparison: None available Findings:  Lumbar Spine The BMD measured in the L1-L4 region is 1.138 g/cm2 with a T-score of -0.5. This patient is considered normal according to World Health Organization (WHO) criteria. Femoral Neck The BMD measured at the left femoral neck is 0.784 g/cm2 with a T-score of -1.8. The BMD measured at the right femoral neck is 0.715 g/cm2 with a T-score of -2.3. The BMD of the mean femoral neck is 0.750 g/cm2 with a T score of -2.1. This patient is considered osteopenic according to World Health Organization (WHO) criteria. Total Hip The BMD of the total left hip is 0.836 g/cm2 with a T-score of -1.4. The BMD of the total right hip is 0.795 g/cm2 with a T-score of -1.7. The BMD of the mean total hip is 0.816 g/cm2 with a T-score of -1.5. This patient is considered osteopenic according to World Health Organization (WHO) criteria. FRAX Score: The estimated 10 year risk of a major osteoporotic fracture is calculated to be 17.2% and a hip fracture of 2.8%.     Impression: Impression: Osteopenia -Based upon the FRAX score, patient does not meet criteria for initiation of pharmacological treatment recommendations for prevention of osteoporosis per the National Osteoporosis Foundation (NOF) guidelines. However, individualized treatment decisions should be made accounting for additional clinical factors. Report dictated by: Charlee Edwards  I have personally reviewed this case and agree with the findings above: Electronically Signed: Anurag Alaniz MD  6/14/2024 3:44 PM EDT  Workstation ID: [id number]    XR Wrist 2 View Right    Result Date: 5/28/2024  Narrative: X-Ray Report: Right wrist  X-Ray Indication: Evaluation of right wrist pain AP/Lateral view(s) Findings: heterotopic ossification and post-operative changes to the distal ulnar region, no acute fracture, moderate CMC changes Prior studies available for comparison: no        ASSESSMENT/PLAN     Diagnoses and all orders for this visit:    1. Closed displaced fracture of proximal  phalanx of lesser toe of left foot with routine healing, subsequent encounter (Primary)    2. Closed nondisplaced fracture of distal phalanx of left great toe with routine healing, subsequent encounter      Patient doing well, no complaints.  Return to clinic as needed.    Discussed findings and treatment plan including risks, benefits, and treatment options with patient in detail. Patient agreed with treatment plan.    Medications and allergies reviewed.  Reviewed available lab values along with other pertinent labs.  These were discussed with the patient.    An After Visit Summary was printed and given to the patient at discharge, including (if requested) any available informative/educational handouts regarding diagnosis, treatment, or medications. All questions were answered to patient/family satisfaction. Should symptoms fail to improve or worsen they agree to call or return to clinic or to go to the Emergency Department. Discussed the importance of following up with any needed screening tests/labs/specialist appointments and any requested follow-up recommended by me today. Importance of maintaining follow-up discussed and patient accepts that missed appointments can delay diagnosis and potentially lead to worsening of conditions.    Return if symptoms worsen or fail to improve., or sooner if acute issues arise.    This document has been electronically signed by Dain Walker DPM on June 24, 2024 09:49 EDT

## 2024-06-25 ENCOUNTER — TELEPHONE (OUTPATIENT)
Dept: ORTHOPEDIC SURGERY | Facility: CLINIC | Age: 64
End: 2024-06-25
Payer: MEDICARE

## 2024-06-25 DIAGNOSIS — M25.531 RIGHT WRIST PAIN: Primary | ICD-10-CM

## 2024-06-25 LAB
TESTOST FREE SERPL-MCNC: 0.7 PG/ML (ref 0–4.2)
TESTOST SERPL-MCNC: 41.3 NG/DL (ref 7–40)

## 2024-06-27 ENCOUNTER — OFFICE VISIT (OUTPATIENT)
Dept: INTERNAL MEDICINE | Facility: CLINIC | Age: 64
End: 2024-06-27
Payer: MEDICARE

## 2024-06-27 VITALS
BODY MASS INDEX: 23.08 KG/M2 | RESPIRATION RATE: 18 BRPM | WEIGHT: 143.6 LBS | SYSTOLIC BLOOD PRESSURE: 98 MMHG | HEIGHT: 66 IN | HEART RATE: 59 BPM | DIASTOLIC BLOOD PRESSURE: 58 MMHG | OXYGEN SATURATION: 97 % | TEMPERATURE: 97.7 F

## 2024-06-27 DIAGNOSIS — K21.9 GASTROESOPHAGEAL REFLUX DISEASE, UNSPECIFIED WHETHER ESOPHAGITIS PRESENT: ICD-10-CM

## 2024-06-27 DIAGNOSIS — F33.0 MAJOR DEPRESSIVE DISORDER, RECURRENT, MILD: ICD-10-CM

## 2024-06-27 DIAGNOSIS — R42 DIZZINESS: ICD-10-CM

## 2024-06-27 DIAGNOSIS — H11.31 CONJUNCTIVAL HEMORRHAGE OF RIGHT EYE: ICD-10-CM

## 2024-06-27 DIAGNOSIS — F41.1 GAD (GENERALIZED ANXIETY DISORDER): ICD-10-CM

## 2024-06-27 DIAGNOSIS — G43.909 MIGRAINE WITHOUT STATUS MIGRAINOSUS, NOT INTRACTABLE, UNSPECIFIED MIGRAINE TYPE: ICD-10-CM

## 2024-06-27 DIAGNOSIS — Z00.00 ENCOUNTER FOR ANNUAL WELLNESS EXAM IN MEDICARE PATIENT: Primary | ICD-10-CM

## 2024-06-27 PROCEDURE — 99214 OFFICE O/P EST MOD 30 MIN: CPT | Performed by: NURSE PRACTITIONER

## 2024-06-27 PROCEDURE — G0439 PPPS, SUBSEQ VISIT: HCPCS | Performed by: NURSE PRACTITIONER

## 2024-06-27 PROCEDURE — 1170F FXNL STATUS ASSESSED: CPT | Performed by: NURSE PRACTITIONER

## 2024-06-27 PROCEDURE — 1126F AMNT PAIN NOTED NONE PRSNT: CPT | Performed by: NURSE PRACTITIONER

## 2024-06-27 PROCEDURE — 3044F HG A1C LEVEL LT 7.0%: CPT | Performed by: NURSE PRACTITIONER

## 2024-06-27 NOTE — PROGRESS NOTES
The ABCs of the Annual Wellness Visit  Subsequent Medicare Wellness Visit    Subjective    Ann Perla is a 63 y.o. female who presents for a Subsequent Medicare Wellness Visit.    The following portions of the patient's history were reviewed and   updated as appropriate: allergies, current medications, past family history, past medical history, past social history, past surgical history, and problem list.    Compared to one year ago, the patient feels her physical   health is the same.    Compared to one year ago, the patient feels her mental   health is the same.    Recent Hospitalizations:  She was admitted within the past 365 days at [facility].       Current Medical Providers:  Patient Care Team:  Usha Bonilla APRN as PCP - General (Nurse Practitioner)  Jacqueline Siddiqui APRN as Nurse Practitioner (Nurse Practitioner)  Jeanette Mcmahon MD as Consulting Physician (Gastroenterology)    Outpatient Medications Prior to Visit   Medication Sig Dispense Refill    estradiol (ESTRACE VAGINAL) 0.1 MG/GM vaginal cream Place 0.5 gm PV and massage 0.5 gm to vulva and vestibule at night 2x/week 42.5 g 3    galcanezumab-gnlm (EMGALITY) 120 MG/ML auto-injector pen Inject 1 mL under the skin into the appropriate area as directed Every 30 (Thirty) Days. 1 mL 5    Ibuprofen 3 %, Gabapentin 10 %, Baclofen 2 %, lidocaine 4 %, Ketamine HCl 4 % Apply 1-2 g topically to the appropriate area as directed 3 (Three) to 4 (Four) times daily. 90 g 0    Lifitegrast (Xiidra) 5 % ophthalmic solution Administer 1 drop to both eyes 2 (Two) Times a Day.      Linzess 72 MCG capsule capsule Take 1 capsule by mouth Every Morning Before Breakfast. 90 capsule 1    omeprazole (priLOSEC) 40 MG capsule Take 1 capsule by mouth Daily. 90 capsule 0    polyethylene glycol (MIRALAX) 17 GM/SCOOP powder       PreviDent 5000 Plus 1.1 % cream As Needed.      QUEtiapine (SEROquel) 200 MG tablet 1 tab(s) orally 1 times a day at bedtime for  90 days      simethicone (Mi-Acid Gas Relief) 80 MG chewable tablet 1 tab(s) chewed 4 times a day PRN for 30 day(s)      simvastatin (ZOCOR) 20 MG tablet Take 1 tablet by mouth Every Night.      Synthroid 50 MCG tablet Take 1 tablet by mouth Every Morning.      testosterone (Testim) 50 MG/5GM (1%) gel gel Place entirety of one tube of testim in a 5 ml syringe.  Apply 0.5 ml to back of the knee daily 15 g 5    valACYclovir (Valtrex) 500 MG tablet Take 1 tablet by mouth Every Other Day.      Vibegron (Gemtesa) 75 MG tablet Take 1 tablet by mouth Daily for 180 days. 90 tablet 1    vilazodone (VIIBRYD) 40 MG tablet tablet Take 1 tablet by mouth Daily.      vitamin D (ERGOCALCIFEROL) 1.25 MG (85391 UT) capsule capsule Take 1 capsule by mouth Every 7 (Seven) Days.      propranolol LA (INDERAL LA) 60 MG 24 hr capsule propranolol ER 60 mg capsule,24 hr,extended release      tiZANidine (ZANAFLEX) 4 MG tablet Take 1 tablet by mouth every night at bedtime. 30 tablet 3     No facility-administered medications prior to visit.       No opioid medication identified on active medication list. I have reviewed chart for other potential  high risk medication/s and harmful drug interactions in the elderly.        Aspirin is not on active medication list.  Aspirin use is not indicated based on review of current medical condition/s. Risk of harm outweighs potential benefits.  .    Patient Active Problem List   Diagnosis    Cervical disc herniation    Chronic kidney disease, stage 2 (mild)    Degenerative arthritis of thumb    Depression    Hx of blood clots    Lower urinary tract infectious disease    Multiple allergies    Trigger thumb of left hand    Trigger thumb of right hand    Osteoporosis    Chronic idiopathic constipation    Heartburn    Esophageal dysphagia    Cervicalgia    Bilateral occipital neuralgia    Menopause    Osteopenia of multiple sites    Genitourinary syndrome of menopause    Decreased libido    Hypoactive sexual  "desire disorder     Advance Care Planning   Advance Care Planning     Advance Directive is not on file.  ACP discussion was held with the patient during this visit. Patient has an advance directive (not in EMR), copy requested.     Objective    Vitals:    24 0935   BP: 98/58   BP Location: Left arm   Patient Position: Sitting   Cuff Size: Adult   Pulse: 59   Resp: 18   Temp: 97.7 °F (36.5 °C)   SpO2: 97%   Weight: 65.1 kg (143 lb 9.6 oz)   Height: 167.6 cm (66\")     Estimated body mass index is 23.18 kg/m² as calculated from the following:    Height as of this encounter: 167.6 cm (66\").    Weight as of this encounter: 65.1 kg (143 lb 9.6 oz).    BMI is within normal parameters. No other follow-up for BMI required.      Does the patient have evidence of cognitive impairment? No    Lab Results   Component Value Date    TRIG 133 2024    HDL 45 2024    LDL 65 2024    VLDL 23 2024    HGBA1C 5.60 2024        HEALTH RISK ASSESSMENT    Smoking Status:  Social History     Tobacco Use   Smoking Status Never   Smokeless Tobacco Never     Alcohol Consumption:  Social History     Substance and Sexual Activity   Alcohol Use Not Currently    Comment: former- 7-8-19     Fall Risk Screen:    CAITLIN Fall Risk Assessment was completed, and patient is at LOW risk for falls.Assessment completed on:2024    Depression Screenin/27/2024     9:00 AM   PHQ-2/PHQ-9 Depression Screening   Little Interest or Pleasure in Doing Things 1-->several days   Feeling Down, Depressed or Hopeless 1-->several days   Trouble Falling or Staying Asleep, or Sleeping Too Much 0-->not at all   Feeling Tired or Having Little Energy 2-->more than half the days   Poor Appetite or Overeating 0-->not at all   Feeling Bad about Yourself - or that You are a Failure or Have Let Yourself or Your Family Down 1-->several days   Trouble Concentrating on Things, Such as Reading the Newspaper or Watching Television 2-->more " than half the days   Moving or Speaking So Slowly that Other People Could Have Noticed? Or the Opposite - Being So Fidgety 0-->not at all   Thoughts that You Would be Better Off Dead or of Hurting Yourself in Some Way 1-->several days   PHQ-9: Brief Depression Severity Measure Score 8   If You Checked Off Any Problems, How Difficult Have These Problems Made It For You to Do Your Work, Take Care of Things at Home, or Get Along with Other People? somewhat difficult       Health Habits and Functional and Cognitive Screenin/27/2024     9:00 AM   Functional & Cognitive Status   Do you have difficulty preparing food and eating? No   Do you have difficulty bathing yourself, getting dressed or grooming yourself? No   Do you have difficulty using the toilet? No   Do you have difficulty moving around from place to place? No   Do you have trouble with steps or getting out of a bed or a chair? No   Current Diet Well Balanced Diet   Dental Exam Up to date   Eye Exam Not up to date   Exercise (times per week) 2 times per week   Current Exercises Include Gardening;Yard Work   Do you need help using the phone?  No   Are you deaf or do you have serious difficulty hearing?  Yes   Do you need help to go to places out of walking distance? No   Do you need help shopping? No   Do you need help preparing meals?  No   Do you need help with housework?  No   Do you need help with laundry? No   Do you need help taking your medications? No   Do you need help managing money? No   Do you ever drive or ride in a car without wearing a seat belt? No   Have you felt unusual stress, anger or loneliness in the last month? Yes   Who do you live with? Spouse   If you need help, do you have trouble finding someone available to you? No   Have you been bothered in the last four weeks by sexual problems? No   Do you have difficulty concentrating, remembering or making decisions? Yes       Age-appropriate Screening Schedule:  Refer to the list below  for future screening recommendations based on patient's age, sex and/or medical conditions. Orders for these recommended tests are listed in the plan section. The patient has been provided with a written plan.    Health Maintenance   Topic Date Due    TDAP/TD VACCINES (1 - Tdap) Never done    ZOSTER VACCINE (2 of 3) 02/22/2017    RSV Vaccine - Adults (1 - 1-dose 60+ series) Never done    COVID-19 Vaccine (4 - 2023-24 season) 09/01/2023    INFLUENZA VACCINE  08/01/2024    LIPID PANEL  05/31/2025    ANNUAL WELLNESS VISIT  06/27/2025    MAMMOGRAM  05/17/2026    DXA SCAN  06/14/2026    COLORECTAL CANCER SCREENING  08/28/2026    PAP SMEAR  01/11/2027    HEPATITIS C SCREENING  Completed    Pneumococcal Vaccine 0-64  Aged Out                  CMS Preventative Services Quick Reference  Risk Factors Identified During Encounter  Polypharmacy: Medication List reviewed  The above risks/problems have been discussed with the patient.  Pertinent information has been shared with the patient in the After Visit Summary.  An After Visit Summary and PPPS were made available to the patient.    Follow Up:   Next Medicare Wellness visit to be scheduled in 1 year.       Additional E&M Note during same encounter follows:  Patient has multiple medical problems which are significant and separately identifiable that require additional work above and beyond the Medicare Wellness Visit.      Chief Complaint  Medicare Wellness-subsequent (Patient requesting update on Prolia injection- needed PA?), Heartburn (Follow up omeprazole), Headache (Lighted and dizziness after stopping the Topamax. Started the EMGALITY this past Sunday. ), Eye Problem (Right eye- grandson noticed red spot in right eye yesterday morning. No pain. ), and Dizziness (2-3 weeks- feels funny right now. Sitting and standing. )    Subjective        HPI  Ann Perla is also being seen today for   Headache-started emgality last week, weaned from topamax  Stopped 3 days  "ago    GERD-improved with omeprazole    Right eye concern-patient reports that her grandson noticed a red spot in her right eye.  She denies any pain with this.    Dizziness at times int he last 2-3 wks  She reports rooming spinning intermittently. Hx of vertigo. Vertigo sx have improved. Feeling off balance. Still on propanolol for migraine prevention. Low bp in the morning. Dizziness worse with position changes.    Depression/Anxiety-long hx of this. She has been on seroquel and viibryd  Denies SI/HI currently  She previously saw escobar             Objective   Vital Signs:  BP 98/58 (BP Location: Left arm, Patient Position: Sitting, Cuff Size: Adult)   Pulse 59   Temp 97.7 °F (36.5 °C)   Resp 18   Ht 167.6 cm (66\")   Wt 65.1 kg (143 lb 9.6 oz)   SpO2 97%   BMI 23.18 kg/m²     Physical Exam  Vitals and nursing note reviewed.   Constitutional:       General: She is not in acute distress.     Appearance: Normal appearance.   HENT:      Head: Normocephalic and atraumatic.      Right Ear: External ear normal.      Left Ear: External ear normal.      Nose: Nose normal.      Mouth/Throat:      Mouth: Mucous membranes are moist.   Eyes:      Conjunctiva/sclera: Conjunctivae normal.   Cardiovascular:      Rate and Rhythm: Normal rate and regular rhythm.      Pulses: Normal pulses.      Heart sounds: Normal heart sounds. No murmur heard.     No friction rub. No gallop.   Pulmonary:      Effort: Pulmonary effort is normal. No respiratory distress.      Breath sounds: No wheezing, rhonchi or rales.   Musculoskeletal:      Cervical back: Neck supple.      Right lower leg: No edema.      Left lower leg: No edema.   Skin:     General: Skin is warm and dry.   Neurological:      General: No focal deficit present.      Mental Status: She is alert and oriented to person, place, and time.   Psychiatric:         Mood and Affect: Mood normal.         Behavior: Behavior normal.                         Assessment and Plan "   Diagnoses and all orders for this visit:    1. Encounter for annual wellness exam in Medicare patient (Primary)    2. Major depressive disorder, recurrent, mild  -     Ambulatory Referral to Psychiatry    3. NELLA (generalized anxiety disorder)  Comments:  She will continue with current medications.  New referral placed to Veronica Larson for further evaluation  Orders:  -     Ambulatory Referral to Psychiatry    4. Conjunctival hemorrhage of right eye  Comments:  Discussed course and expectation for improvement.  She will call with any eye pain or issues with vision.    5. Gastroesophageal reflux disease, unspecified whether esophagitis present  Comments:  Improved, continue omeprazole at this time.    6. Migraine without status migrainosus, not intractable, unspecified migraine type  Comments:  She will continue Emgality.  Stopping propranolol given low blood pressure    7. Dizziness  Comments:  Discussed some symptoms of BPPV that is improved.  Concerned that feeling of off balance is related to low blood pressures.  Stopping propranolol at this time             Follow Up   Return in about 6 weeks (around 8/8/2024).  Patient was given instructions and counseling regarding her condition or for health maintenance advice. Please see specific information pulled into the AVS if appropriate.

## 2024-07-01 ENCOUNTER — HOSPITAL ENCOUNTER (OUTPATIENT)
Facility: HOSPITAL | Age: 64
Discharge: HOME | End: 2024-07-01
Payer: MEDICARE

## 2024-07-01 ENCOUNTER — HOSPITAL ENCOUNTER (OUTPATIENT)
Dept: MRI IMAGING | Facility: HOSPITAL | Age: 64
Discharge: HOME | End: 2024-07-01
Payer: MEDICARE

## 2024-07-01 DIAGNOSIS — M25.531 RIGHT WRIST PAIN: ICD-10-CM

## 2024-07-01 DIAGNOSIS — R20.2 PARESTHESIA: ICD-10-CM

## 2024-07-01 PROCEDURE — 73218 MRI UPPER EXTREMITY W/O DYE: CPT

## 2024-07-01 PROCEDURE — 95909 NRV CNDJ TST 5-6 STUDIES: CPT

## 2024-07-01 PROCEDURE — 95886 MUSC TEST DONE W/N TEST COMP: CPT

## 2024-07-10 ENCOUNTER — HOSPITAL ENCOUNTER (OUTPATIENT)
Dept: INFUSION THERAPY | Facility: HOSPITAL | Age: 64
Discharge: HOME | End: 2024-07-10
Admitting: NURSE PRACTITIONER
Payer: MEDICARE

## 2024-07-10 VITALS
DIASTOLIC BLOOD PRESSURE: 62 MMHG | WEIGHT: 144.4 LBS | SYSTOLIC BLOOD PRESSURE: 119 MMHG | HEIGHT: 66 IN | OXYGEN SATURATION: 99 % | BODY MASS INDEX: 23.21 KG/M2 | RESPIRATION RATE: 20 BRPM | HEART RATE: 71 BPM | TEMPERATURE: 98.2 F

## 2024-07-10 DIAGNOSIS — M81.0 OSTEOPOROSIS, UNSPECIFIED OSTEOPOROSIS TYPE, UNSPECIFIED PATHOLOGICAL FRACTURE PRESENCE: Primary | ICD-10-CM

## 2024-07-10 LAB
25(OH)D3 SERPL-MCNC: 57.7 NG/ML (ref 30–100)
ALBUMIN SERPL-MCNC: 4 G/DL (ref 3.5–5.2)
ALBUMIN/GLOB SERPL: 1.6 G/DL
ALP SERPL-CCNC: 100 U/L (ref 39–117)
ALT SERPL W P-5'-P-CCNC: 15 U/L (ref 1–33)
ANION GAP SERPL CALCULATED.3IONS-SCNC: 10.3 MMOL/L (ref 5–15)
AST SERPL-CCNC: 19 U/L (ref 1–32)
BILIRUB SERPL-MCNC: 0.3 MG/DL (ref 0–1.2)
BUN SERPL-MCNC: 13 MG/DL (ref 8–23)
BUN/CREAT SERPL: 14.9 (ref 7–25)
CALCIUM SPEC-SCNC: 9.4 MG/DL (ref 8.6–10.5)
CHLORIDE SERPL-SCNC: 106 MMOL/L (ref 98–107)
CO2 SERPL-SCNC: 22.7 MMOL/L (ref 22–29)
CREAT SERPL-MCNC: 0.87 MG/DL (ref 0.57–1)
EGFRCR SERPLBLD CKD-EPI 2021: 75 ML/MIN/1.73
GLOBULIN UR ELPH-MCNC: 2.5 GM/DL
GLUCOSE SERPL-MCNC: 132 MG/DL (ref 65–99)
MAGNESIUM SERPL-MCNC: 2 MG/DL (ref 1.6–2.4)
PHOSPHATE SERPL-MCNC: 3.9 MG/DL (ref 2.5–4.5)
POTASSIUM SERPL-SCNC: 4.2 MMOL/L (ref 3.5–5.2)
PROT SERPL-MCNC: 6.5 G/DL (ref 6–8.5)
SODIUM SERPL-SCNC: 139 MMOL/L (ref 136–145)

## 2024-07-10 PROCEDURE — 82306 VITAMIN D 25 HYDROXY: CPT | Performed by: NURSE PRACTITIONER

## 2024-07-10 PROCEDURE — 83735 ASSAY OF MAGNESIUM: CPT | Performed by: NURSE PRACTITIONER

## 2024-07-10 PROCEDURE — 96372 THER/PROPH/DIAG INJ SC/IM: CPT

## 2024-07-10 PROCEDURE — 36415 COLL VENOUS BLD VENIPUNCTURE: CPT

## 2024-07-10 PROCEDURE — 25010000002 DENOSUMAB 60 MG/ML SOLUTION PREFILLED SYRINGE: Performed by: NURSE PRACTITIONER

## 2024-07-10 PROCEDURE — 84100 ASSAY OF PHOSPHORUS: CPT | Performed by: NURSE PRACTITIONER

## 2024-07-10 PROCEDURE — 80053 COMPREHEN METABOLIC PANEL: CPT | Performed by: NURSE PRACTITIONER

## 2024-07-10 RX ADMIN — DENOSUMAB 60 MG: 60 INJECTION SUBCUTANEOUS at 07:38

## 2024-07-22 ENCOUNTER — OFFICE VISIT (OUTPATIENT)
Dept: ORTHOPEDIC SURGERY | Facility: CLINIC | Age: 64
End: 2024-07-22
Payer: MEDICARE

## 2024-07-22 VITALS
OXYGEN SATURATION: 98 % | DIASTOLIC BLOOD PRESSURE: 79 MMHG | HEART RATE: 73 BPM | HEIGHT: 66 IN | WEIGHT: 144 LBS | SYSTOLIC BLOOD PRESSURE: 121 MMHG | BODY MASS INDEX: 23.14 KG/M2

## 2024-07-22 DIAGNOSIS — M19.039 WRIST ARTHRITIS: ICD-10-CM

## 2024-07-22 DIAGNOSIS — S63.591A COMPLEX TEAR OF TRIANGULAR FIBROCARTILAGE OF RIGHT WRIST, INITIAL ENCOUNTER: ICD-10-CM

## 2024-07-22 DIAGNOSIS — M89.8X9 HETEROTOPIC OSSIFICATION: ICD-10-CM

## 2024-07-22 DIAGNOSIS — G56.21 CUBITAL TUNNEL SYNDROME ON RIGHT: Primary | ICD-10-CM

## 2024-07-22 DIAGNOSIS — M19.049 HAND ARTHRITIS: ICD-10-CM

## 2024-07-22 PROCEDURE — 1159F MED LIST DOCD IN RCRD: CPT | Performed by: PHYSICIAN ASSISTANT

## 2024-07-22 PROCEDURE — 99213 OFFICE O/P EST LOW 20 MIN: CPT | Performed by: PHYSICIAN ASSISTANT

## 2024-07-22 PROCEDURE — 1160F RVW MEDS BY RX/DR IN RCRD: CPT | Performed by: PHYSICIAN ASSISTANT

## 2024-07-22 NOTE — PROGRESS NOTES
"Chief Complaint  Follow-up of the Right Wrist    Subjective          History of Present Illness      Ann Perla is a 63 y.o. female  presents to [facility] for     Patient presents to review MRI of her right hand and wrist and also her nerve conduction test.  Dr. Hua saw her on 5/28/2024 and ordered these tests for her.  She has a history of carpal tunnel surgery in the past she also had a surgery to her ulnar side of her wrist in the past from Dr. Cuevas.  She has numbness and tingling and pain to the radial ulnar and dorsal wrist and into her metacarpals.      No Known Allergies     Social History     Socioeconomic History    Marital status:     Number of children: 2   Tobacco Use    Smoking status: Never    Smokeless tobacco: Never   Vaping Use    Vaping status: Never Used   Substance and Sexual Activity    Alcohol use: Not Currently     Comment: former- 7-8-19    Drug use: Never    Sexual activity: Not Currently        REVIEW OF SYSTEMS    Constitutional: Awake alert and oriented x3, no acute distress, denies fevers, chills, weight loss  Respiratory: No respiratory distress  Vascular: Brisk cap refill, Intact distal pulses, No cyanosis, compartments soft with no signs or symptoms of compartment syndrome or DVT.   Cardiovascular: Denies chest pain, shortness of breath  Skin: Denies rashes, acute skin changes  Neurologic: Denies headache, loss of consciousness  MSK: Right hand and wrist pain      Objective   Vital Signs:   /79   Pulse 73   Ht 167.6 cm (66\")   Wt 65.3 kg (144 lb)   SpO2 98%   BMI 23.24 kg/m²     Body mass index is 23.24 kg/m².    Physical Exam       Right upper extremity: well healed scars to the ulnar wrist, carpal tunnel and cubital elbow, mild swelling, tender to the ulnar wrist, extension to 80 degrees, flexion 75, supination and pronation good, mild atrophy, sensation intact to the medial, radial and ulnar nerve, neurovascularly " intact          Procedures    Imaging Results (Most Recent)       None           MRI HAND RIGHT WO CONTRAST     Date of Exam: 7/1/2024 9:30 AM EDT     Indication: Right hand pain.     Comparison: 3 view right wrist dated 5/28/2024     Technique:  Routine multiplanar/multisequence sequence images of the right hand were obtained without contrast administration.          Findings:  Markers have been placed along the ulnar aspect of the carpus indicating the site of clinical concern. Extending from the ulnar styloid process to the triquetrum is heterotopic ossification measuring 0.9 cm x 1.6 cm. There is mechanical erosion of both   the hamate and triquetrum. No fracture is seen. Alignment is anatomic.     There is a perforation of the scapholunate ligament. There is a tear of the TFC ligament. There is chondrocalcinosis of the TFC ligament consistent with CPPD. The lunotriquetral ligament is intact.     Dorsal compartment tendons all appear unremarkable. Flexor tendons appear normal. Contents of the carpal tunnel appear normal including the median nerve.     There is a small to moderate effusion in the distal radial ulnar joint. Along the radial aspect of the distal scaphoid and trapezium is a 1.6 cm x 1.0 cm T1 intermediate focus which demonstrates susceptibility artifact. No other significant joint   effusion is seen. Erosive changes are noted involving the second and third metacarpal heads. Small effusions are noted in the first carpal metacarpal and first interphalangeal joints moderate osteoarthritic changes of the first CMC joint are noted with   joint space narrowing and osteophyte formation.     IMPRESSION:  Impression:  1.1.6 cm x 0.9 cm focus of heterotopic ossification distal to the distal ulna extending to and mechanically eroding the hamate and triquetrum. Finding is suspected to be secondary to gout.  2.Subtle erosive changes involving the second and third metacarpal heads.  3.Focus of intermediate signal  abnormality in the soft tissues radial to the distal scaphoid. 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 PVNS could also produce this appearance but is felt less likely given other   findings.  4.Tears of the TFC and scapholunate ligaments. 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  Result Review :   The following data was reviewed by: TORITO Moise on 2024:  Data reviewed : Radiologic studies reviewed by me with the patient      Test Date: 2024 Patient: KIRTI LAGUERRE : 1960 Examiner: Arnel Murray PT, DPT, ECS Sex: Female Height: 167.6 cm Ref Phys: Camron Hua MD ID#: [id number] Weight: 65.14 kg Technician: PATIENT HISTORY/EXAM: Previous right carpal, cubital, and ulnar nerve release at [place] 12 years ago. Reports pain and numbness into the thumb and 5th digit. She reports pain in her wrist as well. She also has neck pain. She has visible atrophy of the right abductor pollicis brevis. MMT: 4/5 R APB, otherwise 5/5. NERVE CONDUCTION SUMMARY: 1. Reduced right ulnar sensory amplitude to digit 5. 2. Focal slowing of the right ulnar motor NCV at the elbow. 3. Mildly reduced right median motor amplitude to the abductor pollicis brevis with normal distal latency and normal right median sensory amplitudes and latencies to digits 1 and 3. 4. There is some evidence suggestive of an anomalous ulnar to median innervation in the right upper extremity. 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 This is an incidental finding. EMG SUMMARY: Needle evaluation of the right abductor pollicis brevis muscle demonstrated large motor unit amplitude and diminished recruitment. All remaining muscles (as indicated in the following table) showed no evidence of electrical instability. IMPRESSION: 1. 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  2. There is evidence of chronic denervation/reinnervation of the right abductor pollicis brevis. This finding is likely due to residual changes from the previous median neuropathy at the wrist. 3. No electrophysiologic evidence of a right cervical radiculopathy, right brachial plexopathy, additional focal neuropathy, polyneuropathic changes, myopathy, or motor neuron patholog        Assessment and Plan    Diagnoses and all orders for this visit:    1. Cubital tunnel syndrome on right (Primary)  -     Ambulatory Referral to Hand Surgery    2. Complex tear of triangular fibrocartilage of right wrist, initial encounter  -     Ambulatory Referral to Hand Surgery    3. Heterotopic ossification  -     Ambulatory Referral to Hand Surgery    4. Wrist arthritis  -     Ambulatory Referral to Hand Surgery    5. Hand arthritis  -     Ambulatory Referral to Hand Surgery        Reviewed MRI and nerve conduction test with the patient, she was advised that we recommend referral to Dr. Cuveas for further evaluation, patient agreed with this.  Follow-up as needed.    Call or return if worsening symptoms.    Follow Up   Return if symptoms worsen or fail to improve.  Patient was given instructions and counseling regarding her condition or for health maintenance advice. Please see specific information pulled into the AVS if appropriate.       EMR Dragon/Transcription disclaimer:  Part of this note may be an electronic transcription/translation of spoken language to printed text using the Dragon Dictation System

## 2024-07-24 ENCOUNTER — OFFICE VISIT (OUTPATIENT)
Dept: GASTROENTEROLOGY | Facility: CLINIC | Age: 64
End: 2024-07-24
Payer: MEDICARE

## 2024-07-24 VITALS
BODY MASS INDEX: 23.25 KG/M2 | WEIGHT: 144.7 LBS | SYSTOLIC BLOOD PRESSURE: 125 MMHG | HEIGHT: 66 IN | HEART RATE: 76 BPM | DIASTOLIC BLOOD PRESSURE: 80 MMHG

## 2024-07-24 DIAGNOSIS — K44.9 PARAESOPHAGEAL HERNIA: ICD-10-CM

## 2024-07-24 DIAGNOSIS — K20.90 ESOPHAGITIS: Primary | ICD-10-CM

## 2024-07-24 DIAGNOSIS — K59.04 CHRONIC IDIOPATHIC CONSTIPATION: ICD-10-CM

## 2024-07-24 RX ORDER — OMEPRAZOLE 40 MG/1
40 CAPSULE, DELAYED RELEASE ORAL DAILY
Qty: 90 CAPSULE | Refills: 1 | Status: SHIPPED | OUTPATIENT
Start: 2024-07-24

## 2024-07-24 NOTE — PROGRESS NOTES
Chief Complaint     Heartburn and Constipation    History of Present Illness     Ann Perla is a 63 y.o. female who presents to [facility] for follow-up of dysphagia, paraesophageal hernia, esophagitis and constipation.    She feels that acid reflux is improved.  She is taking omeprazole 40 mg daily and feels that its effective.      Reports that constipation is improved.  She has not needed anything for constipation in the past month.  She's been eating more vegetables.         History      Past Medical History:   Diagnosis Date    Abdominal pain, LLQ 12/04/2015    Back pain     Bunion     Deep vein thrombosis     Depressive disorder     Disease of thyroid gland     Foot pain, right 04/08/2021    GERD (gastroesophageal reflux disease)     Hallux valgus of right foot 12/03/2018    Hammer toe     Herpes genitalia     History of transfusion     HLD (hyperlipidemia)     Migraine     Mixed incontinence urge and stress     OAB (overactive bladder) 01/24/2018    Seizures     Urinary urgency      Past Surgical History:   Procedure Laterality Date    CERVICAL FUSION      COLONOSCOPY  2018, 2016    COLONOSCOPY N/A 08/28/2023    Procedure: COLONOSCOPY WITH POLYPECTOMIES/HOT SNARE WITH ELEVIEW INJECTION AND CLIP APPLICATION X 2;  Surgeon: Jeanette Mcmahon MD;  Location: [facility] ENDOSCOPY;  Service: Gastroenterology;  Laterality: N/A;  COLON POLYPS, DIVERTICULOSIS, HEMORRHOIDS    CYSTOSCOPY      CYSTOSCOPY RETROGRADE PYELOGRAM      ENDOSCOPY  2018    ENDOSCOPY N/A 08/28/2023    Procedure: ESOPHAGOGASTRODUODENOSCOPY WITH BIOPSIES AND ESOPHAGEAL DILATION TO 18 MM;  Surgeon: Jeanette Mcmahon MD;  Location: [facility] ENDOSCOPY;  Service: Gastroenterology;  Laterality: N/A;  ESOPHAGITIS    FOOT SURGERY      HAND SURGERY Left     HAND SURGERY Right     HIATAL HERNIA REPAIR  05/10/2019    NECK SURGERY      TUBAL ABDOMINAL LIGATION      UPPER GASTROINTESTINAL ENDOSCOPY      VEIN  SURGERY       Family History   Problem Relation Age of Onset    Stroke Mother     Hypertension Mother     Heart disease Brother     Kidney nephrosis Brother     Hypertension Maternal Grandmother     Diabetes Maternal Grandmother     Cancer Maternal Grandfather         Unspecified    Cancer Maternal Aunt     Breast cancer Maternal Aunt     Cancer Other         Unspecified    Cancer Other         Unspecified    Lung cancer Other     Colon cancer Neg Hx         Current Medications       Current Outpatient Medications:     estradiol (ESTRACE VAGINAL) 0.1 MG/GM vaginal cream, Place 0.5 gm PV and massage 0.5 gm to vulva and vestibule at night 2x/week, Disp: 42.5 g, Rfl: 3    galcanezumab-gnlm (EMGALITY) 120 MG/ML auto-injector pen, Inject 1 mL under the skin into the appropriate area as directed Every 30 (Thirty) Days., Disp: 1 mL, Rfl: 5    Ibuprofen 3 %, Gabapentin 10 %, Baclofen 2 %, lidocaine 4 %, Ketamine HCl 4 %, Apply 1-2 g topically to the appropriate area as directed 3 (Three) to 4 (Four) times daily., Disp: 90 g, Rfl: 0    Lifitegrast (Xiidra) 5 % ophthalmic solution, Administer 1 drop to both eyes 2 (Two) Times a Day., Disp: , Rfl:     Linzess 72 MCG capsule capsule, Take 1 capsule by mouth Every Morning Before Breakfast., Disp: 90 capsule, Rfl: 1    omeprazole (priLOSEC) 40 MG capsule, Take 1 capsule by mouth Daily., Disp: 90 capsule, Rfl: 1    polyethylene glycol (MIRALAX) 17 GM/SCOOP powder, , Disp: , Rfl:     PreviDent 5000 Plus 1.1 % cream, As Needed., Disp: , Rfl:     QUEtiapine (SEROquel) 200 MG tablet, 1 tab(s) orally 1 times a day at bedtime for 90 days, Disp: , Rfl:     simethicone (Mi-Acid Gas Relief) 80 MG chewable tablet, 1 tab(s) chewed 4 times a day PRN for 30 day(s), Disp: , Rfl:     simvastatin (ZOCOR) 20 MG tablet, Take 1 tablet by mouth Every Night., Disp: , Rfl:     Synthroid 50 MCG tablet, Take 1 tablet by mouth Every Morning., Disp: , Rfl:     testosterone (Testim) 50 MG/5GM (1%) gel gel,  "Place entirety of one tube of testim in a 5 ml syringe.  Apply 0.5 ml to back of the knee daily, Disp: 15 g, Rfl: 5    valACYclovir (Valtrex) 500 MG tablet, Take 1 tablet by mouth Every Other Day., Disp: , Rfl:     Vibegron (Gemtesa) 75 MG tablet, Take 1 tablet by mouth Daily for 180 days., Disp: 90 tablet, Rfl: 1    vilazodone (VIIBRYD) 40 MG tablet tablet, Take 1 tablet by mouth Daily., Disp: , Rfl:     vitamin D (ERGOCALCIFEROL) 1.25 MG (69340 UT) capsule capsule, Take 1 capsule by mouth Every 7 (Seven) Days., Disp: , Rfl:      Allergies     No Known Allergies    Social History       Social History     Social History Narrative    Not on file         Objective       /80 (BP Location: Left arm, Patient Position: Sitting, Cuff Size: Adult)   Pulse 76   Ht 167.6 cm (65.98\")   Wt 65.6 kg (144 lb 11.2 oz)   BMI 23.37 kg/m²       Physical Exam    Results       Result Review :    The following data was reviewed by: LAURENCE Li on 07/24/2024:    CBC w/diff          5/31/2024    15:37   CBC w/Diff   WBC 5.38    RBC 4.28    Hemoglobin 13.2    Hematocrit 40.1    MCV 93.7    MCH 30.8    MCHC 32.9    RDW 12.6    Platelets 266    Neutrophil Rel % 57.5    Immature Granulocyte Rel % 0.2    Lymphocyte Rel % 31.6    Monocyte Rel % 6.3    Eosinophil Rel % 3.5    Basophil Rel % 0.9      CMP          9/8/2023    14:37 5/31/2024    15:37 7/10/2024    07:05   CMP   Glucose 109  97  132    BUN 12  12  13    Creatinine 0.91  1.01  0.87    EGFR 71.5  62.7  75.0    Sodium 142  142  139    Potassium 4.2  4.3  4.2    Chloride 107  105  106    Calcium 9.9  10.0  9.4    Total Protein 7.2  7.5  6.5    Albumin 4.4  4.7  4.0    Globulin 2.8  2.8  2.5    Total Bilirubin 0.3  0.4  0.3    Alkaline Phosphatase 107  109  100    AST (SGOT) 15  20  19    ALT (SGPT) 20  16  15    Albumin/Globulin Ratio 1.6  1.7  1.6    BUN/Creatinine Ratio 13.2  11.9  14.9    Anion Gap 8.0  12.2  10.3      3/13/2024 esophagram-postsurgical changes " in the cervical spine.  Small Zenker's diverticulum.  Small paraesophageal hiatal hernia.  Postsurgical changes appreciated from previous hiatal hernia repair.  Barium tablet passed easily from the oral cavity into the stomach without delay.  Moderate spontaneous GERD.             Assessment and Plan              Diagnoses and all orders for this visit:    1. Esophagitis (Primary)  -     omeprazole (priLOSEC) 40 MG capsule; Take 1 capsule by mouth Daily.  Dispense: 90 capsule; Refill: 1    2. Paraesophageal hernia  -     omeprazole (priLOSEC) 40 MG capsule; Take 1 capsule by mouth Daily.  Dispense: 90 capsule; Refill: 1    3. Chronic idiopathic constipation            Follow Up     Follow Up   Return in about 9 months (around 4/24/2025) for GERD, constipation.  Patient was given instructions and counseling regarding her condition or for health maintenance advice. Please see specific information pulled into the AVS if appropriate.

## 2024-07-24 NOTE — PATIENT INSTRUCTIONS
Food Choices for Gastroesophageal Reflux Disease, Adult  When you have gastroesophageal reflux disease (GERD), the foods you eat and your eating habits are very important. Choosing the right foods can help ease your discomfort. Think about working with a food expert (dietitian) to help you make good choices.  What are tips for following this plan?  Reading food labels  Look for foods that are low in saturated fat. Foods that may help with your symptoms include:  Foods that have less than 5% of daily value (DV) of fat.  Foods that have 0 grams of trans fat.  Cooking  Do not mosqueda your food.  Cook your food by baking, steaming, grilling, or broiling. These are all methods that do not need a lot of fat for cooking.  To add flavor, try to use herbs that are low in spice and acidity.  Meal planning    Choose healthy foods that are low in fat, such as:  Fruits and vegetables.  Whole grains.  Low-fat dairy products.  Lean meats, fish, and poultry.  Eat small meals often instead of eating 3 large meals each day. Eat your meals slowly in a place where you are relaxed. Avoid bending over or lying down until 2-3 hours after eating.  Limit high-fat foods such as fatty meats or fried foods.  Limit your intake of fatty foods, such as oils, butter, and shortening.  Avoid the following as told by your doctor:  Foods that cause symptoms. These may be different for different people. Keep a food diary to keep track of foods that cause symptoms.  Alcohol.  Drinking a lot of liquid with meals.  Eating meals during the 2-3 hours before bed.  Lifestyle  Stay at a healthy weight. Ask your doctor what weight is healthy for you. If you need to lose weight, work with your doctor to do so safely.  Exercise for at least 30 minutes on 5 or more days each week, or as told by your doctor.  Wear loose-fitting clothes.  Do not smoke or use any products that contain nicotine or tobacco. If you need help quitting, ask your doctor.  Sleep with the head  of your bed higher than your feet. Use a wedge under the mattress or blocks under the bed frame to raise the head of the bed.  Chew sugar-free gum after meals.  What foods should eat?    Eat a healthy, well-balanced diet of fruits, vegetables, whole grains, low-fat dairy products, lean meats, fish, and poultry. Each person is different.  Foods that may cause symptoms in one person may not cause any symptoms in another person. Work with your doctor to find foods that are safe for you.  The items listed above may not be a complete list of what you can eat and drink. Contact a food expert for more options.  What foods should I avoid?  Limiting some of these foods may help in managing the symptoms of GERD. Everyone is different. Talk with a food expert or your doctor to help you find the exact foods to avoid, if any.  Fruits  Any fruits prepared with added fat. Any fruits that cause symptoms. For some people, this may include citrus fruits, such as oranges, grapefruit, pineapple, and alethea.  Vegetables  Deep-fried vegetables. French fries. Any vegetables prepared with added fat. Any vegetables that cause symptoms. For some people, this may include tomatoes and tomato products, chili peppers, onions and garlic, and horseradish.  Grains  Pastries or quick breads with added fat.  Meats and other proteins  High-fat meats, such as fatty beef or pork, hot dogs, ribs, ham, sausage, salami, and poe. Fried meat or protein, including fried fish and fried chicken. Nuts and nut butters, in large amounts.  Dairy  Whole milk and chocolate milk. Sour cream. Cream. Ice cream. Cream cheese. Milkshakes.  Fats and oils  Butter. Margarine. Shortening. Ghee.  Beverages  Coffee and tea, with or without caffeine. Carbonated beverages. Sodas. Energy drinks. Fruit juice made with acidic fruits, such as orange or grapefruit. Tomato juice. Alcoholic drinks.  Sweets and desserts  Chocolate and cocoa. Donuts.  Seasonings and condiments  Pepper.  Peppermint and spearmint. Added salt. Any condiments, herbs, or seasonings that cause symptoms. For some people, this may include san, hot sauce, or vinegar-based salad dressings.  The items listed above may not be a complete list of what you should not eat and drink. Contact a food expert for more options.  Questions to ask your doctor  Diet and lifestyle changes are often the first steps that are taken to manage symptoms of GERD. If diet and lifestyle changes do not help, talk with your doctor about taking medicines.  Where to find more information  [employer]: aboutgerd.org  Summary  When you have GERD, food and lifestyle choices are very important in easing your symptoms.  Eat small meals often instead of 3 large meals a day. Eat your meals slowly and in a place where you are relaxed.  Avoid bending over or lying down until 2-3 hours after eating.  Limit high-fat foods such as fatty meats or fried foods.  This information is not intended to replace advice given to you by your health care provider. Make sure you discuss any questions you have with your health care provider.  Document Revised: 06/28/2021 Document Reviewed: 06/28/2021  Elsevier Patient Education © 2024 [employer]

## 2024-08-09 ENCOUNTER — OFFICE VISIT (OUTPATIENT)
Dept: INTERNAL MEDICINE | Facility: CLINIC | Age: 64
End: 2024-08-09
Payer: MEDICARE

## 2024-08-09 VITALS
TEMPERATURE: 97.1 F | SYSTOLIC BLOOD PRESSURE: 116 MMHG | RESPIRATION RATE: 18 BRPM | DIASTOLIC BLOOD PRESSURE: 68 MMHG | HEIGHT: 66 IN | OXYGEN SATURATION: 97 % | WEIGHT: 143 LBS | BODY MASS INDEX: 22.98 KG/M2 | HEART RATE: 81 BPM

## 2024-08-09 DIAGNOSIS — F33.0 MAJOR DEPRESSIVE DISORDER, RECURRENT, MILD: ICD-10-CM

## 2024-08-09 DIAGNOSIS — R01.1 MURMUR, CARDIAC: Primary | ICD-10-CM

## 2024-08-09 DIAGNOSIS — G43.909 MIGRAINE WITHOUT STATUS MIGRAINOSUS, NOT INTRACTABLE, UNSPECIFIED MIGRAINE TYPE: ICD-10-CM

## 2024-08-09 DIAGNOSIS — E78.2 MIXED HYPERLIPIDEMIA: ICD-10-CM

## 2024-08-09 DIAGNOSIS — G47.00 INSOMNIA, UNSPECIFIED TYPE: ICD-10-CM

## 2024-08-09 DIAGNOSIS — E03.9 HYPOTHYROIDISM, UNSPECIFIED TYPE: ICD-10-CM

## 2024-08-09 PROCEDURE — G2211 COMPLEX E/M VISIT ADD ON: HCPCS | Performed by: NURSE PRACTITIONER

## 2024-08-09 PROCEDURE — 1126F AMNT PAIN NOTED NONE PRSNT: CPT | Performed by: NURSE PRACTITIONER

## 2024-08-09 PROCEDURE — 3044F HG A1C LEVEL LT 7.0%: CPT | Performed by: NURSE PRACTITIONER

## 2024-08-09 PROCEDURE — 1160F RVW MEDS BY RX/DR IN RCRD: CPT | Performed by: NURSE PRACTITIONER

## 2024-08-09 PROCEDURE — 1159F MED LIST DOCD IN RCRD: CPT | Performed by: NURSE PRACTITIONER

## 2024-08-09 PROCEDURE — 99214 OFFICE O/P EST MOD 30 MIN: CPT | Performed by: NURSE PRACTITIONER

## 2024-08-09 RX ORDER — VALACYCLOVIR HYDROCHLORIDE 500 MG/1
500 TABLET, FILM COATED ORAL EVERY OTHER DAY
Qty: 45 TABLET | Refills: 1 | Status: SHIPPED | OUTPATIENT
Start: 2024-08-09

## 2024-08-09 RX ORDER — VILAZODONE HYDROCHLORIDE 40 MG/1
40 TABLET ORAL DAILY
Qty: 90 TABLET | Refills: 1 | Status: SHIPPED | OUTPATIENT
Start: 2024-08-09

## 2024-08-09 RX ORDER — ERGOCALCIFEROL 1.25 MG/1
50000 CAPSULE ORAL
Qty: 5 CAPSULE | Status: CANCELLED | OUTPATIENT
Start: 2024-08-09

## 2024-08-09 RX ORDER — SIMVASTATIN 20 MG
20 TABLET ORAL NIGHTLY
Qty: 90 TABLET | Refills: 1 | Status: SHIPPED | OUTPATIENT
Start: 2024-08-09

## 2024-08-09 RX ORDER — LEVOTHYROXINE SODIUM 50 MCG
50 TABLET ORAL EVERY MORNING
Qty: 90 TABLET | Refills: 1 | Status: SHIPPED | OUTPATIENT
Start: 2024-08-09

## 2024-08-09 RX ORDER — QUETIAPINE FUMARATE 200 MG/1
200 TABLET, FILM COATED ORAL NIGHTLY
Qty: 90 TABLET | Refills: 1 | Status: SHIPPED | OUTPATIENT
Start: 2024-08-09

## 2024-08-09 NOTE — PROGRESS NOTES
"Chief Complaint  Dizziness (6 week follow up- better ) and Migraine    Subjective          Ann Perla presents to [facility]  History of Present Illness  Headache-she reports that emgality is working well. She reports having less severe headaches.    Depression-doing well with viibryd  Insomnia-sleeping well Seroquel    HLD-no leg cramps  Well controlled  Denies chest pain  Dizziness resolved    Murmur-she reports being told by previous pcp that she heard a murmur. She does not recall further workup with cardiology, echo  Denies syncope, near syncope    Objective   Vital Signs:   /68 (BP Location: Left arm, Patient Position: Sitting, Cuff Size: Adult)   Pulse 81   Temp 97.1 °F (36.2 °C)   Resp 18   Ht 167.6 cm (65.98\")   Wt 64.9 kg (143 lb)   SpO2 97%   BMI 23.09 kg/m²     Physical Exam  Vitals and nursing note reviewed.   Constitutional:       General: She is not in acute distress.     Appearance: Normal appearance.   HENT:      Head: Normocephalic and atraumatic.      Right Ear: External ear normal.      Left Ear: External ear normal.      Nose: Nose normal.      Mouth/Throat:      Mouth: Mucous membranes are moist.   Eyes:      Conjunctiva/sclera: Conjunctivae normal.   Cardiovascular:      Rate and Rhythm: Normal rate and regular rhythm.      Pulses: Normal pulses.      Heart sounds: Murmur heard.      No friction rub. No gallop.   Pulmonary:      Effort: Pulmonary effort is normal. No respiratory distress.      Breath sounds: No wheezing, rhonchi or rales.   Musculoskeletal:      Cervical back: Neck supple.      Right lower leg: No edema.      Left lower leg: No edema.   Skin:     General: Skin is warm and dry.   Neurological:      General: No focal deficit present.      Mental Status: She is alert and oriented to person, place, and time.   Psychiatric:         Mood and Affect: Mood normal.         Behavior: Behavior normal.        Result " Review :          Procedures      Assessment and Plan    Diagnoses and all orders for this visit:    1. Murmur, cardiac (Primary)  Comments:  No previous workup.  Sending for echo.  Discussed worrisome symptoms-syncope, shortness of breath, chest pain.  She will call with onset of these  Orders:  -     Adult Transthoracic Echo Complete w/ Color, Spectral and Contrast if necessary per protocol; Future  -     Comprehensive Metabolic Panel; Future  -     Lipid Panel; Future    2. Mixed hyperlipidemia  Comments:  Well-controlled, rechecking labs in 2 months prior to follow-up.  Orders:  -     Comprehensive Metabolic Panel; Future  -     Lipid Panel; Future    3. Hypothyroidism, unspecified type  Comments:  Well-controlled.  Rechecking labs prior to follow-up in 2 months  Orders:  -     TSH; Future    4. Migraine without status migrainosus, not intractable, unspecified migraine type  Comments:  Currently improved with Emgality.  Will continue with current meds at this time    5. Major depressive disorder, recurrent, mild  Comments:  Doing well with current medications.  Continue at this time    6. Insomnia, unspecified type  Comments:  Sleeping well with Seroquel currently.  Continue with sleep hygiene    Other orders  -     vilazodone (VIIBRYD) 40 MG tablet tablet; Take 1 tablet by mouth Daily.  Dispense: 90 tablet; Refill: 1  -     valACYclovir (Valtrex) 500 MG tablet; Take 1 tablet by mouth Every Other Day.  Dispense: 45 tablet; Refill: 1  -     Synthroid 50 MCG tablet; Take 1 tablet by mouth Every Morning.  Dispense: 90 tablet; Refill: 1  -     simvastatin (ZOCOR) 20 MG tablet; Take 1 tablet by mouth Every Night.  Dispense: 90 tablet; Refill: 1  -     QUEtiapine (SEROquel) 200 MG tablet; Take 1 tablet by mouth Every Night.  Dispense: 90 tablet; Refill: 1  -     Calcium Carbonate-Vitamin D 600-10 MG-MCG per tablet; Take 1 tablet by mouth Daily.  Dispense: 90 tablet; Refill: 1              Follow Up   Return in about 2  months (around 10/9/2024).  Patient was given instructions and counseling regarding her condition or for health maintenance advice. Please see specific information pulled into the AVS if appropriate.

## 2024-08-22 ENCOUNTER — OFFICE VISIT (OUTPATIENT)
Dept: UROLOGY | Facility: CLINIC | Age: 64
End: 2024-08-22
Payer: MEDICARE

## 2024-08-22 VITALS
TEMPERATURE: 98.2 F | HEART RATE: 67 BPM | BODY MASS INDEX: 23.21 KG/M2 | SYSTOLIC BLOOD PRESSURE: 122 MMHG | DIASTOLIC BLOOD PRESSURE: 75 MMHG | HEIGHT: 66 IN | WEIGHT: 144.4 LBS

## 2024-08-22 DIAGNOSIS — N32.81 OAB (OVERACTIVE BLADDER): ICD-10-CM

## 2024-08-22 DIAGNOSIS — R39.15 URINARY URGENCY: Primary | ICD-10-CM

## 2024-08-22 LAB
BACTERIA UR QL AUTO: NORMAL /HPF
BILIRUB BLD-MCNC: NEGATIVE MG/DL
CLARITY, POC: ABNORMAL
COLOR UR: YELLOW
EPI CELLS #/AREA URNS HPF: NORMAL /[HPF]
EXPIRATION DATE: ABNORMAL
GLUCOSE UR STRIP-MCNC: NEGATIVE MG/DL
KETONES UR QL: NEGATIVE
LEUKOCYTE EST, POC: ABNORMAL
Lab: ABNORMAL
NITRITE UR-MCNC: NEGATIVE MG/ML
PH UR: 7 [PH] (ref 5–8)
PROT UR STRIP-MCNC: NEGATIVE MG/DL
RBC # UR STRIP: NEGATIVE /UL
RBC # UR STRIP: NORMAL /HPF
RENAL EPITHELIAL, POC: 0
SP GR UR: 1.01 (ref 1–1.03)
UNIDENT CRYS URNS QL MICRO: NORMAL /HPF
UROBILINOGEN UR QL: ABNORMAL
WBC # UR STRIP: NORMAL /HPF

## 2024-08-22 RX ORDER — CALCIUM CARBONATE/VITAMIN D3 600 MG-10
TABLET ORAL
COMMUNITY
Start: 2024-08-09 | End: 2024-08-22 | Stop reason: SDUPTHER

## 2024-08-22 NOTE — PROGRESS NOTES
"Chief Complaint  Urinary Urgency (She states she is doing much better with gemtesa. She will need a refill)    Subjective no acute distress        Ann Perla presents to [facility]  History of Present Illness    63-year-old white female is here for evaluation of urinary urgency and overactive urinary bladder.  Since the patient has been on Gemtesa 75 mg daily she is doing fine.  Patient has no dysuria or gross hematuria.  No symptoms of UTI.  She has occasional urgency and occasional incontinence with a few drops.  She urinates about 5 times a day and gets up only once at night.  Overactive bladder assessment tool is 3/25.  No side effect from the medicine and patient is extremely pleased.    Objective no acute distress  Vital Signs:   /75 (BP Location: Right arm, Patient Position: Sitting, Cuff Size: Adult)   Pulse 67   Temp 98.2 °F (36.8 °C) (Temporal)   Ht 167.6 cm (65.98\")   Wt 65.5 kg (144 lb 6.4 oz)   BMI 23.32 kg/m²     No Known Allergies   Past medical history:  has a past medical history of Abdominal pain, LLQ (12/04/2015), Back pain, Bunion, Deep vein thrombosis, Depressive disorder, Disease of thyroid gland, Foot pain, right (04/08/2021), GERD (gastroesophageal reflux disease), Hallux valgus of right foot (12/03/2018), Hammer toe, Herpes genitalia, History of transfusion, HLD (hyperlipidemia), Migraine, Mixed incontinence urge and stress, OAB (overactive bladder) (01/24/2018), Seizures, and Urinary urgency.   Past surgical history:  has a past surgical history that includes Cervical fusion; Colonoscopy (2018, 2016); Cystoscopy; Esophagogastroduodenoscopy (2018); Foot surgery; Hand surgery (Left); Hand surgery (Right); Hiatal hernia repair (05/10/2019); Neck surgery; Retrograde pyelogram; Tubal ligation; Vein Surgery; Esophagogastroduodenoscopy (N/A, 08/28/2023); Colonoscopy (N/A, 08/28/2023); and Upper gastrointestinal endoscopy.  Personal history: family " history includes Breast cancer in her maternal aunt; Cancer in her maternal aunt, maternal grandfather, and other family members; Diabetes in her maternal grandmother; Heart disease in her brother; Hypertension in her maternal grandmother and mother; Kidney nephrosis in her brother; Lung cancer in an other family member; Stroke in her mother.  Social history:  reports that she has never smoked. She has never been exposed to tobacco smoke. She has never used smokeless tobacco. She reports that she does not currently use alcohol. She reports that she does not use drugs.    Review of Systems    Please see past medical and surgical history    Physical Exam   Result Review :                 Assessment and Plan    Diagnoses and all orders for this visit:    1. Urinary urgency (Primary)  -     POC Urinalysis Dipstick, Automated  -     Vibegron 75 MG tablet; Take 1 tablet by mouth Daily for 180 days.  Dispense: 90 tablet; Refill: 1  -     POC Urine Microscopic Only    2. OAB (overactive bladder)  -     Vibegron 75 MG tablet; Take 1 tablet by mouth Daily for 180 days.  Dispense: 90 tablet; Refill: 1  -     POC Urine Microscopic Only    Will continue Gemtesa 75 mg daily and recheck her in December to continue the medicine and refer her to urologist for follow-up.     Brief Urine Lab Results  (Last result in the past 365 days)        Color   Clarity   Blood   Leuk Est   Nitrite   Protein   CREAT   Urine HCG        08/22/24 0949 Yellow   Slightly Cloudy   Negative   Large (3+)   Negative   Negative                    Follow Up   No follow-ups on file.  Patient was given instructions and counseling regarding her condition or for health maintenance advice. Please see specific information pulled into the AVS if appropriate.     Lizz King MD

## 2024-08-26 ENCOUNTER — OFFICE VISIT (OUTPATIENT)
Dept: BEHAVIORAL HEALTH | Facility: CLINIC | Age: 64
End: 2024-08-26
Payer: MEDICARE

## 2024-08-26 VITALS
SYSTOLIC BLOOD PRESSURE: 113 MMHG | HEART RATE: 64 BPM | BODY MASS INDEX: 22.82 KG/M2 | WEIGHT: 142 LBS | DIASTOLIC BLOOD PRESSURE: 75 MMHG | HEIGHT: 66 IN

## 2024-08-26 DIAGNOSIS — G24.01 TARDIVE DYSKINESIA: ICD-10-CM

## 2024-08-26 DIAGNOSIS — F41.1 GENERALIZED ANXIETY DISORDER: ICD-10-CM

## 2024-08-26 DIAGNOSIS — F51.01 PRIMARY INSOMNIA: ICD-10-CM

## 2024-08-26 DIAGNOSIS — T50.905A DRUG-INDUCED TARDIVE DYSTONIA: ICD-10-CM

## 2024-08-26 DIAGNOSIS — F33.0 MILD EPISODE OF RECURRENT MAJOR DEPRESSIVE DISORDER: Primary | ICD-10-CM

## 2024-08-26 DIAGNOSIS — G24.09 DRUG-INDUCED TARDIVE DYSTONIA: ICD-10-CM

## 2024-08-26 PROCEDURE — 1159F MED LIST DOCD IN RCRD: CPT

## 2024-08-26 PROCEDURE — 90792 PSYCH DIAG EVAL W/MED SRVCS: CPT

## 2024-08-26 PROCEDURE — 1160F RVW MEDS BY RX/DR IN RCRD: CPT

## 2024-08-26 RX ORDER — TRAZODONE HYDROCHLORIDE 50 MG/1
50 TABLET, FILM COATED ORAL NIGHTLY
Qty: 30 TABLET | Refills: 1 | Status: SHIPPED | OUTPATIENT
Start: 2024-08-26

## 2024-08-26 RX ORDER — QUETIAPINE FUMARATE 50 MG/1
TABLET, FILM COATED ORAL
Qty: 14 TABLET | Refills: 0 | Status: SHIPPED | OUTPATIENT
Start: 2024-08-26

## 2024-08-26 NOTE — PROGRESS NOTES
"[facility]  Initial Psychiatric Evaluation    Referring Provider:   Thank you   Usha Bonilla, APRN  [address]  [facility] [address]  [place] [address]  Your referral is greatly appreciated.    Vital Signs:   /75   Pulse 64   Ht 167.6 cm (65.98\")   Wt 64.4 kg (142 lb)   BMI 22.93 kg/m²      Chief Complaint: Depression. Anxiety.     History of Present Illness:   Ann Perla is a 63 y.o. female who presents today for initial psychiatric evaluation for:     ICD-10-CM ICD-9-CM   1. Mild episode of recurrent major depressive disorder  F33.0 296.31   2. Generalized anxiety disorder  F41.1 300.02   3. Tardive dyskinesia  G24.01 333.85   4. Drug-induced tardive dystonia  G24.09 333.72    T50.905A    5. Primary insomnia  F51.01 307.42       08/26/2024   Depression  Patient endorses gradually worsening feelings of sadness, low mood, and loss of interest in usual activities. These feelings are accompanied by anhedonia, fatigue and low energy most days, feeling worthless, guilty, and hopeless. Describes an inability to focus and concentrate that may interfere with daily tasks at home, work, or school. Movements that are unusually slow or agitated (a change which is often noticeable to others). Denies thinking about death and dying, suicidal ideation, planning, or intent to self-harm. These symptoms cause the patient clinically significant distress or impairment in social, occupational, or other important areas of functioning.  PHQ-9 is 9.  Generalized Anxiety Disorder (NELLA)   Patient experiences excessive anxiety and worry (apprehensive expectation), occurring more days than not for at least 6 months, about a number of events or activities (such as work or school performance). Finds it difficult to control the worry. The anxiety and worry are associated with restlessness or feeling keyed up or on edge, being easily fatigued, difficulty concentrating or mind going blank, irritability, muscle " tension, and sleep disturbance (difficulty falling or staying asleep, or restless, unsatisfying sleep). The anxiety, worry, or physical symptoms cause clinically significant distress or impairment in social, occupational, or other important areas of functioning.   NELLA-7 is 12.  Insomnia  Sleep study several years ago, negative CELE. Sleep difficulty has been present at least 3 nights per week and for a period of at least 3 months. Patient experiences challenges difficulty falling asleep (onset insomnia) with inability to fall asleep beyond 20-30 minutes and inability to maintain sleep (maintenance/middle insomnia) , which occurs even under ideal circumstances. Is unable to sleep even with ample opportunity. Has been taking quetiapine for a few years but has been developing symptoms of TD.   Tardive Dyskinesia  Patient presents with primary symptoms of tardive dyskinesia which include loss of control of muscles, especially of face, arms, and legs. Mild repetitive involuntary movements noted:  Grimacing, Puckering and pursing of lips, lip smacking  Jerking of arms and legs    Per Referring Provider 6/27/2024 Ann Perla is also being seen today for   Headache-started emgality last week, weaned from topamax  Stopped 3 days ago  GERD-improved with omeprazole  Right eye concern-patient reports that her grandson noticed a red spot in her right eye.  She denies any pain with this.  Dizziness at times int he last 2-3 wks  She reports rooming spinning intermittently. Hx of vertigo. Vertigo sx have improved. Feeling off balance. Still on propanolol for migraine prevention. Low bp in the morning. Dizziness worse with position changes.     Depression/Anxiety-long hx of this. She has been on seroquel and viibryd  Denies SI/HI currently  She previously saw escobar    Past Psychiatric History:  Began Treatment: Adulthood  Diagnoses: Depression, anxiety  Psychiatrist: Yes  Therapist: Denies  Admission History:  Denies  Medication Trials: quetiapine, viibryd, zoloft, prozac, wellbutrin, topomax  Family history suicide attempts: Denies  Family history suicide completions: Denies  Suicide Attempts: Denies  Self Harm: Denies  Substance use/abuse:Denies  Withdrawal Symptoms: Not applicable  Longest Period Sober: Not applicable  AA: Not applicable  Trauma/Childhood/ACE: Worked really hard all through childhood, farm work. Denies abuse, neglect, or major adverse events  Access to Firearms: Yes, safe, secured    Safety/Risk Assessment: Risk of self-harm acutely and chronically is mild.    Static/Dynamic risk factors include diagnosis of mental disorder, psychosocial stressors,Recent stressor or loss.    Record Review for 08/26/2024 : I have thoroughly reviewed the patient's electronic medical record to include previous encounters, care everywhere, notes, medications, labs, RONY and UDS, imaging, and EKG's.  Pertinent information is included in this note.  7/10/2024 Glucose 132  CMP is otherwise reassuring  EKG Results:  None in record  Head Imaging:  None in record    MENTAL STATUS EXAM   General Appearance:  Cleanly groomed and dressed and well developed  Eye Contact:  Good eye contact  Attitude:  Cooperative and polite  Motor Activity:  Normal gait, posture  Speech:  Normal rate, tone, volume  Mood and affect:  Normal, pleasant and euthymic  Hopelessness:  Denies  Thought Process:  Logical and goal-directed  Associations/ Thought Content:  No delusions  Hallucinations:  None  Suicidal Ideations:  Not present  Homicidal Ideation:  Not present  Sensorium:  Alert  Orientation:  Person, place, time and situation  Immediate Recall, Recent, and Remote Memory:  Intact  Attention Span/ Concentration:  Good  Fund of Knowledge:  Appropriate for age and educational level  Intellectual Functioning:  Average range  Insight:  Good  Judgement:  Good  Reliability:  Good  Impulse Control:  Good     PHQ-9 Depression Screening  PHQ-9 Total  Score: 9    Little interest or pleasure in doing things? 1-->several days   Feeling down, depressed, or hopeless? 2-->more than half the days   Trouble falling or staying asleep, or sleeping too much? 0-->not at all   Feeling tired or having little energy? 2-->more than half the days   Poor appetite or overeating? 0-->not at all   Feeling bad about yourself - or that you are a failure or have let yourself or your family down? 2-->more than half the days   Trouble concentrating on things, such as reading the newspaper or watching television? 1-->several days   Moving or speaking so slowly that other people could have noticed? Or the opposite - being so fidgety or restless that you have been moving around a lot more than usual? 1-->several days   Thoughts that you would be better off dead, or of hurting yourself in some way? 0-->not at all   PHQ-9 Total Score 9     NELLA-7  Feeling nervous, anxious or on edge: Several days  Not being able to stop or control worrying: More than half the days  Worrying too much about different things: More than half the days  Trouble Relaxing: More than half the days  Being so restless that it is hard to sit still: Several days  Feeling afraid as if something awful might happen: More than half the days  Becoming easily annoyed or irritable: More than half the days  NELLA 7 Total Score: 12  If you checked any problems, how difficult have these problems made it for you to do your work, take care of things at home, or get along with other people: Somewhat difficult  Review of systems is negative except for as noted in HPI.  Labs:  WBC   Date Value Ref Range Status   05/31/2024 5.38 3.40 - 10.80 10*3/mm3 Final     Platelets   Date Value Ref Range Status   05/31/2024 266 140 - 450 10*3/mm3 Final     Hemoglobin   Date Value Ref Range Status   05/31/2024 13.2 12.0 - 15.9 g/dL Final     Hematocrit   Date Value Ref Range Status   05/31/2024 40.1 34.0 - 46.6 % Final     Glucose   Date Value Ref Range  Status   07/10/2024 132 (H) 65 - 99 mg/dL Final     Creatinine   Date Value Ref Range Status   07/10/2024 0.87 0.57 - 1.00 mg/dL Final     ALT (SGPT)   Date Value Ref Range Status   07/10/2024 15 1 - 33 U/L Final     AST (SGOT)   Date Value Ref Range Status   07/10/2024 19 1 - 32 U/L Final     BUN   Date Value Ref Range Status   07/10/2024 13 8 - 23 mg/dL Final     eGFR   Date Value Ref Range Status   07/10/2024 75.0 >60.0 mL/min/1.73 Final     Total Cholesterol   Date Value Ref Range Status   05/31/2024 133 0 - 200 mg/dL Final     Triglycerides   Date Value Ref Range Status   05/31/2024 133 0 - 150 mg/dL Final     HDL Cholesterol   Date Value Ref Range Status   05/31/2024 45 40 - 60 mg/dL Final     LDL Cholesterol    Date Value Ref Range Status   05/31/2024 65 0 - 100 mg/dL Final     VLDL Cholesterol   Date Value Ref Range Status   05/31/2024 23 5 - 40 mg/dL Final     LDL/HDL Ratio   Date Value Ref Range Status   05/31/2024 1.36  Final     Hemoglobin A1C   Date Value Ref Range Status   05/31/2024 5.60 4.80 - 5.60 % Final     TSH   Date Value Ref Range Status   05/31/2024 0.717 0.270 - 4.200 uIU/mL Final     Free T4   Date Value Ref Range Status   05/18/2020 1.2 0.9 - 1.8 ng/dL Final     Last Urine Toxicity           No data to display               Imaging Results:  MRI Hand Right Without Contrast    Result Date: 7/2/2024  Impression: 1.1.6 cm x 0.9 cm focus of heterotopic ossification distal to the distal ulna extending to and mechanically eroding the hamate and triquetrum. Finding is suspected to be secondary to gout. 2.Subtle erosive changes involving the second and third metacarpal heads. 3.Focus of intermediate signal abnormality in the soft tissues radial to the distal scaphoid. 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 PVNS could also produce this appearance but is felt less likely given other findings. 4.Tears of the TFC and scapholunate ligaments. 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 Electronically Signed: Anurag Alaniz MD  7/2/2024 2:10 PM EDT  Workstation ID: [id number]    DEXA Bone Density Axial    Result Date: 6/14/2024  Impression: Osteopenia -Based upon the FRAX score, patient does not meet criteria for initiation of pharmacological treatment recommendations for prevention of osteoporosis per the National Osteoporosis Foundation (NOF) guidelines. However, individualized treatment decisions should be made accounting for additional clinical factors. Report dictated by: Charlee Edwards  I have personally reviewed this case and agree with the findings above: Electronically Signed: Anurag Alaniz MD  6/14/2024 3:44 PM EDT  Workstation ID: [id number]    Mammo Screening Digital Tomosynthesis Bilateral With CAD    Result Date: 5/17/2024  No mammographic signs of malignancy. Recommend routine mammographic screening.  BI-RADS ASSESSMENT: BI-RADS 1. Negative.  The patient's information is entered into a computerized reminder system with a targeted due date for the next mammogram.  Note:  It has been reported that there is approximately a 15% false negative in mammography.  Therefore, management of a palpable abnormality should not be deferred because of a negative mammogram.     Electronically Signed By-Franklin Valle MD On:5/17/2024 5:18 PM      Allergy:   No Known Allergies   Problem List:  Patient Active Problem List   Diagnosis    Cervical disc herniation    Chronic kidney disease, stage 2 (mild)    Degenerative arthritis of thumb    Depression    Hx of blood clots    Lower urinary tract infectious disease    Multiple allergies    Trigger thumb of left hand    Trigger thumb of right hand    Osteoporosis    Chronic idiopathic constipation    Heartburn    Esophageal dysphagia    Cervicalgia    Bilateral occipital neuralgia    Menopause    Osteopenia of multiple sites    Genitourinary syndrome of menopause    Decreased libido    Hypoactive sexual  desire disorder    Cubital tunnel syndrome on right    Complex tear of triangular fibrocartilage of right wrist    Heterotopic ossification    Wrist arthritis    Hand arthritis    Murmur, cardiac    Mixed hyperlipidemia    Hypothyroidism     Current Medications:   Current Outpatient Medications   Medication Sig Dispense Refill    Calcium Carbonate-Vitamin D 600-10 MG-MCG per tablet Take 1 tablet by mouth Daily. 90 tablet 1    estradiol (ESTRACE VAGINAL) 0.1 MG/GM vaginal cream Place 0.5 gm PV and massage 0.5 gm to vulva and vestibule at night 2x/week 42.5 g 3    galcanezumab-gnlm (EMGALITY) 120 MG/ML auto-injector pen Inject 1 mL under the skin into the appropriate area as directed Every 30 (Thirty) Days. 1 mL 5    Ibuprofen 3 %, Gabapentin 10 %, Baclofen 2 %, lidocaine 4 %, Ketamine HCl 4 % Apply 1-2 g topically to the appropriate area as directed 3 (Three) to 4 (Four) times daily. 90 g 0    Lifitegrast (Xiidra) 5 % ophthalmic solution Administer 1 drop to both eyes 2 (Two) Times a Day.      Linzess 72 MCG capsule capsule Take 1 capsule by mouth Every Morning Before Breakfast. 90 capsule 1    omeprazole (priLOSEC) 40 MG capsule Take 1 capsule by mouth Daily. 90 capsule 1    polyethylene glycol (MIRALAX) 17 GM/SCOOP powder       PreviDent 5000 Plus 1.1 % cream As Needed.      simethicone (Mi-Acid Gas Relief) 80 MG chewable tablet 1 tab(s) chewed 4 times a day PRN for 30 day(s)      simvastatin (ZOCOR) 20 MG tablet Take 1 tablet by mouth Every Night. 90 tablet 1    Synthroid 50 MCG tablet Take 1 tablet by mouth Every Morning. 90 tablet 1    testosterone (Testim) 50 MG/5GM (1%) gel gel Place entirety of one tube of testim in a 5 ml syringe.  Apply 0.5 ml to back of the knee daily 15 g 5    valACYclovir (Valtrex) 500 MG tablet Take 1 tablet by mouth Every Other Day. 45 tablet 1    Vibegron 75 MG tablet Take 1 tablet by mouth Daily for 180 days. 90 tablet 1    vilazodone (VIIBRYD) 40 MG tablet tablet Take 1 tablet by  mouth Daily. 90 tablet 1    QUEtiapine (SEROquel) 50 MG tablet Take 50 mg daily for 7 days, then decrease to 25 mg for 7 days, then stop. 14 tablet 0    traZODone (DESYREL) 50 MG tablet Take 1 tablet by mouth Every Night. 30 tablet 1     No current facility-administered medications for this visit.     Discontinued Medications:  Medications Discontinued During This Encounter   Medication Reason    QUEtiapine (SEROquel) 200 MG tablet Dose adjustment     Past Surgical History:  Past Surgical History:   Procedure Laterality Date    CERVICAL FUSION      COLONOSCOPY  2018, 2016    COLONOSCOPY N/A 08/28/2023    Procedure: COLONOSCOPY WITH POLYPECTOMIES/HOT SNARE WITH ELEVIEW INJECTION AND CLIP APPLICATION X 2;  Surgeon: Jeanette Mcmahon MD;  Location: [facility] ENDOSCOPY;  Service: Gastroenterology;  Laterality: N/A;  COLON POLYPS, DIVERTICULOSIS, HEMORRHOIDS    CYSTOSCOPY      CYSTOSCOPY RETROGRADE PYELOGRAM      ENDOSCOPY  2018    ENDOSCOPY N/A 08/28/2023    Procedure: ESOPHAGOGASTRODUODENOSCOPY WITH BIOPSIES AND ESOPHAGEAL DILATION TO 18 MM;  Surgeon: Jeanette Mcmahon MD;  Location: [facility] ENDOSCOPY;  Service: Gastroenterology;  Laterality: N/A;  ESOPHAGITIS    FOOT SURGERY      HAND SURGERY Left     HAND SURGERY Right     HIATAL HERNIA REPAIR  05/10/2019    NECK SURGERY      TUBAL ABDOMINAL LIGATION      UPPER GASTROINTESTINAL ENDOSCOPY      VEIN SURGERY       Past Medical History:  Past Medical History:   Diagnosis Date    Abdominal pain, LLQ 12/04/2015    Back pain     Bunion     Deep vein thrombosis     Depressive disorder     Disease of thyroid gland     Foot pain, right 04/08/2021    GERD (gastroesophageal reflux disease)     Hallux valgus of right foot 12/03/2018    Hammer toe     Herpes genitalia     History of transfusion     HLD (hyperlipidemia)     Migraine     Mixed incontinence urge and stress     OAB (overactive bladder) 01/24/2018    Seizures     Urinary urgency      Social History      Socioeconomic History    Marital status:     Number of children: 2   Tobacco Use    Smoking status: Never     Passive exposure: Never    Smokeless tobacco: Never   Vaping Use    Vaping status: Never Used   Substance and Sexual Activity    Alcohol use: Not Currently     Comment: former- 7-8-19    Drug use: Never    Sexual activity: Not Currently     Family History   Problem Relation Age of Onset    Stroke Mother     Hypertension Mother     Heart disease Brother     Kidney nephrosis Brother     Hypertension Maternal Grandmother     Diabetes Maternal Grandmother     Cancer Maternal Grandfather         Unspecified    Cancer Maternal Aunt     Breast cancer Maternal Aunt     Cancer Other         Unspecified    Cancer Other         Unspecified    Lung cancer Other     Colon cancer Neg Hx      Social History:  Martial Status:  x1, , 15 years, healthy  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 Mild episode of recurrent major depressive disorder (Primary)  -     QUEtiapine (SEROquel) 50 MG tablet; Take 50 mg daily for 7 days, then decrease to 25 mg for 7 days, then stop.  Dispense: 14 tablet; Refill: 0  -     traZODone (DESYREL) 50 MG tablet; Take 1 tablet by mouth Every Night.  Dispense: 30 tablet; Refill: 1    2. Generalized anxiety disorder  -     QUEtiapine (SEROquel) 50 MG tablet; Take 50 mg daily for 7 days, then decrease to 25 mg for 7 days, then stop.  Dispense: 14 tablet; Refill: 0  -     traZODone (DESYREL) 50 MG tablet; Take 1 tablet by mouth Every Night.  Dispense: 30 tablet; Refill: 1    3. Tardive dyskinesia    4. Drug-induced tardive dystonia    5.  Primary insomnia  -     QUEtiapine (SEROquel) 50 MG tablet; Take 50 mg daily for 7 days, then decrease to 25 mg for 7 days, then stop.  Dispense: 14 tablet; Refill: 0  -     traZODone (DESYREL) 50 MG tablet; Take 1 tablet by mouth Every Night.  Dispense: 30 tablet; Refill: 1       Decrease quetiapine to 100 mg daily for 7 days, then decrease to 50 mg daily for 7 days, then decrease to 25 mg for 7 days, then stop.  Start trazodone 25 mg at bedtime for 7 days, then increase to 50 mg at bedtime  Continue viibryd 40 mg daily  Follow-up 1 month  Medication Education:   SEROQUEL (QUETIAPINE) Risks, benefits, alternatives discussed with patient including nausea and vomiting, GI upset, sedation, dizziness, falls, akathisia, hypotension, increased appetite, lowering of seizure threshold, theoretical risk of tardive dyskinesia, extrapyramidal symptoms, restless legs syndrome. After discussion of these risks and benefits, the patient voiced understanding and agreed to proceed.  VIIBRYD (VILAZODONE) Start Viibryd 10 mg by mouth daily in the morning with food for 7 days, then 20 mg by mouth daily in the morning with food to target depressed mood and anxiety. Risks, benefits, alternatives discussed with patient including diarrhea, nausea, vomiting, dry mouth and insomnia. After discussion of these risks and benefits, the patient voiced understanding and agreed to proceed.    DESYREL (TRAZODONE) Risks, benefits, side effects discussed with patient including GI upset, sedation, dizziness/falls risk, grogginess the following day, prolongation of the QTc interval.  After discussion of these risks and benefits, the patient voiced understanding and agreed to proceed.        Medications:   New Medications Ordered This Visit   Medications    QUEtiapine (SEROquel) 50 MG tablet     Sig: Take 50 mg daily for 7 days, then decrease to 25 mg for 7 days, then stop.     Dispense:  14 tablet     Refill:  0     Tapered to discontinue quetiapine.  Thx 4 All U Do!    traZODone (DESYREL) 50 MG tablet     Sig: Take 1 tablet by mouth Every Night.     Dispense:  30 tablet     Refill:  1      RONY reviewed.   Discussed medication options and treatment plan of prescribed medication as well as the risks, benefits, and side effects.  Patient is agreeable to call the office with any worsening of symptoms or onset of side effects.   Patient is agreeable to call 911 or go to the nearest ER should he/she begin having SI/HI.   Patient acknowledged, is agreeable to continue with current treatment plan, and was educated on the importance of compliance with treatment and follow-up appointments.  Addressed all questions and concerns.    Psychotherapy:    Will continue therapy at future encounters.   Functional status: Moderate symptoms OR moderate difficulty in social occupational or social functioning (51-60)  Prognosis: Fair with expectation for some response to treatment  Progress: Will address progress at follow-up visits.    Follow-up: Return in about 3 weeks (around 9/16/2024).       This document has been electronically signed by LAURENCE Carter  August 26, 2024 22:18 EDT    Please note that portions of this note were completed with a voice recognition program.  Copied text in this note has been reviewed and is accurate as of 08/26/24

## 2024-08-26 NOTE — PATIENT INSTRUCTIONS
1.  Please return to clinic at your next scheduled visit.  Please contact the clinic ([phone]) at least 24 hours prior in the event you need to cancel.  2.  Do no harm to yourself or others.    3.  Avoid alcohol and drugs.    4.  Take all medications as prescribed.  Please contact the clinic with any concerns. If you are in need of medication refills, please call the clinic at [phone].    5. Should you want to get in touch with your provider, LAURENCE Carter, please contact the office ([phone]), and staff will be able to page Rosa directly.  6. In the event you have personal crisis, contact the following crisis numbers: Suicide Prevention Hotline 1-130.790.6618; [facility] Helpline 2-343-221-OBVV; [facility] Emergency Room [phone]; text HELLO to 958472; or 546.     SPECIFIC RECOMMENDATIONS:     1.      Medications discussed at this encounter:   Decrease quetiapine to 100 mg daily for 7 days, then decrease to 50 mg daily for 7 days, then decrease to 25 mg for 7 days, then stop.  Start trazodone 25 mg at bedtime for 7 days, then increase to 50 mg at bedtime  Continue viibryd 40 mg daily  Follow-up 1 month    New Medications Ordered This Visit   Medications    QUEtiapine (SEROquel) 50 MG tablet     Sig: Take 50 mg daily for 7 days, then decrease to 25 mg for 7 days, then stop.     Dispense:  14 tablet     Refill:  0     Tapered to discontinue quetiapine. Thx 4 All U Do!    traZODone (DESYREL) 50 MG tablet     Sig: Take 1 tablet by mouth Every Night.     Dispense:  30 tablet     Refill:  1                       2.      Psychotherapy recommendations: We will continue therapy at future visits.     3.     Return to clinic: Return in about 3 weeks (around 9/16/2024).

## 2024-08-27 ENCOUNTER — HOSPITAL ENCOUNTER (OUTPATIENT)
Dept: CARDIOLOGY | Facility: HOSPITAL | Age: 64
Discharge: HOME | End: 2024-08-27
Admitting: NURSE PRACTITIONER
Payer: MEDICARE

## 2024-08-27 DIAGNOSIS — R01.1 MURMUR, CARDIAC: ICD-10-CM

## 2024-08-27 LAB
AORTIC DIMENSIONLESS INDEX: 1.02 (DI)
ASCENDING AORTA: 3 CM
BH CV ECHO MEAS - ACS: 1.83 CM
BH CV ECHO MEAS - AO MAX PG: 10.3 MMHG
BH CV ECHO MEAS - AO MEAN PG: 6 MMHG
BH CV ECHO MEAS - AO ROOT DIAM: 3.3 CM
BH CV ECHO MEAS - AO V2 MAX: 160.7 CM/SEC
BH CV ECHO MEAS - AO V2 VTI: 30.5 CM
BH CV ECHO MEAS - AVA(I,D): 3.9 CM2
BH CV ECHO MEAS - EDV(CUBED): 41.6 ML
BH CV ECHO MEAS - EDV(MOD-SP2): 57.8 ML
BH CV ECHO MEAS - EDV(MOD-SP4): 62.3 ML
BH CV ECHO MEAS - EF(MOD-BP): 64.2 %
BH CV ECHO MEAS - EF(MOD-SP2): 64.7 %
BH CV ECHO MEAS - EF(MOD-SP4): 63.6 %
BH CV ECHO MEAS - ESV(CUBED): 11.7 ML
BH CV ECHO MEAS - ESV(MOD-SP2): 20.4 ML
BH CV ECHO MEAS - ESV(MOD-SP4): 22.7 ML
BH CV ECHO MEAS - FS: 34.4 %
BH CV ECHO MEAS - IVS/LVPW: 1.67 CM
BH CV ECHO MEAS - IVSD: 1.5 CM
BH CV ECHO MEAS - LA DIMENSION: 2.7 CM
BH CV ECHO MEAS - LAT PEAK E' VEL: 7.1 CM/SEC
BH CV ECHO MEAS - LV MASS(C)D: 169.6 GRAMS
BH CV ECHO MEAS - LV MAX PG: 11.6 MMHG
BH CV ECHO MEAS - LV MEAN PG: 9.3 MMHG
BH CV ECHO MEAS - LV V1 MAX: 170.3 CM/SEC
BH CV ECHO MEAS - LV V1 VTI: 31.4 CM
BH CV ECHO MEAS - LVIDD: 3.5 CM
BH CV ECHO MEAS - LVIDS: 2.27 CM
BH CV ECHO MEAS - LVOT AREA: 3.8 CM2
BH CV ECHO MEAS - LVOT DIAM: 2.21 CM
BH CV ECHO MEAS - LVPWD: 1.05 CM
BH CV ECHO MEAS - MED PEAK E' VEL: 5.4 CM/SEC
BH CV ECHO MEAS - MR MAX PG: 144.4 MMHG
BH CV ECHO MEAS - MR MAX VEL: 600.9 CM/SEC
BH CV ECHO MEAS - MV A MAX VEL: 101.1 CM/SEC
BH CV ECHO MEAS - MV DEC SLOPE: 410 CM/SEC2
BH CV ECHO MEAS - MV DEC TIME: 0.28 SEC
BH CV ECHO MEAS - MV E MAX VEL: 92.5 CM/SEC
BH CV ECHO MEAS - MV E/A: 0.91
BH CV ECHO MEAS - MV MAX PG: 6 MMHG
BH CV ECHO MEAS - MV MEAN PG: 2.6 MMHG
BH CV ECHO MEAS - MV P1/2T: 86.3 MSEC
BH CV ECHO MEAS - MV V2 VTI: 32.7 CM
BH CV ECHO MEAS - MVA(P1/2T): 2.5 CM2
BH CV ECHO MEAS - MVA(VTI): 3.7 CM2
BH CV ECHO MEAS - PA V2 MAX: 92.3 CM/SEC
BH CV ECHO MEAS - PULM A REVS DUR: 0.16 SEC
BH CV ECHO MEAS - PULM A REVS VEL: 27.9 CM/SEC
BH CV ECHO MEAS - PULM DIAS VEL: 35.1 CM/SEC
BH CV ECHO MEAS - PULM S/D: 1.71
BH CV ECHO MEAS - PULM SYS VEL: 60 CM/SEC
BH CV ECHO MEAS - QP/QS: 0.33
BH CV ECHO MEAS - RAP SYSTOLE: 5 MMHG
BH CV ECHO MEAS - RV MAX PG: 1.61 MMHG
BH CV ECHO MEAS - RV V1 MAX: 63.4 CM/SEC
BH CV ECHO MEAS - RV V1 VTI: 13.6 CM
BH CV ECHO MEAS - RVDD: 2.9 CM
BH CV ECHO MEAS - RVOT DIAM: 1.93 CM
BH CV ECHO MEAS - RVSP: 30 MMHG
BH CV ECHO MEAS - SV(LVOT): 120.2 ML
BH CV ECHO MEAS - SV(MOD-SP2): 37.4 ML
BH CV ECHO MEAS - SV(MOD-SP4): 39.6 ML
BH CV ECHO MEAS - SV(RVOT): 39.6 ML
BH CV ECHO MEAS - TAPSE (>1.6): 1.5 CM
BH CV ECHO MEAS - TR MAX PG: 25 MMHG
BH CV ECHO MEAS - TR MAX VEL: 249.8 CM/SEC
BH CV ECHO MEASUREMENTS AVERAGE E/E' RATIO: 14.8
BH CV XLRA - TDI S': 11.1 CM/SEC
IVRT: 55 MS
LEFT ATRIUM VOLUME INDEX: 22.9 ML/M2

## 2024-08-27 PROCEDURE — 93306 TTE W/DOPPLER COMPLETE: CPT

## 2024-09-04 ENCOUNTER — TELEPHONE (OUTPATIENT)
Dept: INTERNAL MEDICINE | Facility: CLINIC | Age: 64
End: 2024-09-04
Payer: MEDICARE

## 2024-09-04 DIAGNOSIS — F41.1 GENERALIZED ANXIETY DISORDER: ICD-10-CM

## 2024-09-04 DIAGNOSIS — F33.0 MILD EPISODE OF RECURRENT MAJOR DEPRESSIVE DISORDER: ICD-10-CM

## 2024-09-04 DIAGNOSIS — F51.01 PRIMARY INSOMNIA: ICD-10-CM

## 2024-09-04 RX ORDER — PROPRANOLOL HCL 60 MG
60 CAPSULE, EXTENDED RELEASE 24HR ORAL DAILY
Qty: 90 CAPSULE | Refills: 1 | Status: SHIPPED | OUTPATIENT
Start: 2024-09-04

## 2024-09-04 NOTE — TELEPHONE ENCOUNTER
Caller: Ann Perla    Relationship: Self    Best call back number: [phone]    Which medication are you concerned about: CALLER STATED THAT THE MEDICATION CHANGE WITH TRAZODONE AND SEROQUEL     Who prescribed you this medication: ARCELIA VILLELA    When did you start taking this medication: 8/11/24    What are your concerns: CALLER STATED THAT SHE HAS NOT BEEN ABLE TO SLEEP     How long have you had these concerns: 2 WEEKS

## 2024-09-04 NOTE — TELEPHONE ENCOUNTER
Caller: Ann Perla    Relationship: Self    Best call back number: [phone]     What medication are you requesting: PROPANOLOL 60 MG    What are your current symptoms: MIGRAINES    How long have you been experiencing symptoms: YEARS     Have you had these symptoms before:    [x] Yes  [] No    Have you been treated for these symptoms before:   [x] Yes  [] No    If a prescription is needed, what is your preferred pharmacy and phone number: [address] [phone] [facility] [phone]      Additional notes:CALLER STATED THAT THIS MEDICATION WAS PROVIDED BY LAURENCE GARCIA AND REQUESTING ARCELIA VILLELA PROVIDE REFILLS.

## 2024-09-04 NOTE — TELEPHONE ENCOUNTER
Spoke with patient who stated she has not been sleeping well at all. She is taking the Trazodone 50 MG once at night and then she is taking Seroquel 50 MG at night as well. She is weaning off the Seroquel. Patient wants to know what she can do to  help her sleep since these medications are helping. Patient was started on the Trazodone and is weaning off the Seroquel.

## 2024-09-06 RX ORDER — TRAZODONE HYDROCHLORIDE 50 MG/1
100 TABLET, FILM COATED ORAL NIGHTLY
Qty: 60 TABLET | Refills: 1 | Status: SHIPPED | OUTPATIENT
Start: 2024-09-06 | End: 2024-09-09 | Stop reason: SDUPTHER

## 2024-09-06 NOTE — TELEPHONE ENCOUNTER
Phoned patient, we are in agreement to continue with taper off quetiapine. We will increase trazodone to 100 mg at bedtime, informed patient she may take up to 150 mg of trazodone. We will discuss response and any alternative treatment on Appointment with Rosa Larson APRN (09/17/2024). Patient is agreeable to phone with any further questions/concerns. Medication sent to patient's preferred pharmacy in record.    LAURENCE Carter, PMHNP-BC  [facility]  Office: [phone]

## 2024-09-09 ENCOUNTER — TELEPHONE (OUTPATIENT)
Dept: INTERNAL MEDICINE | Facility: CLINIC | Age: 64
End: 2024-09-09
Payer: MEDICARE

## 2024-09-09 DIAGNOSIS — F33.0 MILD EPISODE OF RECURRENT MAJOR DEPRESSIVE DISORDER: ICD-10-CM

## 2024-09-09 DIAGNOSIS — F41.1 GENERALIZED ANXIETY DISORDER: ICD-10-CM

## 2024-09-09 DIAGNOSIS — F51.01 PRIMARY INSOMNIA: ICD-10-CM

## 2024-09-09 RX ORDER — TRAZODONE HYDROCHLORIDE 50 MG/1
100 TABLET, FILM COATED ORAL NIGHTLY
Qty: 60 TABLET | Refills: 1 | Status: SHIPPED | OUTPATIENT
Start: 2024-09-09

## 2024-09-09 NOTE — TELEPHONE ENCOUNTER
Caller: Ann Perla    Relationship: Self    Best call back number: [phone]     What is the best time to reach you: ANY    Who are you requesting to speak with (clinical staff, provider,  specific staff member): CLINICAL STAFF    What was the call regarding: PATIENT CALLED STATING THAT HER TRAZODONE WAS NEVER RECEIVED BY THE PHARMACY. SHE WOULD LIKE THE MEDICATION RESENT BACK TO DEANDRE RENNER

## 2024-09-17 ENCOUNTER — OFFICE VISIT (OUTPATIENT)
Dept: BEHAVIORAL HEALTH | Facility: CLINIC | Age: 64
End: 2024-09-17
Payer: MEDICARE

## 2024-09-17 VITALS
HEIGHT: 66 IN | HEART RATE: 61 BPM | WEIGHT: 141 LBS | DIASTOLIC BLOOD PRESSURE: 75 MMHG | BODY MASS INDEX: 22.66 KG/M2 | SYSTOLIC BLOOD PRESSURE: 124 MMHG

## 2024-09-17 DIAGNOSIS — G24.09 DRUG-INDUCED TARDIVE DYSTONIA: ICD-10-CM

## 2024-09-17 DIAGNOSIS — T50.905A DRUG-INDUCED TARDIVE DYSTONIA: ICD-10-CM

## 2024-09-17 DIAGNOSIS — F51.01 PRIMARY INSOMNIA: ICD-10-CM

## 2024-09-17 DIAGNOSIS — G24.01 TARDIVE DYSKINESIA: ICD-10-CM

## 2024-09-17 DIAGNOSIS — F41.1 GENERALIZED ANXIETY DISORDER: ICD-10-CM

## 2024-09-17 DIAGNOSIS — F33.0 MILD EPISODE OF RECURRENT MAJOR DEPRESSIVE DISORDER: Primary | ICD-10-CM

## 2024-09-17 RX ORDER — MIRTAZAPINE 7.5 MG/1
7.5 TABLET, FILM COATED ORAL NIGHTLY
Qty: 30 TABLET | Refills: 1 | Status: SHIPPED | OUTPATIENT
Start: 2024-09-17

## 2024-10-07 ENCOUNTER — OFFICE VISIT (OUTPATIENT)
Dept: BEHAVIORAL HEALTH | Facility: CLINIC | Age: 64
End: 2024-10-07
Payer: MEDICARE

## 2024-10-07 VITALS
HEIGHT: 66 IN | WEIGHT: 139 LBS | OXYGEN SATURATION: 100 % | HEART RATE: 59 BPM | SYSTOLIC BLOOD PRESSURE: 125 MMHG | BODY MASS INDEX: 22.34 KG/M2 | DIASTOLIC BLOOD PRESSURE: 100 MMHG

## 2024-10-07 DIAGNOSIS — F41.1 GENERALIZED ANXIETY DISORDER: ICD-10-CM

## 2024-10-07 DIAGNOSIS — G24.09 DRUG-INDUCED TARDIVE DYSTONIA: ICD-10-CM

## 2024-10-07 DIAGNOSIS — T50.905A DRUG-INDUCED TARDIVE DYSTONIA: ICD-10-CM

## 2024-10-07 DIAGNOSIS — F33.0 MILD EPISODE OF RECURRENT MAJOR DEPRESSIVE DISORDER: Primary | ICD-10-CM

## 2024-10-07 DIAGNOSIS — F51.01 PRIMARY INSOMNIA: ICD-10-CM

## 2024-10-07 RX ORDER — MIRTAZAPINE 7.5 MG/1
7.5 TABLET, FILM COATED ORAL NIGHTLY
Qty: 30 TABLET | Refills: 1 | Status: SHIPPED | OUTPATIENT
Start: 2024-10-07

## 2024-10-07 NOTE — PROGRESS NOTES
"[facility]  Medication Management Follow-up      Record Review is below for 10/07/2024 :   7/10/2024 Glucose 132 CMP is otherwise reassuring  EKG Results:  None in record  Head Imaging:  None in record  Vital Signs:   /100   Pulse 59   Ht 167.6 cm (66\")   Wt 63 kg (139 lb)   SpO2 100%   BMI 22.44 kg/m²     Chief Complaint: Depression. Anxiety. Insomnia.     History of Present Illness:   Ann Perla is a 64 y.o. female who presents today for follow-up and medication management for:    ICD-10-CM ICD-9-CM   1. Mild episode of recurrent major depressive disorder  F33.0 296.31   2. Generalized anxiety disorder  F41.1 300.02   3. Primary insomnia  F51.01 307.42   4. Drug-induced tardive dystonia  G24.09 333.72    T50.905A        10/07/2024 Patient is taking medications as prescribed and is tolerating them well.   Depression and Anxiety  Patient had COVID since our last encounter and was instructed by urgent care to hold her psychotropic medications while she was taking it. Progression of symptoms, frequency, and intensity is waxing and waning. Patient continues to experience feelings of sadness, low mood, increased appetite, psychomotor agitation, excessive anxiety and worry, anxiety, difficulty controlling the worry, restlessness, feeling keyed up or on edge, irritability, and these symptoms are causing significant distress or impairment. Patient denies feeling worthless, guilty, hopelessness,. Denies thinking about death and dying, suicidal ideation, planning, or intent to self-harm.  Denies AVH.  Clinically significant distress or impairment in social, occupational, or other important areas of functioning is waxing and waning.  Insomnia  Progression of symptoms, frequency, and intensity is waxing and waning. Patient experiences challenges difficulty falling asleep (onset insomnia) with inability to fall asleep beyond 20-30 minutes and inability to maintain sleep " (maintenance/middle insomnia) , which occurs even under ideal circumstances.   Tardive Dyskinesia  Has been off of the quetiapine for approximately 1 month. Patient presents with complete remission of symptoms of tardive dyskinesia, no movements are noted.       09/17/2024 Patient is taking medications as prescribed and is tolerating them well.   Depression and Anxiety  Progression of symptoms, frequency, and intensity is waxing and waning but better overall. Patient continues to experience feelings of sadness, low mood, psychomotor agitation, excessive anxiety and worry, anxiety, difficulty controlling the worry, restlessness, feeling keyed up or on edge, and these symptoms are causing significant distress or impairment. Patient denies feeling worthless, guilty, hopelessness,. Denies thinking about death and dying, suicidal ideation, planning, or intent to self-harm.  Denies AVH.  Clinically significant distress or impairment in social, occupational, or other important areas of functioning is waxing and waning but better overall.  Tardive Dyskinesia  Has been off of the quetiapine for approximately 5 days. Patient presents with moderate improvement of symptoms of tardive dyskinesia which include loss of control of muscles, especially of face, arms, and legs. Mild repetitive involuntary movements noted:  Grimacing, Puckering and pursing of lips, lip smacking  Jerking of arms and legs  Insomnia  Only getting about 3-4 hours sleep. Patient has been taking trazodone 150 mg at bedtime, however it has not been as effective as the quetiapine for insomnia. We discontinued the quetiapine r/t tardive dyskinesia symptoms. Progression of symptoms, frequency, and intensity is gradually worsening. Patient experiences challenges difficulty falling asleep (onset insomnia) with inability to fall asleep beyond 20-30 minutes, inability to maintain sleep (maintenance/middle insomnia) , and early-morning wakefulness (late insomnia)  before sleep reaches 6.5 hours, which occurs even under ideal circumstances.   Start mirtazapine 7.5 mg at bedtime  Discontinue trazodone  Continue viibryd 40 mg daily  Follow-up 1 month      08/26/2024   Depression  Patient endorses gradually worsening feelings of sadness, low mood, and loss of interest in usual activities. These feelings are accompanied by anhedonia, fatigue and low energy most days, feeling worthless, guilty, and hopeless. Describes an inability to focus and concentrate that may interfere with daily tasks at home, work, or school. Movements that are unusually slow or agitated (a change which is often noticeable to others). Denies thinking about death and dying, suicidal ideation, planning, or intent to self-harm. These symptoms cause the patient clinically significant distress or impairment in social, occupational, or other important areas of functioning.  PHQ-9 is 9.  Generalized Anxiety Disorder (NELLA)   Patient experiences excessive anxiety and worry (apprehensive expectation), occurring more days than not for at least 6 months, about a number of events or activities (such as work or school performance). Finds it difficult to control the worry. The anxiety and worry are associated with restlessness or feeling keyed up or on edge, being easily fatigued, difficulty concentrating or mind going blank, irritability, muscle tension, and sleep disturbance (difficulty falling or staying asleep, or restless, unsatisfying sleep). The anxiety, worry, or physical symptoms cause clinically significant distress or impairment in social, occupational, or other important areas of functioning.   NELLA-7 is 12.  Insomnia  Sleep study several years ago, negative CELE. Sleep difficulty has been present at least 3 nights per week and for a period of at least 3 months. Patient experiences challenges difficulty falling asleep (onset insomnia) with inability to fall asleep beyond 20-30 minutes and inability to maintain  sleep (maintenance/middle insomnia) , which occurs even under ideal circumstances. Is unable to sleep even with ample opportunity. Has been taking quetiapine for a few years but has been developing symptoms of TD.   Tardive Dyskinesia  Patient presents with primary symptoms of tardive dyskinesia which include loss of control of muscles, especially of face, arms, and legs. Mild repetitive involuntary movements noted:  Grimacing, Puckering and pursing of lips, lip smacking  Jerking of arms and legs  Decrease quetiapine to 100 mg daily for 7 days, then decrease to 50 mg daily for 7 days, then decrease to 25 mg for 7 days, then stop.  Start trazodone 25 mg at bedtime for 7 days, then increase to 50 mg at bedtime  Continue viibryd 40 mg daily  Follow-up 1 month    Per Referring Provider 6/27/2024 Ann LEÓN Pan is also being seen today for   Headache-started emgality last week, weaned from topamax  Stopped 3 days ago  GERD-improved with omeprazole  Right eye concern-patient reports that her grandson noticed a red spot in her right eye.  She denies any pain with this.  Dizziness at times int he last 2-3 wks  She reports rooming spinning intermittently. Hx of vertigo. Vertigo sx have improved. Feeling off balance. Still on propanolol for migraine prevention. Low bp in the morning. Dizziness worse with position changes.     Depression/Anxiety-long hx of this. She has been on seroquel and viibryd  Denies SI/HI currently  She previously saw escobar    Past Psychiatric History:  Began Treatment: Adulthood  Diagnoses: Depression, anxiety  Psychiatrist: Yes  Therapist: Denies  Admission History: Denies  Medication Trials: quetiapine, viibryd, zoloft, prozac, wellbutrin, topomax  Family history suicide attempts: Denies  Family history suicide completions: Denies  Suicide Attempts: Denies  Self Harm: Denies  Substance use/abuse:Denies  Withdrawal Symptoms: Not applicable  Longest Period Sober: Not applicable  AA: Not  applicable  Trauma/Childhood/ACE: Worked really hard all through childhood, farm work. Denies abuse, neglect, or major adverse events  Access to Firearms: Yes, safe, secured    Safety/Risk Assessment: Risk of self-harm acutely and chronically is mild to moderate.    Static/Dynamic risk factors include diagnosis of mental disorder, psychosocial stressors,Recent stressor or loss and Social factors.      MENTAL STATUS EXAM   General Appearance:  Cleanly groomed and dressed and well developed  Eye Contact:  Good eye contact  Attitude:  Cooperative and polite  Motor Activity:  Normal gait, posture  Speech:  Normal rate, tone, volume  Mood and affect:  Normal, pleasant and euthymic  Hopelessness:  Denies  Thought Process:  Logical and goal-directed  Associations/ Thought Content:  No delusions  Hallucinations:  None  Suicidal Ideations:  Not present  Homicidal Ideation:  Not present  Sensorium:  Alert  Orientation:  Person, place, time and situation  Immediate Recall, Recent, and Remote Memory:  Intact  Attention Span/ Concentration:  Good  Fund of Knowledge:  Appropriate for age and educational level  Intellectual Functioning:  Average range  Insight:  Good  Judgement:  Good  Reliability:  Good  Impulse Control:  Good    Review of systems is negative except as noted in HPI.  Labs:  WBC   Date Value Ref Range Status   05/31/2024 5.38 3.40 - 10.80 10*3/mm3 Final     Platelets   Date Value Ref Range Status   05/31/2024 266 140 - 450 10*3/mm3 Final     Hemoglobin   Date Value Ref Range Status   05/31/2024 13.2 12.0 - 15.9 g/dL Final     Hematocrit   Date Value Ref Range Status   05/31/2024 40.1 34.0 - 46.6 % Final     Glucose   Date Value Ref Range Status   10/11/2024 96 65 - 99 mg/dL Final     Creatinine   Date Value Ref Range Status   10/11/2024 0.96 0.57 - 1.00 mg/dL Final     ALT (SGPT)   Date Value Ref Range Status   10/11/2024 17 1 - 33 U/L Final     AST (SGOT)   Date Value Ref Range Status   10/11/2024 23 1 - 32 U/L  Final     BUN   Date Value Ref Range Status   10/11/2024 18 8 - 23 mg/dL Final     eGFR   Date Value Ref Range Status   10/11/2024 66.6 >60.0 mL/min/1.73 Final     Total Cholesterol   Date Value Ref Range Status   10/11/2024 147 0 - 200 mg/dL Final     Triglycerides   Date Value Ref Range Status   10/11/2024 162 (H) 0 - 150 mg/dL Final     HDL Cholesterol   Date Value Ref Range Status   10/11/2024 38 (L) 40 - 60 mg/dL Final     LDL Cholesterol    Date Value Ref Range Status   10/11/2024 81 0 - 100 mg/dL Final     VLDL Cholesterol   Date Value Ref Range Status   10/11/2024 28 5 - 40 mg/dL Final     LDL/HDL Ratio   Date Value Ref Range Status   10/11/2024 2.02  Final     Hemoglobin A1C   Date Value Ref Range Status   05/31/2024 5.60 4.80 - 5.60 % Final     TSH   Date Value Ref Range Status   10/11/2024 1.120 0.270 - 4.200 uIU/mL Final     Free T4   Date Value Ref Range Status   05/18/2020 1.2 0.9 - 1.8 ng/dL Final      Pain Management Panel           No data to display               Imaging Results:  XR Wrist Comp Min 3 Vw RT    Result Date: 8/26/2024  of the right wrist and right thumb shows grade 4 CMC arthritis Osteophytes over the ulnar styloid with ulnar styloid abutment    MRI Hand Right Without Contrast    Result Date: 7/2/2024  Impression: 1.1.6 cm x 0.9 cm focus of heterotopic ossification distal to the distal ulna extending to and mechanically eroding the hamate and triquetrum. Finding is suspected to be secondary to gout. 2.Subtle erosive changes involving the second and third metacarpal heads. 3.Focus of intermediate signal abnormality in the soft tissues radial to the distal scaphoid. 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 PVNS could also produce this appearance but is felt less likely given other findings. 4.Tears of the TFC and scapholunate ligaments. 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 Electronically Signed: Anurag Alaniz MD   7/2/2024 2:10 PM EDT  Workstation ID: [id number]    DEXA Bone Density Axial    Result Date: 6/14/2024  Impression: Osteopenia -Based upon the FRAX score, patient does not meet criteria for initiation of pharmacological treatment recommendations for prevention of osteoporosis per the National Osteoporosis Foundation (NOF) guidelines. However, individualized treatment decisions should be made accounting for additional clinical factors. Report dictated by: Charlee Edwards  I have personally reviewed this case and agree with the findings above: Electronically Signed: Anurag Alaniz MD  6/14/2024 3:44 PM EDT  Workstation ID: [id number]    Current Medications:   Current Outpatient Medications   Medication Sig Dispense Refill    Calcium Carbonate-Vitamin D 600-10 MG-MCG per tablet Take 1 tablet by mouth Daily. 90 tablet 1    estradiol (ESTRACE VAGINAL) 0.1 MG/GM vaginal cream Place 0.5 gm PV and massage 0.5 gm to vulva and vestibule at night 2x/week 42.5 g 3    Ibuprofen 3 %, Gabapentin 10 %, Baclofen 2 %, lidocaine 4 %, Ketamine HCl 4 % Apply 1-2 g topically to the appropriate area as directed 3 (Three) to 4 (Four) times daily. 90 g 0    Lifitegrast (Xiidra) 5 % ophthalmic solution Administer 1 drop to both eyes 2 (Two) Times a Day.      Linzess 72 MCG capsule capsule Take 1 capsule by mouth Every Morning Before Breakfast. 90 capsule 1    mirtazapine (REMERON) 7.5 MG tablet Take 1 tablet by mouth Every Night. 30 tablet 1    polyethylene glycol (MIRALAX) 17 GM/SCOOP powder       PreviDent 5000 Plus 1.1 % cream As Needed.      propranolol LA (Inderal LA) 60 MG 24 hr capsule Take 1 capsule by mouth Daily. 90 capsule 1    simethicone (Mi-Acid Gas Relief) 80 MG chewable tablet 1 tab(s) chewed 4 times a day PRN for 30 day(s)      simvastatin (ZOCOR) 20 MG tablet Take 1 tablet by mouth Every Night. 90 tablet 1    valACYclovir (Valtrex) 500 MG tablet Take 1 tablet by mouth Every Other Day. 45 tablet 1    Vibegron 75 MG tablet Take 1  tablet by mouth Daily for 180 days. 90 tablet 1    vilazodone (VIIBRYD) 40 MG tablet tablet Take 1 tablet by mouth Daily. 90 tablet 1    galcanezumab-gnlm (EMGALITY) 120 MG/ML auto-injector pen Inject 1 mL under the skin into the appropriate area as directed Every 30 (Thirty) Days. 1 mL 5    omeprazole (priLOSEC) 40 MG capsule Take 1 capsule by mouth Daily. 90 capsule 1    Synthroid 50 MCG tablet Take 1 tablet by mouth Every Morning. 90 tablet 1     No current facility-administered medications for this visit.     Problem List:  Patient Active Problem List   Diagnosis    Cervical disc herniation    Chronic kidney disease, stage 2 (mild)    Degenerative arthritis of thumb    Depression    Hx of blood clots    Lower urinary tract infectious disease    Multiple allergies    Trigger thumb of left hand    Trigger thumb of right hand    Osteoporosis    Chronic idiopathic constipation    Heartburn    Esophageal dysphagia    Cervicalgia    Bilateral occipital neuralgia    Menopause    Osteopenia of multiple sites    Genitourinary syndrome of menopause    Decreased libido    Hypoactive sexual desire disorder    Cubital tunnel syndrome on right    Complex tear of triangular fibrocartilage of right wrist    Heterotopic ossification    Wrist arthritis    Hand arthritis    Murmur, cardiac    Mixed hyperlipidemia    Hypothyroidism     Allergy:   No Known Allergies   Discontinued Medications:  Medications Discontinued During This Encounter   Medication Reason    mirtazapine (REMERON) 7.5 MG tablet Reorder       PLAN:   Presentation seems most consistent with DSM-V criteria for:  Diagnoses and all orders for this visit:    1. Mild episode of recurrent major depressive disorder (Primary)    2. Generalized anxiety disorder    3. Primary insomnia  -     mirtazapine (REMERON) 7.5 MG tablet; Take 1 tablet by mouth Every Night.  Dispense: 30 tablet; Refill: 1    4. Drug-induced tardive dystonia       Continue mirtazapine 7.5 mg at  bedtime  Continue viibryd 40 mg daily  Follow-up 1 month  Medication Education:   VIIBRYD (VILAZODONE) Start Viibryd 10 mg by mouth daily in the morning with food for 7 days, then 20 mg by mouth daily in the morning with food to target depressed mood and anxiety. Risks, benefits, alternatives discussed with patient including diarrhea, nausea, vomiting, dry mouth and insomnia. After discussion of these risks and benefits, the patient voiced understanding and agreed to proceed.    MIRTAZAPINE (REMERON) Risks, benefits, alternatives discussed with patient including GI upset, sedation, dizziness with falls risk, increased appetite.  After discussion of these risks and benefits, the patient voiced understanding and agreed to proceed.  Medications:   New Medications Ordered This Visit   Medications    mirtazapine (REMERON) 7.5 MG tablet     Sig: Take 1 tablet by mouth Every Night.     Dispense:  30 tablet     Refill:  1      RONY reviewed.   Discussed medication options and treatment plan of prescribed medication as well as the risks, benefits, and side effects.  Patient is agreeable to call the office with any worsening of symptoms or onset of side effects.   Patient is agreeable to call 911 or go to the nearest ER should he/she begin having SI/HI.   Patient acknowledged, is agreeable to continue with current treatment plan, and was educated on the importance of compliance with treatment and follow-up appointments.  Addressed all questions and concerns.     Psychotherapy:    Provided minimal supportive therapy.  Functional status: Moderate symptoms OR moderate difficulty in social occupational or social functioning (51-60)  Prognosis: Fair with expectation for some response to treatment  Progress: waxing and waning      Follow-up: Return in about 2 months (around 12/7/2024).     This document has been electronically signed by LAURENCE Carter  October 20, 2024 17:12 EDT  Please note that portions of this note were  completed with a voice recognition program.  Copied text in this note has been reviewed and is accurate as of 10/20/24

## 2024-10-11 ENCOUNTER — CLINICAL SUPPORT (OUTPATIENT)
Dept: INTERNAL MEDICINE | Facility: CLINIC | Age: 64
End: 2024-10-11
Payer: MEDICARE

## 2024-10-11 ENCOUNTER — OFFICE VISIT (OUTPATIENT)
Dept: CARDIOLOGY | Facility: CLINIC | Age: 64
End: 2024-10-11
Payer: MEDICARE

## 2024-10-11 VITALS
HEIGHT: 66 IN | WEIGHT: 142 LBS | DIASTOLIC BLOOD PRESSURE: 80 MMHG | BODY MASS INDEX: 22.82 KG/M2 | HEART RATE: 66 BPM | SYSTOLIC BLOOD PRESSURE: 132 MMHG

## 2024-10-11 DIAGNOSIS — E03.9 HYPOTHYROIDISM, UNSPECIFIED TYPE: ICD-10-CM

## 2024-10-11 DIAGNOSIS — R01.1 MURMUR, CARDIAC: ICD-10-CM

## 2024-10-11 DIAGNOSIS — I51.7 LEFT VENTRICULAR HYPERTROPHY: ICD-10-CM

## 2024-10-11 DIAGNOSIS — E78.2 MIXED HYPERLIPIDEMIA: ICD-10-CM

## 2024-10-11 DIAGNOSIS — R01.1 MURMUR, CARDIAC: Primary | ICD-10-CM

## 2024-10-11 LAB
ALBUMIN SERPL-MCNC: 4.3 G/DL (ref 3.5–5.2)
ALBUMIN/GLOB SERPL: 1.6 G/DL
ALP SERPL-CCNC: 75 U/L (ref 39–117)
ALT SERPL W P-5'-P-CCNC: 17 U/L (ref 1–33)
ANION GAP SERPL CALCULATED.3IONS-SCNC: 12 MMOL/L (ref 5–15)
AST SERPL-CCNC: 23 U/L (ref 1–32)
BILIRUB SERPL-MCNC: 0.4 MG/DL (ref 0–1.2)
BUN SERPL-MCNC: 18 MG/DL (ref 8–23)
BUN/CREAT SERPL: 18.8 (ref 7–25)
CALCIUM SPEC-SCNC: 10.5 MG/DL (ref 8.6–10.5)
CHLORIDE SERPL-SCNC: 103 MMOL/L (ref 98–107)
CHOLEST SERPL-MCNC: 147 MG/DL (ref 0–200)
CO2 SERPL-SCNC: 25 MMOL/L (ref 22–29)
CREAT SERPL-MCNC: 0.96 MG/DL (ref 0.57–1)
EGFRCR SERPLBLD CKD-EPI 2021: 66.6 ML/MIN/1.73
GLOBULIN UR ELPH-MCNC: 2.7 GM/DL
GLUCOSE SERPL-MCNC: 96 MG/DL (ref 65–99)
HDLC SERPL-MCNC: 38 MG/DL (ref 40–60)
LDLC SERPL CALC-MCNC: 81 MG/DL (ref 0–100)
LDLC/HDLC SERPL: 2.02 {RATIO}
POTASSIUM SERPL-SCNC: 4.2 MMOL/L (ref 3.5–5.2)
PROT SERPL-MCNC: 7 G/DL (ref 6–8.5)
SODIUM SERPL-SCNC: 140 MMOL/L (ref 136–145)
TRIGL SERPL-MCNC: 162 MG/DL (ref 0–150)
TSH SERPL DL<=0.05 MIU/L-ACNC: 1.12 UIU/ML (ref 0.27–4.2)
VLDLC SERPL-MCNC: 28 MG/DL (ref 5–40)

## 2024-10-11 PROCEDURE — 99203 OFFICE O/P NEW LOW 30 MIN: CPT | Performed by: INTERNAL MEDICINE

## 2024-10-11 PROCEDURE — 36415 COLL VENOUS BLD VENIPUNCTURE: CPT | Performed by: NURSE PRACTITIONER

## 2024-10-11 PROCEDURE — 84443 ASSAY THYROID STIM HORMONE: CPT | Performed by: NURSE PRACTITIONER

## 2024-10-11 PROCEDURE — 1159F MED LIST DOCD IN RCRD: CPT | Performed by: INTERNAL MEDICINE

## 2024-10-11 PROCEDURE — 93000 ELECTROCARDIOGRAM COMPLETE: CPT | Performed by: INTERNAL MEDICINE

## 2024-10-11 PROCEDURE — 80053 COMPREHEN METABOLIC PANEL: CPT | Performed by: NURSE PRACTITIONER

## 2024-10-11 PROCEDURE — 1160F RVW MEDS BY RX/DR IN RCRD: CPT | Performed by: INTERNAL MEDICINE

## 2024-10-11 PROCEDURE — 80061 LIPID PANEL: CPT | Performed by: NURSE PRACTITIONER

## 2024-10-11 NOTE — PROGRESS NOTES
Chief Complaint  Heart Murmur, Ventricular hypertrophy, and Chest Pain    Subjective            Ann Perla presents to [facility] CARDIOLOGY  Heart Murmur    Chest Pain     Her past medical history is significant for muscle weakness.       63-year-old female.  She has no previous cardiac problems.  She had a cardiac murmur on exam and was referred to cardiology.  She has had hiatal hernia surgery before.  She does have chest discomfort specifically after eating certain meals but it is not exertional and atypical for cardiac type discomfort.  She denies excessive shortness of breath, palpitations, edema.    PMH  Past Medical History:   Diagnosis Date    Abdominal pain, LLQ 12/04/2015    Back pain     Bunion     Deep vein thrombosis     Depressive disorder     Disease of thyroid gland     Foot pain, right 04/08/2021    GERD (gastroesophageal reflux disease)     Hallux valgus of right foot 12/03/2018    Hammer toe     Herpes genitalia     History of transfusion     HLD (hyperlipidemia)     Migraine     Mixed incontinence urge and stress     OAB (overactive bladder) 01/24/2018    Seizures     Urinary urgency          SURGICALHX  Past Surgical History:   Procedure Laterality Date    CERVICAL FUSION      COLONOSCOPY  2018, 2016    COLONOSCOPY N/A 08/28/2023    Procedure: COLONOSCOPY WITH POLYPECTOMIES/HOT SNARE WITH ELEVIEW INJECTION AND CLIP APPLICATION X 2;  Surgeon: Jeanette Mcmahon MD;  Location: [facility] ENDOSCOPY;  Service: Gastroenterology;  Laterality: N/A;  COLON POLYPS, DIVERTICULOSIS, HEMORRHOIDS    CYSTOSCOPY      CYSTOSCOPY RETROGRADE PYELOGRAM      ENDOSCOPY  2018    ENDOSCOPY N/A 08/28/2023    Procedure: ESOPHAGOGASTRODUODENOSCOPY WITH BIOPSIES AND ESOPHAGEAL DILATION TO 18 MM;  Surgeon: Jeanette Mcmahon MD;  Location: [facility] ENDOSCOPY;  Service: Gastroenterology;  Laterality: N/A;  ESOPHAGITIS    FOOT SURGERY      HAND SURGERY Left     HAND SURGERY Right     HIATAL  HERNIA REPAIR  05/10/2019    NECK SURGERY      TUBAL ABDOMINAL LIGATION      UPPER GASTROINTESTINAL ENDOSCOPY      VEIN SURGERY          SOC  Social History     Socioeconomic History    Marital status:     Number of children: 2   Tobacco Use    Smoking status: Never     Passive exposure: Never    Smokeless tobacco: Never   Vaping Use    Vaping status: Never Used   Substance and Sexual Activity    Alcohol use: Not Currently     Comment: former- 7-8-19    Drug use: Never    Sexual activity: Not Currently         FAMHX  Family History   Problem Relation Age of Onset    Stroke Mother     Hypertension Mother     Heart disease Brother     Kidney nephrosis Brother     Hypertension Maternal Grandmother     Diabetes Maternal Grandmother     Cancer Maternal Grandfather         Unspecified    Cancer Maternal Aunt     Breast cancer Maternal Aunt     Cancer Other         Unspecified    Cancer Other         Unspecified    Lung cancer Other     Colon cancer Neg Hx           ALLERGY  No Known Allergies     MEDSCURRENT    Current Outpatient Medications:     Calcium Carbonate-Vitamin D 600-10 MG-MCG per tablet, Take 1 tablet by mouth Daily., Disp: 90 tablet, Rfl: 1    estradiol (ESTRACE VAGINAL) 0.1 MG/GM vaginal cream, Place 0.5 gm PV and massage 0.5 gm to vulva and vestibule at night 2x/week, Disp: 42.5 g, Rfl: 3    galcanezumab-gnlm (EMGALITY) 120 MG/ML auto-injector pen, Inject 1 mL under the skin into the appropriate area as directed Every 30 (Thirty) Days., Disp: 1 mL, Rfl: 5    Ibuprofen 3 %, Gabapentin 10 %, Baclofen 2 %, lidocaine 4 %, Ketamine HCl 4 %, Apply 1-2 g topically to the appropriate area as directed 3 (Three) to 4 (Four) times daily., Disp: 90 g, Rfl: 0    Lifitegrast (Xiidra) 5 % ophthalmic solution, Administer 1 drop to both eyes 2 (Two) Times a Day., Disp: , Rfl:     Linzess 72 MCG capsule capsule, Take 1 capsule by mouth Every Morning Before Breakfast., Disp: 90 capsule, Rfl: 1    mirtazapine (REMERON)  "7.5 MG tablet, Take 1 tablet by mouth Every Night., Disp: 30 tablet, Rfl: 1    omeprazole (priLOSEC) 40 MG capsule, Take 1 capsule by mouth Daily., Disp: 90 capsule, Rfl: 1    polyethylene glycol (MIRALAX) 17 GM/SCOOP powder, , Disp: , Rfl:     PreviDent 5000 Plus 1.1 % cream, As Needed., Disp: , Rfl:     promethazine-dextromethorphan (PROMETHAZINE-DM) 6.25-15 MG/5ML syrup, Take 5 mL by mouth 4 (Four) Times a Day As Needed for Cough., Disp: 120 mL, Rfl: 0    propranolol LA (Inderal LA) 60 MG 24 hr capsule, Take 1 capsule by mouth Daily., Disp: 90 capsule, Rfl: 1    simethicone (Mi-Acid Gas Relief) 80 MG chewable tablet, 1 tab(s) chewed 4 times a day PRN for 30 day(s), Disp: , Rfl:     simvastatin (ZOCOR) 20 MG tablet, Take 1 tablet by mouth Every Night., Disp: 90 tablet, Rfl: 1    Synthroid 50 MCG tablet, Take 1 tablet by mouth Every Morning., Disp: 90 tablet, Rfl: 1    valACYclovir (Valtrex) 500 MG tablet, Take 1 tablet by mouth Every Other Day., Disp: 45 tablet, Rfl: 1    Vibegron 75 MG tablet, Take 1 tablet by mouth Daily for 180 days., Disp: 90 tablet, Rfl: 1    vilazodone (VIIBRYD) 40 MG tablet tablet, Take 1 tablet by mouth Daily., Disp: 90 tablet, Rfl: 1    testosterone (Testim) 50 MG/5GM (1%) gel gel, Place entirety of one tube of testim in a 5 ml syringe.  Apply 0.5 ml to back of the knee daily (Patient not taking: Reported on 10/7/2024), Disp: 15 g, Rfl: 5      Review of Systems   Constitutional: Negative.   HENT: Negative.     Eyes: Negative.    Respiratory: Negative.     Endocrine: Negative.    Hematologic/Lymphatic: Negative.    Skin: Negative.    Musculoskeletal:  Positive for muscle weakness.   Gastrointestinal:         Postprandial lower chest discomfort   Genitourinary: Negative.    Neurological: Negative.    Psychiatric/Behavioral: Negative.          Objective     /80   Pulse 66   Ht 167.6 cm (66\")   Wt 64.4 kg (142 lb)   BMI 22.92 kg/m²       General Appearance:   well developed  well " nourished  HENT:   oropharynx moist  lips not cyanotic  Neck:  thyroid not enlarged  supple  Respiratory:  no respiratory distress  normal breath sounds  no rales  Cardiovascular:  no jugular venous distention  regular rhythm  apical impulse normal  S1 normal, S2 normal  no S3, no S4   Grade 2 parasternal systolic murmur heard loudest at the base  no rub, no thrill  carotid pulses normal; no bruit  pedal pulses normal  lower extremity edema: none    Musculoskeletal:  no clubbing of fingers.   normocephalic, head atraumatic  Skin:   warm, dry  Psychiatric:  judgement and insight appropriate  normal mood and affect      Result Review :     The following data was reviewed by: Chris Cuenca MD on 10/11/2024:    CMP          5/31/2024    15:37 7/10/2024    07:05   CMP   Glucose 97  132    BUN 12  13    Creatinine 1.01  0.87    EGFR 62.7  75.0    Sodium 142  139    Potassium 4.3  4.2    Chloride 105  106    Calcium 10.0  9.4    Total Protein 7.5  6.5    Albumin 4.7  4.0    Globulin 2.8  2.5    Total Bilirubin 0.4  0.3    Alkaline Phosphatase 109  100    AST (SGOT) 20  19    ALT (SGPT) 16  15    Albumin/Globulin Ratio 1.7  1.6    BUN/Creatinine Ratio 11.9  14.9    Anion Gap 12.2  10.3      CBC          5/31/2024    15:37   CBC   WBC 5.38    RBC 4.28    Hemoglobin 13.2    Hematocrit 40.1    MCV 93.7    MCH 30.8    MCHC 32.9    RDW 12.6    Platelets 266      Lipid Panel          5/31/2024    15:37   Lipid Panel   Total Cholesterol 133    Triglycerides 133    HDL Cholesterol 45    VLDL Cholesterol 23    LDL Cholesterol  65    LDL/HDL Ratio 1.36      TSH          5/31/2024    15:37   TSH   TSH 0.717        Data reviewed : Primary care records reviewed        ECG 12 Lead    Date/Time: 10/11/2024 10:26 AM  Performed by: BRETT Cuenca MD    Authorized by: BRETT Cuenca MD  Comparison: not compared with previous ECG   Previous ECG: no previous ECG available  Rhythm: sinus rhythm  Conduction: conduction normal  ST  Segments: ST segments normal  T Waves: T waves normal  QRS axis: normal  Other: no other findings    Clinical impression: normal ECG                      Assessment and Plan        ASSESSMENT:  Encounter Diagnoses   Name Primary?    Murmur, cardiac Yes    Left ventricular hypertrophy          PLAN:    1.  Cardiac murmur-likely due to tricuspid valve regurgitation which was mild on recent echo.  Nothing else to do about that at this point  2.  Left ventricular hypertrophy-mild based on the measurements on the echo.  The patient's blood pressure is controlled.  She is not having symptoms.  Continue routine primary care follow-up for blood pressure management  3.  The patient will be followed as needed          Patient was given instructions and counseling regarding her condition or for health maintenance advice. Please see specific information pulled into the AVS if appropriate.             BRETT Cuenca MD  10/11/2024    10:22 EDT

## 2024-10-11 NOTE — PROGRESS NOTES
Venipuncture Blood Specimen Collection  Venipuncture performed in [facility] by Alejandro Tillman MA with good hemostasis. Patient tolerated the procedure well without complications.   10/11/24   Alejandro Tillman MA

## 2024-10-17 ENCOUNTER — OFFICE VISIT (OUTPATIENT)
Dept: INTERNAL MEDICINE | Facility: CLINIC | Age: 64
End: 2024-10-17
Payer: MEDICARE

## 2024-10-17 VITALS
BODY MASS INDEX: 23.02 KG/M2 | DIASTOLIC BLOOD PRESSURE: 64 MMHG | HEART RATE: 58 BPM | OXYGEN SATURATION: 98 % | SYSTOLIC BLOOD PRESSURE: 102 MMHG | TEMPERATURE: 97.3 F | WEIGHT: 142.6 LBS

## 2024-10-17 DIAGNOSIS — K44.9 PARAESOPHAGEAL HERNIA: ICD-10-CM

## 2024-10-17 DIAGNOSIS — E78.2 MIXED HYPERLIPIDEMIA: Primary | ICD-10-CM

## 2024-10-17 DIAGNOSIS — R51.9 NONINTRACTABLE HEADACHE, UNSPECIFIED CHRONICITY PATTERN, UNSPECIFIED HEADACHE TYPE: ICD-10-CM

## 2024-10-17 DIAGNOSIS — R01.1 MURMUR, CARDIAC: ICD-10-CM

## 2024-10-17 DIAGNOSIS — K20.90 ESOPHAGITIS: ICD-10-CM

## 2024-10-17 DIAGNOSIS — E03.9 HYPOTHYROIDISM, UNSPECIFIED TYPE: ICD-10-CM

## 2024-10-17 PROCEDURE — 90656 IIV3 VACC NO PRSV 0.5 ML IM: CPT | Performed by: NURSE PRACTITIONER

## 2024-10-17 PROCEDURE — 1126F AMNT PAIN NOTED NONE PRSNT: CPT | Performed by: NURSE PRACTITIONER

## 2024-10-17 PROCEDURE — 99214 OFFICE O/P EST MOD 30 MIN: CPT | Performed by: NURSE PRACTITIONER

## 2024-10-17 PROCEDURE — G0008 ADMIN INFLUENZA VIRUS VAC: HCPCS | Performed by: NURSE PRACTITIONER

## 2024-10-17 PROCEDURE — 3044F HG A1C LEVEL LT 7.0%: CPT | Performed by: NURSE PRACTITIONER

## 2024-10-17 PROCEDURE — 91320 SARSCV2 VAC 30MCG TRS-SUC IM: CPT | Performed by: NURSE PRACTITIONER

## 2024-10-17 PROCEDURE — 90480 ADMN SARSCOV2 VAC 1/ONLY CMP: CPT | Performed by: NURSE PRACTITIONER

## 2024-10-17 RX ORDER — LEVOTHYROXINE SODIUM 50 MCG
50 TABLET ORAL EVERY MORNING
Qty: 90 TABLET | Refills: 1 | Status: SHIPPED | OUTPATIENT
Start: 2024-10-17

## 2024-10-17 RX ORDER — OMEPRAZOLE 40 MG/1
40 CAPSULE, DELAYED RELEASE ORAL DAILY
Qty: 90 CAPSULE | Refills: 1 | Status: SHIPPED | OUTPATIENT
Start: 2024-10-17

## 2024-10-17 NOTE — PROGRESS NOTES
Chief Complaint  Follow-up and Med Refill  HLD  Reflux    Subjective      History of Present Illness  The patient is a 64-year-old female who presents today for follow-up on hyperlipidemia, hypothyroidism, and a recent cardiac murmur detected during her last visit.    She has consulted with a cardiologist and undergone an echocardiogram. The murmur is likely associated with mild tricuspid regurgitation. She continues to manage her blood pressure effectively and will consult with her cardiologist as necessary.    She reports experiencing chest pain, which she attributes to her hiatal hernia. She manages this condition with omeprazole 40 mg as needed. She underwent surgery for the hernia approximately 4 to 5 years ago.     HLD-reports no leg cramps typically.  Occasionally she will not drink enough water and experienced some.  Does not feel like it is related to simvastatin.    She has been receiving injections for migraines, administered by her daughter on the first two occasions. She experiences headaches that wake her up in the middle of the night.     Synthroid 50 mcg in the morning but she believes she has been out of this for the last month.  Unsure if she got it filled from the pharmacy last time.     Supplemental Information:  She is having hand surgery done in 02/2024. She has already had 3 hand surgeries.         Objective   Vital Signs:   Vitals:    10/17/24 0829   BP: 102/64   Pulse: 58   Temp: 97.3 °F (36.3 °C)   SpO2: 98%   Weight: 64.7 kg (142 lb 9.6 oz)     Body mass index is 23.02 kg/m².    Wt Readings from Last 3 Encounters:   10/17/24 64.7 kg (142 lb 9.6 oz)   10/11/24 64.4 kg (142 lb)   10/07/24 63 kg (139 lb)     BP Readings from Last 3 Encounters:   10/17/24 102/64   10/11/24 132/80   10/07/24 125/100       Health Maintenance   Topic Date Due    TDAP/TD VACCINES (1 - Tdap) Never done    ZOSTER VACCINE (2 of 3) 02/22/2017    INFLUENZA VACCINE  08/01/2024    COVID-19 Vaccine (4 - 2023-24 season)  09/01/2024    ANNUAL WELLNESS VISIT  06/27/2025    LIPID PANEL  10/11/2025    MAMMOGRAM  05/17/2026    DXA SCAN  06/14/2026    COLORECTAL CANCER SCREENING  08/28/2026    PAP SMEAR  01/11/2027    HEPATITIS C SCREENING  Completed    Pneumococcal Vaccine 0-64  Aged Out       Physical Exam  Vitals and nursing note reviewed.   Constitutional:       General: She is not in acute distress.     Appearance: Normal appearance.   HENT:      Head: Normocephalic and atraumatic.      Right Ear: External ear normal.      Left Ear: External ear normal.      Nose: Nose normal.      Mouth/Throat:      Mouth: Mucous membranes are moist.   Eyes:      Conjunctiva/sclera: Conjunctivae normal.   Cardiovascular:      Rate and Rhythm: Normal rate and regular rhythm.      Pulses: Normal pulses.      Heart sounds: Murmur heard.      No friction rub. No gallop.   Pulmonary:      Effort: Pulmonary effort is normal. No respiratory distress.      Breath sounds: No wheezing, rhonchi or rales.   Musculoskeletal:      Cervical back: Neck supple.      Right lower leg: No edema.      Left lower leg: No edema.   Skin:     General: Skin is warm and dry.   Neurological:      General: No focal deficit present.      Mental Status: She is alert and oriented to person, place, and time.   Psychiatric:         Mood and Affect: Mood normal.         Behavior: Behavior normal.        Physical Exam        Result Review :  The following data was reviewed by: LAURENCE Whyte on 10/17/2024:         Results      BMI is within normal parameters. No other follow-up for BMI required.       Procedures            Assessment & Plan  Mixed hyperlipidemia     Murmur, cardiac    Hypothyroidism, unspecified type    Esophagitis    Paraesophageal hernia    Nonintractable headache, unspecified chronicity pattern, unspecified headache type      Orders Placed This Encounter   Procedures    Fluzone >6mos    COVID-19 (Pfizer) 12yrs+ (COMIRNATY)     New Medications Ordered This  Visit   Medications    omeprazole (priLOSEC) 40 MG capsule     Sig: Take 1 capsule by mouth Daily.     Dispense:  90 capsule     Refill:  1    galcanezumab-gnlm (EMGALITY) 120 MG/ML auto-injector pen     Sig: Inject 1 mL under the skin into the appropriate area as directed Every 30 (Thirty) Days.     Dispense:  1 mL     Refill:  5    Synthroid 50 MCG tablet     Sig: Take 1 tablet by mouth Every Morning.     Dispense:  90 tablet     Refill:  1          Assessment & Plan  1. Hyperlipidemia.  Her cholesterol levels remain stable with no significant changes since the last assessment. She is advised to maintain her current regimen of healthy eating habits. She should continue taking simvastatin and ensure adequate water intake to prevent leg cramps. If leg cramps persist despite adequate hydration, switching to a different statin may be considered.    2. Hypothyroidism.  A prescription for Synthroid 50 mcg has been issued. She is advised to take it first thing in the morning before eating or drinking anything.    3. Hiatal Hernia.  She reports chest pain likely related to her hiatal hernia, especially after eating. She is advised to restart omeprazole 40 mg daily for the next 2 months to allow any damage or irritation of the lining to heal. She should continue eating small, frequent meals and avoid overeating.    4. Migraine.  A prescription for Emgality has been sent to the pharmacy. She is advised to continue with her current regimen and ensure proper administration of the medication.    5. Health Maintenance.  She is scheduled to receive her influenza vaccine and COVID-19 booster today. A shingles vaccine (Shingrix) has been recommended, and she is advised to wait at least 2 weeks before receiving it at a retail pharmacy. The shingles vaccine is a 2-part series, with the second dose to be administered 2 to 6 months after the first.    Follow-up  Return in 3 months for follow-up.    Patient or patient representative  verbalized consent for the use of Ambient Listening during the visit with  LAURENCE Whyte for chart documentation. 10/17/2024  13:04 EDT      FOLLOW UP  Return in about 3 months (around 1/17/2025).  Patient was given instructions and counseling regarding her condition or for health maintenance advice. Please see specific information pulled into the AVS if appropriate.     LAURENCE Whyte  10/17/24  13:04 EDT    CURRENT & DISCONTINUED MEDICATIONS  Current Outpatient Medications   Medication Instructions    Calcium Carbonate-Vitamin D 600-10 MG-MCG per tablet 1 tablet, Oral, Daily    estradiol (ESTRACE VAGINAL) 0.1 MG/GM vaginal cream Place 0.5 gm PV and massage 0.5 gm to vulva and vestibule at night 2x/week    galcanezumab-gnlm (EMGALITY) 120 mg, Subcutaneous, Every 30 Days    Ibuprofen 3 %, Gabapentin 10 %, Baclofen 2 %, lidocaine 4 %, Ketamine HCl 4 % 1-2 g, Topical, 3 to 4 Times Daily    Lifitegrast (Xiidra) 5 % ophthalmic solution 1 drop, 2 Times Daily    Linzess 72 mcg, Oral, Every Morning Before Breakfast    mirtazapine (REMERON) 7.5 mg, Oral, Nightly    omeprazole (PRILOSEC) 40 mg, Oral, Daily    polyethylene glycol (MIRALAX) 17 GM/SCOOP powder No dose, route, or frequency recorded.    PreviDent 5000 Plus 1.1 % cream As Needed    propranolol LA (INDERAL LA) 60 mg, Oral, Daily    simethicone (Mi-Acid Gas Relief) 80 MG chewable tablet 1 tab(s) chewed 4 times a day PRN for 30 day(s)    simvastatin (ZOCOR) 20 mg, Oral, Nightly    Synthroid 50 mcg, Oral, Every Morning    valACYclovir (VALTREX) 500 mg, Oral, Every Other Day    Vibegron 75 mg, Oral, Daily    vilazodone (VIIBRYD) 40 mg, Oral, Daily       Medications Discontinued During This Encounter   Medication Reason    promethazine-dextromethorphan (PROMETHAZINE-DM) 6.25-15 MG/5ML syrup     testosterone (Testim) 50 MG/5GM (1%) gel gel     galcanezumab-gnlm (EMGALITY) 120 MG/ML auto-injector pen Reorder    omeprazole (priLOSEC) 40 MG capsule Reorder     Synthroid 50 MCG tablet Reorder

## 2024-10-20 NOTE — PATIENT INSTRUCTIONS
1.  Please return to clinic at your next scheduled visit.  Please contact the clinic ([phone]) at least 24 hours prior in the event you need to cancel.  2.  Do no harm to yourself or others.    3.  Avoid alcohol and drugs.    4.  Take all medications as prescribed.  Please contact the clinic with any concerns. If you are in need of medication refills, please call the clinic at [phone].    5. Should you want to get in touch with your provider, LAURENCE Carter, please contact the office ([phone]), and staff will be able to page Rosa directly.  6. In the event you have personal crisis, contact the following crisis numbers: Suicide Prevention Hotline 1-394.734.2806; DAV Helpline 8-168-960-NDVI; [facility] Emergency Room [phone]; text HELLO to 323476; or 346.     SPECIFIC RECOMMENDATIONS:     1.      Medications discussed at this encounter:     New Medications Ordered This Visit   Medications    mirtazapine (REMERON) 7.5 MG tablet     Sig: Take 1 tablet by mouth Every Night.     Dispense:  30 tablet     Refill:  1                       2.      Psychotherapy recommendations: We will continue therapy at future visits.     3.     Return to clinic: Return in about 2 months (around 12/7/2024).

## 2024-10-31 ENCOUNTER — TELEPHONE (OUTPATIENT)
Dept: PSYCHIATRY | Facility: CLINIC | Age: 64
End: 2024-10-31
Payer: MEDICARE

## 2024-10-31 NOTE — TELEPHONE ENCOUNTER
Pt says that on the tenth the pharmacy gave her 15 of the mirtazapine 15 mg.  Pt says that she has been cutting those in half, but that she will not have enough to last until she can get a new prescription.  Pt wqas told that the provider sent in a new prescription on 10/07 and that the patient should be able to pick that up 28 days after her last fill of mirtazapine, so the should not run out.  Pt stated that she only had three pills left.  I pointed out that once she cuts that in half it should last her six days, so she should not have a shortage.  Pt said that she would go talk to the pharmacy to make sure.  No further questions at this time

## 2024-11-21 ENCOUNTER — OFFICE VISIT (OUTPATIENT)
Dept: NEUROLOGY | Facility: CLINIC | Age: 64
End: 2024-11-21
Payer: MEDICARE

## 2024-11-21 VITALS
WEIGHT: 144.5 LBS | HEIGHT: 66 IN | DIASTOLIC BLOOD PRESSURE: 80 MMHG | SYSTOLIC BLOOD PRESSURE: 127 MMHG | BODY MASS INDEX: 23.22 KG/M2 | HEART RATE: 81 BPM

## 2024-11-21 DIAGNOSIS — M54.2 CERVICALGIA: ICD-10-CM

## 2024-11-21 DIAGNOSIS — M54.81 BILATERAL OCCIPITAL NEURALGIA: Primary | ICD-10-CM

## 2024-11-21 NOTE — PROGRESS NOTES
"Chief Complaint  Neurologic Problem    Subjective          Ann Perla presents to [facility]  History of Present Illness    History of Present Illness  The patient is a 64-year-old female who presents to the office for follow-up for migraine, remains on Emgality injections for preventative therapy.    She reports persistent neck pain and discomfort at the back of her head. Despite receiving Emgality injections, she has not noticed any significant improvement in her symptoms. She mentions that certain activities, such as mowing the grass, seem to exacerbate her pain. However, she has observed a slight reduction in her discomfort since the end of the mowing season.       Objective   Vital Signs:   /80   Pulse 81   Ht 167.6 cm (66\")   Wt 65.5 kg (144 lb 8 oz)   BMI 23.32 kg/m²     Physical Exam  HENT:      Head: Normocephalic.   Neck:      Comments: Tenderness over bilateral occipital regions   Pulmonary:      Effort: Pulmonary effort is normal.   Musculoskeletal:      Cervical back: Muscular tenderness present. Decreased range of motion.   Neurological:      Mental Status: She is alert and oriented to person, place, and time.      Sensory: Sensation is intact.      Motor: Motor function is intact.      Coordination: Coordination is intact.      Deep Tendon Reflexes: Reflexes are normal and symmetric.        Neurological Exam  Mental Status  Alert. Oriented to person, place, and time.    Sensory  Normal sensation.    Reflexes  Deep tendon reflexes are 2+ and symmetric in all four extremities.    Coordination    Finger-to-nose, rapid alternating movements and heel-to-shin normal bilaterally without dysmetria.      Result Review :               Assessment and Plan    There are no diagnoses linked to this encounter.    Assessment & Plan  1. Migraine.  The patient's migraines are likely being triggered by inflammation of the occipital nerves, which is connected " to her neck condition. She reports that the Emgality injections have not provided significant relief and has decided to discontinue them. If she experiences a return of severe symptoms, she should inform us immediately. She is advised to contact Usha's office to check on the status of her Emgality refill from the [facility] pharmacy.    2. Neck pain.  The patient's neck pain persists and is likely due to underlying pathology from previous surgeries. Symptomatic treatment remains the best option as neurosurgery does not consider her condition to be surgical. She reports that her pain has lessened since the end of the mowing season, suggesting that mowing grass may trigger her pain. She is advised to avoid activities that may exacerbate her symptoms. If pain returns she will let us know and we can do repeat trigger point injections.            Follow Up   Return if symptoms worsen or fail to improve.  Patient was given instructions and counseling regarding her condition or for health maintenance advice. Please see specific information pulled into the AVS if appropriate.       Patient or patient representative verbalized consent for the use of Ambient Listening during the visit with  LAURENCE Coburn for chart documentation. 11/22/2024  12:48 EST

## 2024-12-03 ENCOUNTER — OFFICE VISIT (OUTPATIENT)
Dept: BEHAVIORAL HEALTH | Facility: CLINIC | Age: 64
End: 2024-12-03
Payer: MEDICARE

## 2024-12-03 VITALS
SYSTOLIC BLOOD PRESSURE: 114 MMHG | BODY MASS INDEX: 23.46 KG/M2 | DIASTOLIC BLOOD PRESSURE: 78 MMHG | WEIGHT: 146 LBS | HEIGHT: 66 IN | HEART RATE: 60 BPM

## 2024-12-03 DIAGNOSIS — G24.09 DRUG-INDUCED TARDIVE DYSTONIA: ICD-10-CM

## 2024-12-03 DIAGNOSIS — T50.905A DRUG-INDUCED TARDIVE DYSTONIA: ICD-10-CM

## 2024-12-03 DIAGNOSIS — F33.0 MILD EPISODE OF RECURRENT MAJOR DEPRESSIVE DISORDER: Primary | ICD-10-CM

## 2024-12-03 DIAGNOSIS — F51.01 PRIMARY INSOMNIA: ICD-10-CM

## 2024-12-03 DIAGNOSIS — G24.01 TARDIVE DYSKINESIA: ICD-10-CM

## 2024-12-03 DIAGNOSIS — F41.1 GENERALIZED ANXIETY DISORDER: ICD-10-CM

## 2024-12-03 PROCEDURE — 1160F RVW MEDS BY RX/DR IN RCRD: CPT

## 2024-12-03 PROCEDURE — 99214 OFFICE O/P EST MOD 30 MIN: CPT

## 2024-12-03 PROCEDURE — 96127 BRIEF EMOTIONAL/BEHAV ASSMT: CPT

## 2024-12-03 PROCEDURE — 1159F MED LIST DOCD IN RCRD: CPT

## 2024-12-03 NOTE — PATIENT INSTRUCTIONS
1.  Please return to clinic at your next scheduled visit.  Please contact the clinic ([phone]) at least 24 hours prior in the event you need to cancel.  2.  Do no harm to yourself or others.    3.  Avoid alcohol and drugs.    4.  Take all medications as prescribed.  Please contact the clinic with any concerns. If you are in need of medication refills, please call the clinic at [phone].    5. Should you want to get in touch with your provider, LAURENCE Carter, please contact the office ([phone]), and staff will be able to page Rosa directly.  6. In the event you have personal crisis, contact the following crisis numbers: Suicide Prevention Hotline 1-144.141.5867; DAV Helpline 6-349-705-BLPL; [facility] Emergency Room [phone]; text HELLO to 390936; or 165.     SPECIFIC RECOMMENDATIONS:     1.      Medications discussed at this encounter:     New Medications Ordered This Visit   Medications    amitriptyline (ELAVIL) 25 MG tablet     Sig: Take 1 tablet by mouth Every Night.     Dispense:  30 tablet     Refill:  1                       2.      Psychotherapy recommendations: We will continue therapy at future visits.     3.     Return to clinic: Return in about 2 months (around 2/3/2025).

## 2024-12-03 NOTE — PROGRESS NOTES
"[facility]iatry  Medication Management Follow-up      Record Review is below for 12/03/2024 :   10/11/2024 TSH is 1.120, CMP is reassuring, triglycerides 162, lipid panels otherwise within normal limits  EKG Results:  10/11/2024 QTc is 389  Head Imaging:  CT Head Without Contrast (05/15/2023 14:21)  IMPRESSION:                 1. An acute intracranial abnormality is not appreciated.  Vital Signs:   /78   Pulse 60   Ht 167.6 cm (65.98\")   Wt 66.2 kg (146 lb)   BMI 23.58 kg/m²     Chief Complaint:  Insomnia. Vivid dreams.    History of Present Illness:   Ann Perla is a 64 y.o. female who presents today for follow-up and medication management for:    ICD-10-CM ICD-9-CM   1. Mild episode of recurrent major depressive disorder  F33.0 296.31   2. Generalized anxiety disorder  F41.1 300.02   3. Primary insomnia  F51.01 307.42   4. Drug-induced tardive dystonia  G24.09 333.72    T50.905A    5. Tardive dyskinesia  G24.01 333.85       12/03/2024   Depression and Anxiety  Progression of symptoms, frequency, and intensity is waxing and waning but better overall. Patient continues to experience feelings of sadness, low mood, lost of interest in usual activities, insomnia, low energy, excessive anxiety and worry, anxiety, difficulty controlling the worry, sleep disturbance, and these symptoms are causing significant distress or impairment. Patient denies feeling worthless, guilty, hopelessness,. Denies thinking about death and dying, suicidal ideation, planning, or intent to self-harm.  Denies AVH.  Clinically significant distress or impairment in social, occupational, or other important areas of functioning is waxing and waning but better overall.  Insomnia  Patient reports increase in vivid dreams, increased appetite and unwelcome weight gain since starting mirtazapine. Progression of symptoms, frequency, and intensity is gradually worsening. Patient experiences challenges difficulty " falling asleep (onset insomnia) with inability to fall asleep beyond 20-30 minutes, inability to maintain sleep (maintenance/middle insomnia) , early-morning wakefulness (late insomnia) before sleep reaches 6.5 hours, and has restless sleep, which occurs even under ideal circumstances.   Tardive Dyskinesia  Has been off of the quetiapine for a few months. Patient presents with complete remission of symptoms of tardive dyskinesia, no movements are noted.     10/07/2024 Patient is taking medications as prescribed and is tolerating them well.   Depression and Anxiety  Patient had COVID since our last encounter and was instructed by urgent care to hold her psychotropic medications while she was taking it. Progression of symptoms, frequency, and intensity is waxing and waning. Patient continues to experience feelings of sadness, low mood, increased appetite, psychomotor agitation, excessive anxiety and worry, anxiety, difficulty controlling the worry, restlessness, feeling keyed up or on edge, irritability, and these symptoms are causing significant distress or impairment. Patient denies feeling worthless, guilty, hopelessness,. Denies thinking about death and dying, suicidal ideation, planning, or intent to self-harm.  Denies AVH.  Clinically significant distress or impairment in social, occupational, or other important areas of functioning is waxing and waning.  Insomnia  Progression of symptoms, frequency, and intensity is waxing and waning. Patient experiences challenges difficulty falling asleep (onset insomnia) with inability to fall asleep beyond 20-30 minutes and inability to maintain sleep (maintenance/middle insomnia) , which occurs even under ideal circumstances.   Tardive Dyskinesia  Has been off of the quetiapine for approximately 1 month. Patient presents with complete remission of symptoms of tardive dyskinesia, no movements are noted.   Continue mirtazapine 7.5 mg at bedtime  Continue viibryd 40 mg  daily  Follow-up 1 month    09/17/2024 Patient is taking medications as prescribed and is tolerating them well.   Depression and Anxiety  Progression of symptoms, frequency, and intensity is waxing and waning but better overall. Patient continues to experience feelings of sadness, low mood, psychomotor agitation, excessive anxiety and worry, anxiety, difficulty controlling the worry, restlessness, feeling keyed up or on edge, and these symptoms are causing significant distress or impairment. Patient denies feeling worthless, guilty, hopelessness,. Denies thinking about death and dying, suicidal ideation, planning, or intent to self-harm.  Denies AVH.  Clinically significant distress or impairment in social, occupational, or other important areas of functioning is waxing and waning but better overall.  Tardive Dyskinesia  Has been off of the quetiapine for approximately 5 days. Patient presents with moderate improvement of symptoms of tardive dyskinesia which include loss of control of muscles, especially of face, arms, and legs. Mild repetitive involuntary movements noted:  Grimacing, Puckering and pursing of lips, lip smacking  Jerking of arms and legs  Insomnia  Only getting about 3-4 hours sleep. Patient has been taking trazodone 150 mg at bedtime, however it has not been as effective as the quetiapine for insomnia. We discontinued the quetiapine r/t tardive dyskinesia symptoms. Progression of symptoms, frequency, and intensity is gradually worsening. Patient experiences challenges difficulty falling asleep (onset insomnia) with inability to fall asleep beyond 20-30 minutes, inability to maintain sleep (maintenance/middle insomnia) , and early-morning wakefulness (late insomnia) before sleep reaches 6.5 hours, which occurs even under ideal circumstances.   Start mirtazapine 7.5 mg at bedtime  Discontinue trazodone  Continue viibryd 40 mg daily  Follow-up 1 month      08/26/2024   Depression  Patient endorses  gradually worsening feelings of sadness, low mood, and loss of interest in usual activities. These feelings are accompanied by anhedonia, fatigue and low energy most days, feeling worthless, guilty, and hopeless. Describes an inability to focus and concentrate that may interfere with daily tasks at home, work, or school. Movements that are unusually slow or agitated (a change which is often noticeable to others). Denies thinking about death and dying, suicidal ideation, planning, or intent to self-harm. These symptoms cause the patient clinically significant distress or impairment in social, occupational, or other important areas of functioning.  PHQ-9 is 9.  Generalized Anxiety Disorder (NELLA)   Patient experiences excessive anxiety and worry (apprehensive expectation), occurring more days than not for at least 6 months, about a number of events or activities (such as work or school performance). Finds it difficult to control the worry. The anxiety and worry are associated with restlessness or feeling keyed up or on edge, being easily fatigued, difficulty concentrating or mind going blank, irritability, muscle tension, and sleep disturbance (difficulty falling or staying asleep, or restless, unsatisfying sleep). The anxiety, worry, or physical symptoms cause clinically significant distress or impairment in social, occupational, or other important areas of functioning.   NELLA-7 is 12.  Insomnia  Sleep study several years ago, negative CELE. Sleep difficulty has been present at least 3 nights per week and for a period of at least 3 months. Patient experiences challenges difficulty falling asleep (onset insomnia) with inability to fall asleep beyond 20-30 minutes and inability to maintain sleep (maintenance/middle insomnia) , which occurs even under ideal circumstances. Is unable to sleep even with ample opportunity. Has been taking quetiapine for a few years but has been developing symptoms of TD.   Tardive  Dyskinesia  Patient presents with primary symptoms of tardive dyskinesia which include loss of control of muscles, especially of face, arms, and legs. Mild repetitive involuntary movements noted:  Grimacing, Puckering and pursing of lips, lip smacking  Jerking of arms and legs  Decrease quetiapine to 100 mg daily for 7 days, then decrease to 50 mg daily for 7 days, then decrease to 25 mg for 7 days, then stop.  Start trazodone 25 mg at bedtime for 7 days, then increase to 50 mg at bedtime  Continue viibryd 40 mg daily  Follow-up 1 month    Per Referring Provider 6/27/2024 Ann MAU Perla is also being seen today for   Headache-started emgality last week, weaned from topamax  Stopped 3 days ago  GERD-improved with omeprazole  Right eye concern-patient reports that her grandson noticed a red spot in her right eye.  She denies any pain with this.  Dizziness at times int he last 2-3 wks  She reports rooming spinning intermittently. Hx of vertigo. Vertigo sx have improved. Feeling off balance. Still on propanolol for migraine prevention. Low bp in the morning. Dizziness worse with position changes.     Depression/Anxiety-long hx of this. She has been on seroquel and viibryd  Denies SI/HI currently  She previously saw escobar    Past Psychiatric History:  Began Treatment: Adulthood  Diagnoses: Depression, anxiety  Psychiatrist: Yes  Therapist: Denies  Admission History: Denies  Medication Trials: quetiapine, viibryd, zoloft, prozac, wellbutrin, topomax  Family history suicide attempts: Denies  Family history suicide completions: Denies  Suicide Attempts: Denies  Self Harm: Denies  Substance use/abuse:Denies  Withdrawal Symptoms: Not applicable  Longest Period Sober: Not applicable  AA: Not applicable  Trauma/Childhood/ACE: Worked really hard all through childhood, farm work. Denies abuse, neglect, or major adverse events  Access to Firearms: Yes, safe, secured    Safety/Risk Assessment: Risk of self-harm acutely and  chronically is mild to moderate.    Static/Dynamic risk factors include diagnosis of mental disorder, psychosocial stressors,Recent stressor or loss and Social factors.      MENTAL STATUS EXAM   General Appearance:  Cleanly groomed and dressed and well developed  Eye Contact:  Good eye contact  Attitude:  Cooperative and polite  Motor Activity:  Normal gait, posture  Speech:  Normal rate, tone, volume  Mood and affect:  Normal, pleasant and euthymic  Hopelessness:  Denies  Thought Process:  Logical and goal-directed  Associations/ Thought Content:  No delusions  Hallucinations:  None  Suicidal Ideations:  Not present  Homicidal Ideation:  Not present  Sensorium:  Alert  Orientation:  Person, place, time and situation  Immediate Recall, Recent, and Remote Memory:  Intact  Attention Span/ Concentration:  Good  Fund of Knowledge:  Appropriate for age and educational level  Intellectual Functioning:  Average range  Insight:  Good  Judgement:  Good  Reliability:  Good  Impulse Control:  Good     PHQ-9 Depression Screening  PHQ-9 Total Score: 7    Little interest or pleasure in doing things? Several days   Feeling down, depressed, or hopeless? Several days   PHQ-2 Total Score 2   Trouble falling or staying asleep, or sleeping too much? Several days   Feeling tired or having little energy? Several days   Poor appetite or overeating? Not at all   Feeling bad about yourself - or that you are a failure or have let yourself or your family down? Several days   Trouble concentrating on things, such as reading the newspaper or watching television? Several days   Moving or speaking so slowly that other people could have noticed? Or the opposite - being so fidgety or restless that you have been moving around a lot more than usual? Several days   Thoughts that you would be better off dead, or of hurting yourself in some way? Not at all   PHQ-9 Total Score 7   If you checked off any problems, how difficult have these problems made it  for you to do your work, take care of things at home, or get along with other people? Somewhat difficult       NELLA-7  Feeling nervous, anxious or on edge: Several days  Not being able to stop or control worrying: Several days  Worrying too much about different things: Several days  Trouble Relaxing: Several days  Being so restless that it is hard to sit still: Several days  Feeling afraid as if something awful might happen: Several days  Becoming easily annoyed or irritable: Several days  NELLA 7 Total Score: 7  If you checked any problems, how difficult have these problems made it for you to do your work, take care of things at home, or get along with other people: Somewhat difficult    Review of systems is negative except as noted in HPI.  Labs:  WBC   Date Value Ref Range Status   05/31/2024 5.38 3.40 - 10.80 10*3/mm3 Final     Platelets   Date Value Ref Range Status   05/31/2024 266 140 - 450 10*3/mm3 Final     Hemoglobin   Date Value Ref Range Status   05/31/2024 13.2 12.0 - 15.9 g/dL Final     Hematocrit   Date Value Ref Range Status   05/31/2024 40.1 34.0 - 46.6 % Final     Glucose   Date Value Ref Range Status   10/11/2024 96 65 - 99 mg/dL Final     Creatinine   Date Value Ref Range Status   10/11/2024 0.96 0.57 - 1.00 mg/dL Final     ALT (SGPT)   Date Value Ref Range Status   10/11/2024 17 1 - 33 U/L Final     AST (SGOT)   Date Value Ref Range Status   10/11/2024 23 1 - 32 U/L Final     BUN   Date Value Ref Range Status   10/11/2024 18 8 - 23 mg/dL Final     eGFR   Date Value Ref Range Status   10/11/2024 66.6 >60.0 mL/min/1.73 Final     Total Cholesterol   Date Value Ref Range Status   10/11/2024 147 0 - 200 mg/dL Final     Triglycerides   Date Value Ref Range Status   10/11/2024 162 (H) 0 - 150 mg/dL Final     HDL Cholesterol   Date Value Ref Range Status   10/11/2024 38 (L) 40 - 60 mg/dL Final     LDL Cholesterol    Date Value Ref Range Status   10/11/2024 81 0 - 100 mg/dL Final     VLDL Cholesterol   Date  Value Ref Range Status   10/11/2024 28 5 - 40 mg/dL Final     LDL/HDL Ratio   Date Value Ref Range Status   10/11/2024 2.02  Final     Hemoglobin A1C   Date Value Ref Range Status   05/31/2024 5.60 4.80 - 5.60 % Final     TSH   Date Value Ref Range Status   10/11/2024 1.120 0.270 - 4.200 uIU/mL Final     Free T4   Date Value Ref Range Status   05/18/2020 1.2 0.9 - 1.8 ng/dL Final      Pain Management Panel           No data to display               Imaging Results:  XR Wrist Comp Min 3 Vw RT    Result Date: 8/26/2024  of the right wrist and right thumb shows grade 4 CMC arthritis Osteophytes over the ulnar styloid with ulnar styloid abutment    Current Medications:   Current Outpatient Medications   Medication Sig Dispense Refill    Calcium Carbonate-Vitamin D 600-10 MG-MCG per tablet Take 1 tablet by mouth Daily. 90 tablet 1    estradiol (ESTRACE VAGINAL) 0.1 MG/GM vaginal cream Place 0.5 gm PV and massage 0.5 gm to vulva and vestibule at night 2x/week 42.5 g 3    galcanezumab-gnlm (EMGALITY) 120 MG/ML auto-injector pen Inject 1 mL under the skin into the appropriate area as directed Every 30 (Thirty) Days. 1 mL 5    Lifitegrast (Xiidra) 5 % ophthalmic solution Administer 1 drop to both eyes 2 (Two) Times a Day.      Linzess 72 MCG capsule capsule Take 1 capsule by mouth Every Morning Before Breakfast. (Patient taking differently: Take 1 capsule by mouth As Needed.) 90 capsule 1    omeprazole (priLOSEC) 40 MG capsule Take 1 capsule by mouth Daily. 90 capsule 1    polyethylene glycol (MIRALAX) 17 GM/SCOOP powder       PreviDent 5000 Plus 1.1 % cream As Needed.      propranolol LA (Inderal LA) 60 MG 24 hr capsule Take 1 capsule by mouth Daily. 90 capsule 1    simethicone (Mi-Acid Gas Relief) 80 MG chewable tablet 1 tab(s) chewed 4 times a day PRN for 30 day(s)      simvastatin (ZOCOR) 20 MG tablet Take 1 tablet by mouth Every Night. 90 tablet 1    Synthroid 50 MCG tablet Take 1 tablet by mouth Every Morning. 90  tablet 1    valACYclovir (Valtrex) 500 MG tablet Take 1 tablet by mouth Every Other Day. 45 tablet 1    Vibegron 75 MG tablet Take 1 tablet by mouth Daily for 180 days. 90 tablet 1    vilazodone (VIIBRYD) 40 MG tablet tablet Take 1 tablet by mouth Daily. 90 tablet 1    amitriptyline (ELAVIL) 25 MG tablet Take 1 tablet by mouth Every Night. 30 tablet 1     No current facility-administered medications for this visit.     Problem List:  Patient Active Problem List   Diagnosis    Cervical disc herniation    Chronic kidney disease, stage 2 (mild)    Degenerative arthritis of thumb    Depression    Hx of blood clots    Lower urinary tract infectious disease    Multiple allergies    Trigger thumb of left hand    Trigger thumb of right hand    Osteoporosis    Chronic idiopathic constipation    Heartburn    Esophageal dysphagia    Cervicalgia    Bilateral occipital neuralgia    Menopause    Osteopenia of multiple sites    Genitourinary syndrome of menopause    Decreased libido    Hypoactive sexual desire disorder    Cubital tunnel syndrome on right    Complex tear of triangular fibrocartilage of right wrist    Heterotopic ossification    Wrist arthritis    Hand arthritis    Murmur, cardiac    Mixed hyperlipidemia    Hypothyroidism     Allergy:   No Known Allergies   Discontinued Medications:  Medications Discontinued During This Encounter   Medication Reason    mirtazapine (REMERON) 15 MG tablet Side effects       PLAN:   Presentation seems most consistent with DSM-V criteria for:  Diagnoses and all orders for this visit:    1. Mild episode of recurrent major depressive disorder (Primary)  -     amitriptyline (ELAVIL) 25 MG tablet; Take 1 tablet by mouth Every Night.  Dispense: 30 tablet; Refill: 1    2. Generalized anxiety disorder  -     amitriptyline (ELAVIL) 25 MG tablet; Take 1 tablet by mouth Every Night.  Dispense: 30 tablet; Refill: 1    3. Primary insomnia  -     amitriptyline (ELAVIL) 25 MG tablet; Take 1 tablet by  mouth Every Night.  Dispense: 30 tablet; Refill: 1    4. Drug-induced tardive dystonia    5. Tardive dyskinesia       Discontinue mirtazapine   Start amitriptyline 25 mg at bedtime  Continue viibryd 40 mg daily  Follow-up 1 month  Medication Education:   VIIBRYD (VILAZODONE) Start Viibryd 10 mg by mouth daily in the morning with food for 7 days, then 20 mg by mouth daily in the morning with food to target depressed mood and anxiety. Risks, benefits, alternatives discussed with patient including diarrhea, nausea, vomiting, dry mouth and insomnia. After discussion of these risks and benefits, the patient voiced understanding and agreed to proceed.    AMITRIPTYLINE (ELAVIL) Risks, benefits, alternatives discussed with patient including sedation, dizziness, falls, GI upset, constipation, urinary retention, dry mouth.  After discussion of these risks and benefits, the patient voiced understanding and agreed to proceed.    Medications:   New Medications Ordered This Visit   Medications    amitriptyline (ELAVIL) 25 MG tablet     Sig: Take 1 tablet by mouth Every Night.     Dispense:  30 tablet     Refill:  1      RONY reviewed.   Discussed medication options and treatment plan of prescribed medication as well as the risks, benefits, and side effects.  Patient is agreeable to call the office with any worsening of symptoms or onset of side effects.   Patient is agreeable to call 911 or go to the nearest ER should he/she begin having SI/HI.   Patient acknowledged, is agreeable to continue with current treatment plan, and was educated on the importance of compliance with treatment and follow-up appointments.  Addressed all questions and concerns.     Psychotherapy:    Provided minimal supportive therapy.  Functional status: Moderate symptoms OR moderate difficulty in social occupational or social functioning (51-60)  Prognosis: Fair with expectation for some response to treatment  Progress: waxing and waning      Follow-up:  Return in about 2 months (around 2/3/2025).     This document has been electronically signed by LAURENCE Carter  December 3, 2024 13:02 EST  Please note that portions of this note were completed with a voice recognition program.  Copied text in this note has been reviewed and is accurate as of 12/03/24

## 2024-12-06 ENCOUNTER — OFFICE VISIT (OUTPATIENT)
Dept: UROLOGY | Facility: CLINIC | Age: 64
End: 2024-12-06
Payer: MEDICARE

## 2024-12-06 VITALS
SYSTOLIC BLOOD PRESSURE: 126 MMHG | HEART RATE: 62 BPM | HEIGHT: 66 IN | BODY MASS INDEX: 23.95 KG/M2 | WEIGHT: 149 LBS | DIASTOLIC BLOOD PRESSURE: 76 MMHG | TEMPERATURE: 98 F

## 2024-12-06 DIAGNOSIS — R39.15 URINARY URGENCY: Primary | ICD-10-CM

## 2024-12-06 DIAGNOSIS — N32.81 OAB (OVERACTIVE BLADDER): ICD-10-CM

## 2024-12-06 NOTE — PROGRESS NOTES
"Chief Complaint  Urinary Urgency (3mo. F/u)    Patient is doing very well with Gemtesa 75 mg daily    Subjective no acute distress        Ann Perla presents to [facility]  History of Present Illness    64-year-old white female was having severe form of urinary urgency and incontinence.  Patient was started on Gemtesa 75 mg daily and she is doing very good.  Patient has no suprapubic pain or dysuria.  Patient was having irritative symptoms in the urinary bladder before and they have all vanished.    Patient has occasional urgency but no incontinence.  If she ever leaks it is only a drop or 2.  She does have frequency 7-8 times in the daytime but drinks a lot of fluids.  Nocturia 1 time.  Overactive bladder assessment tool is 4/25.  No dysuria or gross hematuria.  Patient is very pleased    Objective no acute distress  Vital Signs:   /76 (BP Location: Left arm, Patient Position: Sitting, Cuff Size: Adult)   Pulse 62   Temp 98 °F (36.7 °C) (Temporal)   Ht 167.6 cm (65.98\")   Wt 67.6 kg (149 lb)   BMI 24.06 kg/m²     No Known Allergies   Past medical history:  has a past medical history of Abdominal pain, LLQ (12/04/2015), Back pain, Bunion, Deep vein thrombosis, Depressive disorder, Disease of thyroid gland, Foot pain, right (04/08/2021), GERD (gastroesophageal reflux disease), Hallux valgus of right foot (12/03/2018), Hammer toe, Herpes genitalia, History of transfusion, HLD (hyperlipidemia), Migraine, Mixed incontinence urge and stress, OAB (overactive bladder) (01/24/2018), Seizures, and Urinary urgency.   Past surgical history:  has a past surgical history that includes Cervical fusion; Colonoscopy (2018, 2016); Cystoscopy; Esophagogastroduodenoscopy (2018); Foot surgery; Hand surgery (Left); Hand surgery (Right); Hiatal hernia repair (05/10/2019); Neck surgery; Retrograde pyelogram; Tubal ligation; Vein Surgery; Esophagogastroduodenoscopy (N/A, 08/28/2023); " Colonoscopy (N/A, 08/28/2023); and Upper gastrointestinal endoscopy.  Personal history: family history includes Breast cancer in her maternal aunt; Cancer in her maternal aunt, maternal grandfather, and other family members; Diabetes in her maternal grandmother; Heart disease in her brother; Hypertension in her maternal grandmother and mother; Kidney nephrosis in her brother; Lung cancer in an other family member; Stroke in her mother.  Social history:  reports that she has never smoked. She has never been exposed to tobacco smoke. She has never used smokeless tobacco. She reports that she does not currently use alcohol. She reports that she does not use drugs.    Review of Systems    Please see past medical and surgical history    Physical Exam  Constitutional:       General: She is not in acute distress.     Appearance: Normal appearance. She is normal weight. She is not ill-appearing or toxic-appearing.   HENT:      Head: Normocephalic and atraumatic.      Ears:      Comments: No loss of hearing  Pulmonary:      Effort: Pulmonary effort is normal. No respiratory distress.   Abdominal:      Palpations: There is no mass.      Tenderness: There is no abdominal tenderness. There is no right CVA tenderness or left CVA tenderness.   Musculoskeletal:         General: Normal range of motion.   Skin:     General: Skin is warm.      Coloration: Skin is not jaundiced.   Neurological:      General: No focal deficit present.      Mental Status: She is alert and oriented to person, place, and time.      Motor: No weakness.      Gait: Gait normal.   Psychiatric:         Mood and Affect: Mood normal.         Behavior: Behavior normal.         Thought Content: Thought content normal.         Judgment: Judgment normal.        Result Review :                 Assessment and Plan    Diagnoses and all orders for this visit:    1. Urinary urgency (Primary)  -     POC Urinalysis Dipstick, Automated  -     Vibegron 75 MG tablet; Take 1  tablet by mouth Daily for 180 days.  Dispense: 90 tablet; Refill: 1    2. OAB (overactive bladder)  -     Vibegron 75 MG tablet; Take 1 tablet by mouth Daily for 180 days.  Dispense: 90 tablet; Refill: 1    Patient is pretty happy with Gemtesa 75 mg daily.  Will continue that medication and refer her to Dr. Juan Monae for follow-up at her request.     Brief Urine Lab Results  (Last result in the past 365 days)        Color   Clarity   Blood   Leuk Est   Nitrite   Protein   CREAT   Urine HCG        12/06/24 1306 Yellow   Clear   Negative   Negative   Negative   Negative                    Follow Up   No follow-ups on file.  Patient was given instructions and counseling regarding her condition or for health maintenance advice. Please see specific information pulled into the AVS if appropriate.     Lizz King MD

## 2025-01-13 NOTE — PROGRESS NOTES
GYN Visit    CC: STEPHON    HPI:   64 y.o. Contraception or HRT: Post menopausal, using no HRT    History of Present Illness  The patient presents for evaluation of vaginal skin splitting.    She reports a perceived weight gain, as indicated by her recent scale readings. She has exhausted her supply of vaginal estrogen, which she believes was beneficial. However, she continues to experience skin splitting, leading to dyspareunia. During her last visit, an examination was conducted, and lichen sclerosus was ruled out. She attributes these symptoms to the aging process. She has also discontinued her testosterone treatment due to uncertainty about its efficacy.    Additionally, she reports experiencing night sweats, a symptom she has not encountered in several years. She recalls an incident where she woke up with a completely wet chest.    MEDICATIONS  Current: Vaginal estrogen cream.  Discontinued: Testosterone.   History: PMHx, Meds, Allergies, PSHx, Social Hx, and POBHx all reviewed and updated.  Physical Exam   PHYSICAL EXAM:  /76   Pulse 73   Wt 67.6 kg (149 lb)   LMP  (LMP Unknown)   Breastfeeding No   BMI 24.06 kg/m²   General- NAD, alert and oriented, appropriate  Psych- Normal mood, good memory        ASSESSMENT AND PLAN:  Diagnoses and all orders for this visit:    1. Genitourinary syndrome of menopause (Primary)  -     estradiol (ESTRACE VAGINAL) 0.1 MG/GM vaginal cream; Place 0.5 gm PV and massage 0.5 gm to vulva and vestibule at night every day for 4 weeks then decrease to 2x/week  Dispense: 42.5 g; Refill: 3    2. Female dyspareunia    3. Decreased libido    4. Hypoactive sexual desire disorder        Assessment & Plan  1.  GSM  she continues to experience skin splitting, which may be contributing to her persistent symptoms.  She states she feels better when she is using the estrogen cream.  A prescription for estrogen cream has been provided, with instructions to apply it nightly for the  next 4 weeks. Subsequently, the frequency of application will be reduced to twice weekly. If there is no improvement in her condition after 6 weeks, a repeat examination will be conducted.    2.  Dyspareunia  Very likely secondary to severe GSM.  Will do burst treatment of GSM and patient will follow-up in 6 weeks if no improvement in symptoms we will repeat her exam    3.  Decreased libido  Explained it is difficult to evaluate libido on the presence of dyspareunia.  Once we are able to resolve the symptoms of dyspareunia we can then continue to evaluate libido      Follow-up  The patient will follow up in 6 weeks.       Follow Up:  Return in about 6 weeks (around 2/25/2025).          Hemalatha Trejo MD  01/14/2025    Patient or patient representative verbalized consent for the use of Ambient Listening during the visit with  Hemalatha Trejo MD for chart documentation. 1/14/2025  11:16 EST

## 2025-01-14 ENCOUNTER — OFFICE VISIT (OUTPATIENT)
Dept: OBSTETRICS AND GYNECOLOGY | Facility: CLINIC | Age: 65
End: 2025-01-14
Payer: MEDICARE

## 2025-01-14 VITALS
DIASTOLIC BLOOD PRESSURE: 76 MMHG | BODY MASS INDEX: 24.06 KG/M2 | HEART RATE: 73 BPM | SYSTOLIC BLOOD PRESSURE: 109 MMHG | WEIGHT: 149 LBS

## 2025-01-14 DIAGNOSIS — F52.0 HYPOACTIVE SEXUAL DESIRE DISORDER: ICD-10-CM

## 2025-01-14 DIAGNOSIS — R68.82 DECREASED LIBIDO: ICD-10-CM

## 2025-01-14 DIAGNOSIS — N94.10 FEMALE DYSPAREUNIA: ICD-10-CM

## 2025-01-14 DIAGNOSIS — N95.8 GENITOURINARY SYNDROME OF MENOPAUSE: Primary | ICD-10-CM

## 2025-01-14 PROCEDURE — 99213 OFFICE O/P EST LOW 20 MIN: CPT | Performed by: OBSTETRICS & GYNECOLOGY

## 2025-01-14 RX ORDER — METRONIDAZOLE 7.5 MG/G
LOTION TOPICAL
COMMUNITY
Start: 2024-12-11

## 2025-01-14 RX ORDER — CALCIUM CARBONATE/VITAMIN D3 600 MG-10
TABLET ORAL
COMMUNITY
Start: 2024-12-11 | End: 2025-01-14

## 2025-01-14 RX ORDER — MUPIROCIN 20 MG/G
OINTMENT TOPICAL
COMMUNITY
Start: 2024-12-18

## 2025-01-14 RX ORDER — ESTRADIOL 0.1 MG/G
CREAM VAGINAL
Qty: 42.5 G | Refills: 3 | Status: SHIPPED | OUTPATIENT
Start: 2025-01-14

## 2025-01-16 ENCOUNTER — OFFICE VISIT (OUTPATIENT)
Dept: INTERNAL MEDICINE | Facility: CLINIC | Age: 65
End: 2025-01-16
Payer: MEDICARE

## 2025-01-16 VITALS
BODY MASS INDEX: 23.98 KG/M2 | WEIGHT: 149.2 LBS | RESPIRATION RATE: 16 BRPM | TEMPERATURE: 97.3 F | HEART RATE: 76 BPM | HEIGHT: 66 IN | SYSTOLIC BLOOD PRESSURE: 114 MMHG | DIASTOLIC BLOOD PRESSURE: 84 MMHG | OXYGEN SATURATION: 96 %

## 2025-01-16 DIAGNOSIS — F41.1 GENERALIZED ANXIETY DISORDER: ICD-10-CM

## 2025-01-16 DIAGNOSIS — K20.90 ESOPHAGITIS: ICD-10-CM

## 2025-01-16 DIAGNOSIS — F33.0 MILD EPISODE OF RECURRENT MAJOR DEPRESSIVE DISORDER: ICD-10-CM

## 2025-01-16 DIAGNOSIS — R51.9 NONINTRACTABLE HEADACHE, UNSPECIFIED CHRONICITY PATTERN, UNSPECIFIED HEADACHE TYPE: ICD-10-CM

## 2025-01-16 DIAGNOSIS — K59.04 CHRONIC IDIOPATHIC CONSTIPATION: ICD-10-CM

## 2025-01-16 DIAGNOSIS — M54.41 CHRONIC MIDLINE LOW BACK PAIN WITH BILATERAL SCIATICA: ICD-10-CM

## 2025-01-16 DIAGNOSIS — G89.29 CHRONIC MIDLINE LOW BACK PAIN WITH BILATERAL SCIATICA: ICD-10-CM

## 2025-01-16 DIAGNOSIS — E78.2 MIXED HYPERLIPIDEMIA: Primary | ICD-10-CM

## 2025-01-16 DIAGNOSIS — M54.42 CHRONIC MIDLINE LOW BACK PAIN WITH BILATERAL SCIATICA: ICD-10-CM

## 2025-01-16 DIAGNOSIS — F51.01 PRIMARY INSOMNIA: ICD-10-CM

## 2025-01-16 DIAGNOSIS — K44.9 PARAESOPHAGEAL HERNIA: ICD-10-CM

## 2025-01-16 DIAGNOSIS — E03.9 HYPOTHYROIDISM, UNSPECIFIED TYPE: ICD-10-CM

## 2025-01-16 PROCEDURE — 1126F AMNT PAIN NOTED NONE PRSNT: CPT | Performed by: NURSE PRACTITIONER

## 2025-01-16 PROCEDURE — G2211 COMPLEX E/M VISIT ADD ON: HCPCS | Performed by: NURSE PRACTITIONER

## 2025-01-16 PROCEDURE — 99214 OFFICE O/P EST MOD 30 MIN: CPT | Performed by: NURSE PRACTITIONER

## 2025-01-16 RX ORDER — SIMVASTATIN 20 MG
20 TABLET ORAL NIGHTLY
Qty: 90 TABLET | Refills: 1 | Status: SHIPPED | OUTPATIENT
Start: 2025-01-16

## 2025-01-16 RX ORDER — PROPRANOLOL HYDROCHLORIDE 60 MG/1
60 CAPSULE, EXTENDED RELEASE ORAL DAILY
Qty: 90 CAPSULE | Refills: 1 | Status: SHIPPED | OUTPATIENT
Start: 2025-01-16

## 2025-01-16 RX ORDER — LEVOTHYROXINE SODIUM 50 MCG
50 TABLET ORAL EVERY MORNING
Qty: 90 TABLET | Refills: 1 | Status: SHIPPED | OUTPATIENT
Start: 2025-01-16

## 2025-01-16 RX ORDER — OMEPRAZOLE 40 MG/1
40 CAPSULE, DELAYED RELEASE ORAL DAILY
Qty: 90 CAPSULE | Refills: 1 | Status: SHIPPED | OUTPATIENT
Start: 2025-01-16

## 2025-01-16 NOTE — PROGRESS NOTES
Chief Complaint  Hyperlipidemia, Hypothyroidism, and Back Pain (Lower back pain started a week ago. When first wakes up hurts to walk. )      Subjective      History of Present Illness  The patient is a 64-year-old female presenting for follow-up of hyperlipidemia and hypothyroidism. She reports experiencing significant low back pain upon waking over the past one to two weeks.    The patient describes the lower back pain as severe, localized to the midline, with occasional radiation to the buttocks. She has difficulty initiating ambulation upon rising. She has a history of cervical spine issues and received injections for both cervical and lumbar pain several mths ago. She denies any recent injury but reports some heavier lifting one day. The patient previously underwent physical therapy but is uncertain if it was specifically for her back. She prefers to perform home exercises due to unpredictable weather conditions.     The patient has been prescribed amitriptyline for sleep disturbances, which she finds more effective than her previous medication. She initially took half a tablet but has since increased to a full tablet. She anticipates running out of the medication before her next appointment. She has been on amitriptyline for six weeks and has been experiencing night sweats since starting the medication.    For headache management, she uses Emgality, which has been beneficial. However, she experienced a delay in receiving the medication due to weather conditions, resulting in a mild headache yesterday.    The patient has a history of restless leg syndrome, which has improved with medication. She reports no chest pain.    MEDICATIONS  Current: amitriptyline, Emgality, propranolol, omeprazole, simvastatin, Synthroid    IMMUNIZATIONS  She has received her COVID-19 vaccines.         Objective   Vital Signs:   Vitals:    01/16/25 0800   BP: 114/84   BP Location: Right arm   Patient Position: Sitting   Cuff Size:  "Adult   Pulse: 76   Resp: 16   Temp: 97.3 °F (36.3 °C)   TempSrc: Temporal   SpO2: 96%   Weight: 67.7 kg (149 lb 3.2 oz)   Height: 167.6 cm (65.98\")     Body mass index is 24.09 kg/m².    Wt Readings from Last 3 Encounters:   01/16/25 67.7 kg (149 lb 3.2 oz)   01/14/25 67.6 kg (149 lb)   12/06/24 67.6 kg (149 lb)     BP Readings from Last 3 Encounters:   01/16/25 114/84   01/14/25 109/76   12/06/24 126/76       Health Maintenance   Topic Date Due    TDAP/TD VACCINES (1 - Tdap) Never done    ZOSTER VACCINE (2 of 3) 02/22/2017    ANNUAL WELLNESS VISIT  06/27/2025    LIPID PANEL  10/11/2025    MAMMOGRAM  05/17/2026    COLORECTAL CANCER SCREENING  08/28/2026    PAP SMEAR  01/11/2027    HEPATITIS C SCREENING  Completed    COVID-19 Vaccine  Completed    INFLUENZA VACCINE  Completed    Pneumococcal Vaccine 0-64  Aged Out    HEMOGLOBIN A1C  Discontinued       Physical Exam  Vitals and nursing note reviewed.   Constitutional:       General: She is not in acute distress.     Appearance: Normal appearance.   HENT:      Head: Normocephalic and atraumatic.      Right Ear: External ear normal.      Left Ear: External ear normal.      Nose: Nose normal.      Mouth/Throat:      Mouth: Mucous membranes are moist.   Eyes:      Conjunctiva/sclera: Conjunctivae normal.   Cardiovascular:      Rate and Rhythm: Normal rate and regular rhythm.      Pulses: Normal pulses.      Heart sounds: Normal heart sounds. No murmur heard.     No friction rub. No gallop.   Pulmonary:      Effort: Pulmonary effort is normal. No respiratory distress.      Breath sounds: No wheezing, rhonchi or rales.   Musculoskeletal:      Cervical back: Neck supple.      Lumbar back: Tenderness present. Decreased range of motion.      Right lower leg: No edema.      Left lower leg: No edema.   Skin:     General: Skin is warm and dry.   Neurological:      General: No focal deficit present.      Mental Status: She is alert and oriented to person, place, and time. "   Psychiatric:         Mood and Affect: Mood normal.         Behavior: Behavior normal.        Physical Exam        Result Review :  The following data was reviewed by: LAURENCE Whyte on 01/16/2025:         Results      BMI is within normal parameters. No other follow-up for BMI required.       Procedures            Assessment & Plan  Mild episode of recurrent major depressive disorder           Generalized anxiety disorder           Primary insomnia         Chronic idiopathic constipation         Esophagitis         Paraesophageal hernia         Mixed hyperlipidemia            Hypothyroidism, unspecified type         Chronic midline low back pain with bilateral sciatica         Nonintractable headache, unspecified chronicity pattern, unspecified headache type              Assessment & Plan  1. Lower back pain:  - Advised to apply ice, rest, and perform range of motion and stretching exercises  - Avoid prolonged sitting and standing  - Provided rehabilitation exercises for sciatic pain  - Consider referral to pain management for another injection if no improvement in a few weeks  - Outpatient physical therapy may be initiated if no improvement    2. Insomnia:  - On amitriptyline for 6 weeks, helping with sleep  - Advised to request a refill via [facility] from [facility]    3. Headaches:  - Refill of Emgality provided    4. Hyperlipidemia:  - Cholesterol levels checked in October 2024, within normal limits  - Prescription for simvastatin provided  - Recheck cholesterol levels in 3 months    5. Hypothyroidism:  - Thyroid levels consistently within normal range  - Prescription for Synthroid provided  - Recheck thyroid levels in 3 months    6. Gastroesophageal reflux disease (GERD):  - Prescription for omeprazole provided    7. Restless leg syndrome:  - Improved with medication    8. Health maintenance:  - Due for shingles vaccine  - Prescription for Shingrix vaccine sent to [facility] in [place]  - Informed about  potential side effects: sore arm, body aches, and chills, typically lasting less than 24 hours        Patient or patient representative verbalized consent for the use of Ambient Listening during the visit with  LAURENCE Whyte for chart documentation. 1/16/2025  08:56 EST      FOLLOW UP  Return in about 3 months (around 4/16/2025).  Patient was given instructions and counseling regarding her condition or for health maintenance advice. Please see specific information pulled into the AVS if appropriate.     LAURENCE Whyte  01/16/25  08:56 EST    CURRENT & DISCONTINUED MEDICATIONS  Current Outpatient Medications   Medication Instructions    amitriptyline (ELAVIL) 25 mg, Oral, Nightly    Calcium Carbonate-Vitamin D 600-10 MG-MCG per tablet 1 tablet, Oral, Daily    estradiol (ESTRACE VAGINAL) 0.1 MG/GM vaginal cream Place 0.5 gm PV and massage 0.5 gm to vulva and vestibule at night every day for 4 weeks then decrease to 2x/week    galcanezumab-gnlm (EMGALITY) 120 mg, Subcutaneous, Every 30 Days    Lifitegrast (Xiidra) 5 % ophthalmic solution 1 drop, 2 Times Daily    Linzess 72 mcg, Oral, Every Morning Before Breakfast    metroNIDAZOLE (FLAGYL) 0.75 % lotion lotion     mupirocin (BACTROBAN) 2 % ointment     omeprazole (PRILOSEC) 40 mg, Oral, Daily    polyethylene glycol (MIRALAX) 17 GM/SCOOP powder No dose, route, or frequency recorded.    PreviDent 5000 Plus 1.1 % cream As Needed    propranolol LA (INDERAL LA) 60 mg, Oral, Daily    simethicone (Mi-Acid Gas Relief) 80 MG chewable tablet 1 tab(s) chewed 4 times a day PRN for 30 day(s)    simvastatin (ZOCOR) 20 mg, Oral, Nightly    Synthroid 50 mcg, Oral, Every Morning    valACYclovir (VALTREX) 500 mg, Oral, Every Other Day    Vibegron 75 mg, Oral, Daily    vilazodone (VIIBRYD) 40 mg, Oral, Daily    Zoster Vac Recomb Adjuvanted 50 MCG/0.5ML reconstituted suspension 0.5 mL, Intramuscular, Once, Repeat vaccine in 2-6 mths       Medications Discontinued During This  Encounter   Medication Reason    simvastatin (ZOCOR) 20 MG tablet Reorder    Calcium Carbonate-Vitamin D 600-10 MG-MCG per tablet Reorder    propranolol LA (Inderal LA) 60 MG 24 hr capsule Reorder    omeprazole (priLOSEC) 40 MG capsule Reorder    galcanezumab-gnlm (EMGALITY) 120 MG/ML auto-injector pen Reorder    Synthroid 50 MCG tablet Reorder

## 2025-01-20 ENCOUNTER — OFFICE VISIT (OUTPATIENT)
Dept: PODIATRY | Facility: CLINIC | Age: 65
End: 2025-01-20
Payer: MEDICARE

## 2025-01-20 VITALS
WEIGHT: 149 LBS | TEMPERATURE: 98 F | HEIGHT: 66 IN | OXYGEN SATURATION: 95 % | DIASTOLIC BLOOD PRESSURE: 81 MMHG | BODY MASS INDEX: 23.95 KG/M2 | HEART RATE: 60 BPM | SYSTOLIC BLOOD PRESSURE: 122 MMHG

## 2025-01-20 DIAGNOSIS — M79.672 FOOT PAIN, LEFT: ICD-10-CM

## 2025-01-20 DIAGNOSIS — M21.622 TAILOR'S BUNION OF LEFT FOOT: Primary | ICD-10-CM

## 2025-01-21 NOTE — PROGRESS NOTES
[facility] - PODIATRY    Today's Date: 01/21/25    Patient Name: Ann Perla  MRN: [id number]  CSN: [id number]  PCP: Usha Bonilla APRN,   Referring Provider: No ref. provider found    SUBJECTIVE     Chief Complaint   Patient presents with    Left Foot - Follow-up     New problem c/o pain ball of foot between 4th and 5th toes x several months no numbness     HPI: Ann Perla, a 64 y.o.female, presents to clinic.    64-year-old female presenting for foot pain.  She states it hurts on the outside of her foot.  Patient states before walk.  Patient states that it gets hard behind her fifth toe.  Patient states it is sore in shoe gear.    Past Medical History:   Diagnosis Date    Abdominal pain, LLQ 12/04/2015    Back pain     Bunion     Deep vein thrombosis     Depressive disorder     Disease of thyroid gland     Foot pain, right 04/08/2021    GERD (gastroesophageal reflux disease)     Hallux valgus of right foot 12/03/2018    Hammer toe     Herpes genitalia     History of transfusion     HLD (hyperlipidemia)     Migraine     Mixed incontinence urge and stress     OAB (overactive bladder) 01/24/2018    Seizures     Urinary urgency      Past Surgical History:   Procedure Laterality Date    CERVICAL FUSION      COLONOSCOPY  2018, 2016    COLONOSCOPY N/A 08/28/2023    Procedure: COLONOSCOPY WITH POLYPECTOMIES/HOT SNARE WITH ELEVIEW INJECTION AND CLIP APPLICATION X 2;  Surgeon: Jeanette Mcmahon MD;  Location: [facility] ENDOSCOPY;  Service: Gastroenterology;  Laterality: N/A;  COLON POLYPS, DIVERTICULOSIS, HEMORRHOIDS    CYSTOSCOPY      CYSTOSCOPY RETROGRADE PYELOGRAM      ENDOSCOPY  2018    ENDOSCOPY N/A 08/28/2023    Procedure: ESOPHAGOGASTRODUODENOSCOPY WITH BIOPSIES AND ESOPHAGEAL DILATION TO 18 MM;  Surgeon: Jeanette Mcmahon MD;  Location: [facility] ENDOSCOPY;  Service: Gastroenterology;  Laterality: N/A;  ESOPHAGITIS    FOOT SURGERY      HAND SURGERY Left     HAND SURGERY  Right     HIATAL HERNIA REPAIR  05/10/2019    NECK SURGERY      TUBAL ABDOMINAL LIGATION      UPPER GASTROINTESTINAL ENDOSCOPY      VEIN SURGERY       Family History   Problem Relation Age of Onset    Stroke Mother     Hypertension Mother     Heart disease Brother     Kidney nephrosis Brother     Hypertension Maternal Grandmother     Diabetes Maternal Grandmother     Cancer Maternal Grandfather         Unspecified    Cancer Maternal Aunt     Breast cancer Maternal Aunt     Cancer Other         Unspecified    Cancer Other         Unspecified    Lung cancer Other     Colon cancer Neg Hx      Social History     Socioeconomic History    Marital status:     Number of children: 2   Tobacco Use    Smoking status: Never     Passive exposure: Never    Smokeless tobacco: Never   Vaping Use    Vaping status: Never Used   Substance and Sexual Activity    Alcohol use: Not Currently     Comment: former- 7-8-19    Drug use: Never    Sexual activity: Not Currently     No Known Allergies  Current Outpatient Medications   Medication Sig Dispense Refill    amitriptyline (ELAVIL) 25 MG tablet Take 1 tablet by mouth Every Night. 30 tablet 1    Calcium Carbonate-Vitamin D 600-10 MG-MCG per tablet Take 1 tablet by mouth Daily. 90 tablet 1    estradiol (ESTRACE VAGINAL) 0.1 MG/GM vaginal cream Place 0.5 gm PV and massage 0.5 gm to vulva and vestibule at night every day for 4 weeks then decrease to 2x/week 42.5 g 3    galcanezumab-gnlm (EMGALITY) 120 MG/ML auto-injector pen Inject 1 mL under the skin into the appropriate area as directed Every 30 (Thirty) Days. 1 mL 5    Lifitegrast (Xiidra) 5 % ophthalmic solution Administer 1 drop to both eyes 2 (Two) Times a Day.      Linzess 72 MCG capsule capsule Take 1 capsule by mouth Every Morning Before Breakfast. (Patient taking differently: Take 1 capsule by mouth As Needed.) 90 capsule 1    metroNIDAZOLE (FLAGYL) 0.75 % lotion lotion       mupirocin (BACTROBAN) 2 % ointment        omeprazole (priLOSEC) 40 MG capsule Take 1 capsule by mouth Daily. 90 capsule 1    polyethylene glycol (MIRALAX) 17 GM/SCOOP powder       PreviDent 5000 Plus 1.1 % cream As Needed.      propranolol LA (Inderal LA) 60 MG 24 hr capsule Take 1 capsule by mouth Daily. 90 capsule 1    simethicone (Mi-Acid Gas Relief) 80 MG chewable tablet 1 tab(s) chewed 4 times a day PRN for 30 day(s)      simvastatin (ZOCOR) 20 MG tablet Take 1 tablet by mouth Every Night. 90 tablet 1    Synthroid 50 MCG tablet Take 1 tablet by mouth Every Morning. 90 tablet 1    valACYclovir (Valtrex) 500 MG tablet Take 1 tablet by mouth Every Other Day. 45 tablet 1    Vibegron 75 MG tablet Take 1 tablet by mouth Daily for 180 days. 90 tablet 1    vilazodone (VIIBRYD) 40 MG tablet tablet Take 1 tablet by mouth Daily. 90 tablet 1     No current facility-administered medications for this visit.     Review of Systems   Constitutional: Negative.    HENT: Negative.     Eyes: Negative.    Respiratory: Negative.     Cardiovascular: Negative.    Gastrointestinal: Negative.    Endocrine: Negative.    Genitourinary: Negative.    Musculoskeletal: Negative.         Left foot pain   Skin: Negative.    Allergic/Immunologic: Negative.    Neurological: Negative.    Hematological: Negative.    Psychiatric/Behavioral: Negative.     All other systems reviewed and are negative.      OBJECTIVE     Vitals:    01/20/25 1252   BP: 122/81   Pulse: 60   Temp: 98 °F (36.7 °C)   SpO2: 95%       WBC   Date Value Ref Range Status   05/31/2024 5.38 3.40 - 10.80 10*3/mm3 Final     RBC   Date Value Ref Range Status   05/31/2024 4.28 3.77 - 5.28 10*6/mm3 Final     Hemoglobin   Date Value Ref Range Status   05/31/2024 13.2 12.0 - 15.9 g/dL Final     Hematocrit   Date Value Ref Range Status   05/31/2024 40.1 34.0 - 46.6 % Final     MCV   Date Value Ref Range Status   05/31/2024 93.7 79.0 - 97.0 fL Final     MCH   Date Value Ref Range Status   05/31/2024 30.8 26.6 - 33.0 pg Final      MCHC   Date Value Ref Range Status   05/31/2024 32.9 31.5 - 35.7 g/dL Final     RDW   Date Value Ref Range Status   05/31/2024 12.6 12.3 - 15.4 % Final     RDW-SD   Date Value Ref Range Status   05/31/2024 42.9 37.0 - 54.0 fl Final     MPV   Date Value Ref Range Status   05/31/2024 10.6 6.0 - 12.0 fL Final     Platelets   Date Value Ref Range Status   05/31/2024 266 140 - 450 10*3/mm3 Final     Neutrophil %   Date Value Ref Range Status   05/31/2024 57.5 42.7 - 76.0 % Final     Lymphocyte %   Date Value Ref Range Status   05/31/2024 31.6 19.6 - 45.3 % Final     Monocyte %   Date Value Ref Range Status   05/31/2024 6.3 5.0 - 12.0 % Final     Eosinophil %   Date Value Ref Range Status   05/31/2024 3.5 0.3 - 6.2 % Final     Basophil %   Date Value Ref Range Status   05/31/2024 0.9 0.0 - 1.5 % Final     Immature Grans %   Date Value Ref Range Status   05/31/2024 0.2 0.0 - 0.5 % Final     Neutrophils, Absolute   Date Value Ref Range Status   05/31/2024 3.09 1.70 - 7.00 10*3/mm3 Final     Lymphocytes, Absolute   Date Value Ref Range Status   05/31/2024 1.70 0.70 - 3.10 10*3/mm3 Final     Monocytes, Absolute   Date Value Ref Range Status   05/31/2024 0.34 0.10 - 0.90 10*3/mm3 Final     Eosinophils, Absolute   Date Value Ref Range Status   05/31/2024 0.19 0.00 - 0.40 10*3/mm3 Final     Basophils, Absolute   Date Value Ref Range Status   05/31/2024 0.05 0.00 - 0.20 10*3/mm3 Final     Immature Grans, Absolute   Date Value Ref Range Status   05/31/2024 0.01 0.00 - 0.05 10*3/mm3 Final     nRBC   Date Value Ref Range Status   05/31/2024 0.0 0.0 - 0.2 /100 WBC Final         Lab Results   Component Value Date    GLUCOSE 96 10/11/2024    BUN 18 10/11/2024    CREATININE 0.96 10/11/2024    BCR 18.8 10/11/2024    K 4.2 10/11/2024    CO2 25.0 10/11/2024    CALCIUM 10.5 10/11/2024    ALBUMIN 4.3 10/11/2024    LABIL2 1.6 05/18/2020    AST 23 10/11/2024    ALT 17 10/11/2024       Patient seen in no apparent distress.      PHYSICAL  EXAM:     Foot/Ankle Exam    GENERAL  Appearance:  appears stated age  Orientation:  AAOx3  Affect:  appropriate  Gait:  unimpaired  Assistance:  independent  Right shoe gear: casual shoe  Left shoe gear: casual shoe    VASCULAR     Right Foot Vascularity   Normal vascular exam    Dorsalis pedis:  2+  Posterior tibial:  2+  Skin temperature:  warm  Edema grading:  None  CFT:  < 3 seconds  Pedal hair growth:  Present  Varicosities:  none     Left Foot Vascularity   Normal vascular exam    Dorsalis pedis:  2+  Posterior tibial:  2+  Skin temperature:  warm  Edema grading:  None  CFT:  < 3 seconds  Pedal hair growth:  Present  Varicosities:  none     NEUROLOGIC     Right Foot Neurologic   Normal sensation    Light touch sensation: normal  Vibratory sensation: normal  Hot/Cold sensation: normal  Protective Sensation using [place] Monofilament:   Sites intact: 10  Sites tested: 10     Left Foot Neurologic   Normal sensation    Light touch sensation: normal  Vibratory sensation: normal  Hot/Cold sensation:  normal  Protective Sensation using [place] Monofilament:   Sites intact: 10  Sites tested: 10    MUSCULOSKELETAL     Right Foot Musculoskeletal   Tailor's bunion: Yes       Left Foot Musculoskeletal   Tailor's bunion: Yes (Callus subfifth MPJ)      MUSCLE STRENGTH     Right Foot Muscle Strength   Foot dorsiflexion:  4  Foot plantar flexion:  4  Foot inversion:  4  Foot eversion:  4     Left Foot Muscle Strength   Foot dorsiflexion:  4  Foot plantar flexion:  4  Foot inversion:  4  Foot eversion:  4    RANGE OF MOTION     Right Foot Range of Motion   Foot and ankle ROM within normal limits       Left Foot Range of Motion   Foot and ankle ROM within normal limits      DERMATOLOGIC      Right Foot Dermatologic   Skin  Right foot skin is intact.      Left Foot Dermatologic   Skin  Left foot skin is intact.       RADIOLOGY:          No results found.    ASSESSMENT/PLAN     Diagnoses and all orders for this  visit:    1. Tailor's bunion of left foot (Primary)    2. Foot pain, left        Comprehensive lower extremity examination and evaluation was performed.    Patient again Outside of the shoe when walking.  Discussed surgical options for tailor's bunion.  Patient return to clinic in 3 to 4 months or as needed.    Discussed findings and treatment plan including risks, benefits, and treatment options with patient in detail. Patient agreed with treatment plan.    Medications and allergies reviewed.  Reviewed available lab values along with other pertinent labs.  These were discussed with the patient.    An After Visit Summary was printed and given to the patient at discharge, including (if requested) any available informative/educational handouts regarding diagnosis, treatment, or medications. All questions were answered to patient/family satisfaction. Should symptoms fail to improve or worsen they agree to call or return to clinic or to go to the Emergency Department. Discussed the importance of following up with any needed screening tests/labs/specialist appointments and any requested follow-up recommended by me today. Importance of maintaining follow-up discussed and patient accepts that missed appointments can delay diagnosis and potentially lead to worsening of conditions.    No follow-ups on file., or sooner if acute issues arise.    This document has been electronically signed by Dain Walker DPM on January 21, 2025 13:18 EST

## 2025-01-23 DIAGNOSIS — F41.1 GENERALIZED ANXIETY DISORDER: ICD-10-CM

## 2025-01-23 DIAGNOSIS — F33.0 MILD EPISODE OF RECURRENT MAJOR DEPRESSIVE DISORDER: ICD-10-CM

## 2025-01-23 DIAGNOSIS — F51.01 PRIMARY INSOMNIA: ICD-10-CM

## 2025-01-23 NOTE — TELEPHONE ENCOUNTER
REFILL REQUEST:     amitriptyline (ELAVIL) 25 MG tablet (12/03/2024)     F/UP- 02/11/2025.  LOV: 12/03/2024.    PT GOT A 30 DAY SUPPLY ON 01/01/2025. ANY AUTHORIZED REFILLS WILL BE PLACED ON PTS FILE AT THE PHARMACY.

## 2025-02-11 ENCOUNTER — OFFICE VISIT (OUTPATIENT)
Dept: BEHAVIORAL HEALTH | Facility: CLINIC | Age: 65
End: 2025-02-11
Payer: MEDICARE

## 2025-02-11 VITALS
SYSTOLIC BLOOD PRESSURE: 114 MMHG | WEIGHT: 149 LBS | HEART RATE: 64 BPM | DIASTOLIC BLOOD PRESSURE: 79 MMHG | HEIGHT: 66 IN | BODY MASS INDEX: 23.95 KG/M2

## 2025-02-11 DIAGNOSIS — F33.0 MILD EPISODE OF RECURRENT MAJOR DEPRESSIVE DISORDER: Primary | ICD-10-CM

## 2025-02-11 DIAGNOSIS — F51.01 PRIMARY INSOMNIA: ICD-10-CM

## 2025-02-11 DIAGNOSIS — T50.905A DRUG-INDUCED TARDIVE DYSTONIA: ICD-10-CM

## 2025-02-11 DIAGNOSIS — F41.1 GENERALIZED ANXIETY DISORDER: ICD-10-CM

## 2025-02-11 DIAGNOSIS — G24.09 DRUG-INDUCED TARDIVE DYSTONIA: ICD-10-CM

## 2025-02-11 DIAGNOSIS — G24.01 TARDIVE DYSKINESIA: ICD-10-CM

## 2025-02-11 RX ORDER — VILAZODONE HYDROCHLORIDE 40 MG/1
40 TABLET ORAL DAILY
Qty: 90 TABLET | Refills: 1 | Status: SHIPPED | OUTPATIENT
Start: 2025-02-11

## 2025-02-11 RX ORDER — AMITRIPTYLINE HYDROCHLORIDE 50 MG/1
50 TABLET ORAL NIGHTLY
Qty: 30 TABLET | Refills: 1 | Status: SHIPPED | OUTPATIENT
Start: 2025-02-11

## 2025-02-11 RX ORDER — OXYCODONE AND ACETAMINOPHEN 5; 325 MG/1; MG/1
1 TABLET ORAL
COMMUNITY
Start: 2025-02-03

## 2025-02-11 RX ORDER — PREDNISONE 20 MG/1
TABLET ORAL
COMMUNITY
Start: 2025-01-24

## 2025-02-11 NOTE — PATIENT INSTRUCTIONS
1.  Please return to clinic at your next scheduled visit.  Please contact the clinic ([phone]) at least 24 hours prior in the event you need to cancel.  2.  Do no harm to yourself or others.    3.  Avoid alcohol and drugs.    4.  Take all medications as prescribed.  Please contact the clinic with any concerns. If you are in need of medication refills, please call the clinic at [phone].    5. Should you want to get in touch with your provider, LAURENCE Carter, please contact the office ([phone]), and staff will be able to page Rosa directly.  6. In the event you have personal crisis, contact the following crisis numbers: Suicide Prevention Hotline 1-141.623.2332; DAV Helpline 4-385-757-GHJD; [facility] Emergency Room [phone]; text HELLO to 133250; or 645.     SPECIFIC RECOMMENDATIONS:     1.      Medications discussed at this encounter:     New Medications Ordered This Visit   Medications    amitriptyline (ELAVIL) 50 MG tablet     Sig: Take 1 tablet by mouth Every Night.     Dispense:  30 tablet     Refill:  1     Increase dose. Thx 4 All U Do!    vilazodone (VIIBRYD) 40 MG tablet tablet     Sig: Take 1 tablet by mouth Daily.     Dispense:  90 tablet     Refill:  1                       2.      Psychotherapy recommendations: We will continue therapy at future visits.     3.     Return to clinic: Return in about 2 months (around 4/11/2025).

## 2025-02-11 NOTE — PROGRESS NOTES
"[facility]iatry  Medication Management Follow-up      Record Review is below for 02/11/2025 :   10/11/2024TSH is 1.120, CMP is reassuring, triglycerides 162, lipid panels otherwise within normal limits  EKG Results:  10/11/2024 QTc is 389  Head Imaging:  CT Head Without Contrast (05/15/2023 14:21)  IMPRESSION:                 1. An acute intracranial abnormality is not appreciated.  Vital Signs:   /79   Pulse 64   Ht 167.6 cm (65.98\")   Wt 67.6 kg (149 lb)   BMI 24.06 kg/m²     Chief Complaint: Depression. Anxiety. Insomnia.     History of Present Illness:   Ann Perla is a 64 y.o. female who presents today for follow-up and medication management for:    ICD-10-CM ICD-9-CM   1. Mild episode of recurrent major depressive disorder  F33.0 296.31   2. Generalized anxiety disorder  F41.1 300.02   3. Primary insomnia  F51.01 307.42   4. Drug-induced tardive dystonia  G24.09 333.72    T50.905A    5. Tardive dyskinesia  G24.01 333.85       02/11/2025 Patient is taking medications as prescribed and is tolerating them well.   Depression and Anxiety  Recently had her 4th surgery on her hands. Progression of symptoms, frequency, and intensity is gradually improving. Patient continues to experience feelings of sadness, low mood, psychomotor agitation, excessive anxiety and worry, anxiety, difficulty controlling the worry, restlessness, feeling keyed up or on edge, irritability, muscle tension, decreased motivation and these symptoms are causing significant distress or impairment. Patient denies low energy, feeling worthless, guilty, hopelessness,. Denies thinking about death and dying, suicidal ideation, planning, or intent to self-harm.  Denies AVH.  Clinically significant distress or impairment in social, occupational, or other important areas of functioning is gradually improving.  Insomnia  Still not sleeping well, the amitriptyline was helping at first, but has not been as effective " recently. Progression of symptoms, frequency, and intensity is gradually improving. Patient experiences challenges difficulty falling asleep (onset insomnia) with inability to fall asleep beyond 20-30 minutes, inability to maintain sleep (maintenance/middle insomnia) , and has restless sleep, which occurs even under ideal circumstances.   Tardive Dyskinesia  Has been off of the quetiapine for a few months. Patient presents with complete remission of symptoms of tardive dyskinesia, no movements are noted.     12/03/2024   Depression and Anxiety  Progression of symptoms, frequency, and intensity is waxing and waning but better overall. Patient continues to experience feelings of sadness, low mood, lost of interest in usual activities, insomnia, low energy, excessive anxiety and worry, anxiety, difficulty controlling the worry, sleep disturbance, and these symptoms are causing significant distress or impairment. Patient denies feeling worthless, guilty, hopelessness,. Denies thinking about death and dying, suicidal ideation, planning, or intent to self-harm.  Denies AVH.  Clinically significant distress or impairment in social, occupational, or other important areas of functioning is waxing and waning but better overall.  Insomnia  Patient reports increase in vivid dreams, increased appetite and unwelcome weight gain since starting mirtazapine. Progression of symptoms, frequency, and intensity is gradually worsening. Patient experiences challenges difficulty falling asleep (onset insomnia) with inability to fall asleep beyond 20-30 minutes, inability to maintain sleep (maintenance/middle insomnia) , early-morning wakefulness (late insomnia) before sleep reaches 6.5 hours, and has restless sleep, which occurs even under ideal circumstances.   Tardive Dyskinesia  Has been off of the quetiapine for a few months. Patient presents with complete remission of symptoms of tardive dyskinesia, no movements are noted.    Discontinue mirtazapine   Start amitriptyline 25 mg at bedtime  Continue viibryd 40 mg daily  Follow-up 1 month    10/07/2024 Patient is taking medications as prescribed and is tolerating them well.   Depression and Anxiety  Patient had COVID since our last encounter and was instructed by urgent care to hold her psychotropic medications while she was taking it. Progression of symptoms, frequency, and intensity is waxing and waning. Patient continues to experience feelings of sadness, low mood, increased appetite, psychomotor agitation, excessive anxiety and worry, anxiety, difficulty controlling the worry, restlessness, feeling keyed up or on edge, irritability, and these symptoms are causing significant distress or impairment. Patient denies feeling worthless, guilty, hopelessness,. Denies thinking about death and dying, suicidal ideation, planning, or intent to self-harm.  Denies AVH.  Clinically significant distress or impairment in social, occupational, or other important areas of functioning is waxing and waning.  Insomnia  Progression of symptoms, frequency, and intensity is waxing and waning. Patient experiences challenges difficulty falling asleep (onset insomnia) with inability to fall asleep beyond 20-30 minutes and inability to maintain sleep (maintenance/middle insomnia) , which occurs even under ideal circumstances.   Tardive Dyskinesia  Has been off of the quetiapine for approximately 1 month. Patient presents with complete remission of symptoms of tardive dyskinesia, no movements are noted.   Continue mirtazapine 7.5 mg at bedtime  Continue viibryd 40 mg daily  Follow-up 1 month    09/17/2024 Patient is taking medications as prescribed and is tolerating them well.   Depression and Anxiety  Progression of symptoms, frequency, and intensity is waxing and waning but better overall. Patient continues to experience feelings of sadness, low mood, psychomotor agitation, excessive anxiety and worry,  anxiety, difficulty controlling the worry, restlessness, feeling keyed up or on edge, and these symptoms are causing significant distress or impairment. Patient denies feeling worthless, guilty, hopelessness,. Denies thinking about death and dying, suicidal ideation, planning, or intent to self-harm.  Denies AVH.  Clinically significant distress or impairment in social, occupational, or other important areas of functioning is waxing and waning but better overall.  Tardive Dyskinesia  Has been off of the quetiapine for approximately 5 days. Patient presents with moderate improvement of symptoms of tardive dyskinesia which include loss of control of muscles, especially of face, arms, and legs. Mild repetitive involuntary movements noted:  Grimacing, Puckering and pursing of lips, lip smacking  Jerking of arms and legs  Insomnia  Only getting about 3-4 hours sleep. Patient has been taking trazodone 150 mg at bedtime, however it has not been as effective as the quetiapine for insomnia. We discontinued the quetiapine r/t tardive dyskinesia symptoms. Progression of symptoms, frequency, and intensity is gradually worsening. Patient experiences challenges difficulty falling asleep (onset insomnia) with inability to fall asleep beyond 20-30 minutes, inability to maintain sleep (maintenance/middle insomnia) , and early-morning wakefulness (late insomnia) before sleep reaches 6.5 hours, which occurs even under ideal circumstances.   Start mirtazapine 7.5 mg at bedtime  Discontinue trazodone  Continue viibryd 40 mg daily  Follow-up 1 month      08/26/2024   Depression  Patient endorses gradually worsening feelings of sadness, low mood, and loss of interest in usual activities. These feelings are accompanied by anhedonia, fatigue and low energy most days, feeling worthless, guilty, and hopeless. Describes an inability to focus and concentrate that may interfere with daily tasks at home, work, or school. Movements that are  unusually slow or agitated (a change which is often noticeable to others). Denies thinking about death and dying, suicidal ideation, planning, or intent to self-harm. These symptoms cause the patient clinically significant distress or impairment in social, occupational, or other important areas of functioning.  PHQ-9 is 9.  Generalized Anxiety Disorder (NELLA)   Patient experiences excessive anxiety and worry (apprehensive expectation), occurring more days than not for at least 6 months, about a number of events or activities (such as work or school performance). Finds it difficult to control the worry. The anxiety and worry are associated with restlessness or feeling keyed up or on edge, being easily fatigued, difficulty concentrating or mind going blank, irritability, muscle tension, and sleep disturbance (difficulty falling or staying asleep, or restless, unsatisfying sleep). The anxiety, worry, or physical symptoms cause clinically significant distress or impairment in social, occupational, or other important areas of functioning.   NELLA-7 is 12.  Insomnia  Sleep study several years ago, negative CELE. Sleep difficulty has been present at least 3 nights per week and for a period of at least 3 months. Patient experiences challenges difficulty falling asleep (onset insomnia) with inability to fall asleep beyond 20-30 minutes and inability to maintain sleep (maintenance/middle insomnia) , which occurs even under ideal circumstances. Is unable to sleep even with ample opportunity. Has been taking quetiapine for a few years but has been developing symptoms of TD.   Tardive Dyskinesia  Patient presents with primary symptoms of tardive dyskinesia which include loss of control of muscles, especially of face, arms, and legs. Mild repetitive involuntary movements noted:  Grimacing, Puckering and pursing of lips, lip smacking  Jerking of arms and legs  Decrease quetiapine to 100 mg daily for 7 days, then decrease to 50 mg  daily for 7 days, then decrease to 25 mg for 7 days, then stop.  Start trazodone 25 mg at bedtime for 7 days, then increase to 50 mg at bedtime  Continue viibryd 40 mg daily  Follow-up 1 month    Per Referring Provider 6/27/2024 Ann Perla is also being seen today for   Headache-started emgality last week, weaned from topamax  Stopped 3 days ago  GERD-improved with omeprazole  Right eye concern-patient reports that her grandson noticed a red spot in her right eye.  She denies any pain with this.  Dizziness at times int he last 2-3 wks  She reports rooming spinning intermittently. Hx of vertigo. Vertigo sx have improved. Feeling off balance. Still on propanolol for migraine prevention. Low bp in the morning. Dizziness worse with position changes.     Depression/Anxiety-long hx of this. She has been on seroquel and viibryd  Denies SI/HI currently  She previously saw escobar    Past Psychiatric History:  Began Treatment: Adulthood  Diagnoses: Depression, anxiety  Psychiatrist: Yes  Therapist: Denies  Admission History: Denies  Medication Trials: quetiapine, viibryd, zoloft, prozac, wellbutrin, topomax  Family history suicide attempts: Denies  Family history suicide completions: Denies  Suicide Attempts: Denies  Self Harm: Denies  Substance use/abuse:Denies  Withdrawal Symptoms: Not applicable  Longest Period Sober: Not applicable  AA: Not applicable  Trauma/Childhood/ACE: Worked really hard all through childhood, farm work. Denies abuse, neglect, or major adverse events  Access to Firearms: Yes, safe, secured    Safety/Risk Assessment: Risk of self-harm acutely and chronically is mild to moderate.    Static/Dynamic risk factors include diagnosis of mental disorder, psychosocial stressors,Recent stressor or loss and Social factors.      MENTAL STATUS EXAM   General Appearance:  Cleanly groomed and dressed and well developed  Eye Contact:  Good eye contact  Attitude:  Cooperative and polite  Motor Activity:   Normal gait, posture  Speech:  Normal rate, tone, volume  Mood and affect:  Normal, pleasant and euthymic  Hopelessness:  Denies  Thought Process:  Logical and goal-directed  Associations/ Thought Content:  No delusions  Hallucinations:  None  Suicidal Ideations:  Not present  Homicidal Ideation:  Not present  Sensorium:  Alert  Orientation:  Person, place, time and situation  Immediate Recall, Recent, and Remote Memory:  Intact  Attention Span/ Concentration:  Good  Fund of Knowledge:  Appropriate for age and educational level  Intellectual Functioning:  Average range  Insight:  Good  Judgement:  Good  Reliability:  Good  Impulse Control:  Good       Review of systems is negative except as noted in HPI.  Labs:  WBC   Date Value Ref Range Status   05/31/2024 5.38 3.40 - 10.80 10*3/mm3 Final     Platelets   Date Value Ref Range Status   05/31/2024 266 140 - 450 10*3/mm3 Final     Hemoglobin   Date Value Ref Range Status   05/31/2024 13.2 12.0 - 15.9 g/dL Final     Hematocrit   Date Value Ref Range Status   05/31/2024 40.1 34.0 - 46.6 % Final     Glucose   Date Value Ref Range Status   10/11/2024 96 65 - 99 mg/dL Final     Creatinine   Date Value Ref Range Status   10/11/2024 0.96 0.57 - 1.00 mg/dL Final     ALT (SGPT)   Date Value Ref Range Status   10/11/2024 17 1 - 33 U/L Final     AST (SGOT)   Date Value Ref Range Status   10/11/2024 23 1 - 32 U/L Final     BUN   Date Value Ref Range Status   10/11/2024 18 8 - 23 mg/dL Final     eGFR   Date Value Ref Range Status   10/11/2024 66.6 >60.0 mL/min/1.73 Final     Total Cholesterol   Date Value Ref Range Status   10/11/2024 147 0 - 200 mg/dL Final     Triglycerides   Date Value Ref Range Status   10/11/2024 162 (H) 0 - 150 mg/dL Final     HDL Cholesterol   Date Value Ref Range Status   10/11/2024 38 (L) 40 - 60 mg/dL Final     LDL Cholesterol    Date Value Ref Range Status   10/11/2024 81 0 - 100 mg/dL Final     VLDL Cholesterol   Date Value Ref Range Status    10/11/2024 28 5 - 40 mg/dL Final     LDL/HDL Ratio   Date Value Ref Range Status   10/11/2024 2.02  Final     Hemoglobin A1C   Date Value Ref Range Status   05/31/2024 5.60 4.80 - 5.60 % Final     TSH   Date Value Ref Range Status   10/11/2024 1.120 0.270 - 4.200 uIU/mL Final     Free T4   Date Value Ref Range Status   05/18/2020 1.2 0.9 - 1.8 ng/dL Final      Pain Management Panel           No data to display               Imaging Results:  No Images in the past 120 days found..  Current Medications:   Current Outpatient Medications   Medication Sig Dispense Refill    amitriptyline (ELAVIL) 50 MG tablet Take 1 tablet by mouth Every Night. 30 tablet 1    Calcium Carbonate-Vitamin D 600-10 MG-MCG per tablet Take 1 tablet by mouth Daily. 90 tablet 1    estradiol (ESTRACE VAGINAL) 0.1 MG/GM vaginal cream Place 0.5 gm PV and massage 0.5 gm to vulva and vestibule at night every day for 4 weeks then decrease to 2x/week 42.5 g 3    galcanezumab-gnlm (EMGALITY) 120 MG/ML auto-injector pen Inject 1 mL under the skin into the appropriate area as directed Every 30 (Thirty) Days. 1 mL 5    Lifitegrast (Xiidra) 5 % ophthalmic solution Administer 1 drop to both eyes 2 (Two) Times a Day.      Linzess 72 MCG capsule capsule Take 1 capsule by mouth Every Morning Before Breakfast. (Patient taking differently: Take 1 capsule by mouth As Needed.) 90 capsule 1    metroNIDAZOLE (FLAGYL) 0.75 % lotion lotion       mupirocin (BACTROBAN) 2 % ointment       omeprazole (priLOSEC) 40 MG capsule Take 1 capsule by mouth Daily. 90 capsule 1    oxyCODONE-acetaminophen (PERCOCET) 5-325 MG per tablet Take 1 tablet by mouth.      polyethylene glycol (MIRALAX) 17 GM/SCOOP powder       predniSONE (DELTASONE) 20 MG tablet       PreviDent 5000 Plus 1.1 % cream As Needed.      propranolol LA (Inderal LA) 60 MG 24 hr capsule Take 1 capsule by mouth Daily. 90 capsule 1    simethicone (Mi-Acid Gas Relief) 80 MG chewable tablet 1 tab(s) chewed 4 times a day  PRN for 30 day(s)      simvastatin (ZOCOR) 20 MG tablet Take 1 tablet by mouth Every Night. 90 tablet 1    Synthroid 50 MCG tablet Take 1 tablet by mouth Every Morning. 90 tablet 1    valACYclovir (Valtrex) 500 MG tablet Take 1 tablet by mouth Every Other Day. 45 tablet 1    Vibegron 75 MG tablet Take 1 tablet by mouth Daily for 180 days. 90 tablet 1    vilazodone (VIIBRYD) 40 MG tablet tablet Take 1 tablet by mouth Daily. 90 tablet 1     No current facility-administered medications for this visit.     Problem List:  Patient Active Problem List   Diagnosis    Cervical disc herniation    Chronic kidney disease, stage 2 (mild)    Degenerative arthritis of thumb    Depression    Hx of blood clots    Lower urinary tract infectious disease    Multiple allergies    Trigger thumb of left hand    Trigger thumb of right hand    Osteoporosis    Chronic idiopathic constipation    Heartburn    Esophageal dysphagia    Cervicalgia    Bilateral occipital neuralgia    Menopause    Osteopenia of multiple sites    Genitourinary syndrome of menopause    Decreased libido    Hypoactive sexual desire disorder    Cubital tunnel syndrome on right    Complex tear of triangular fibrocartilage of right wrist    Heterotopic ossification    Wrist arthritis    Hand arthritis    Murmur, cardiac    Mixed hyperlipidemia    Hypothyroidism     Allergy:   No Known Allergies   Discontinued Medications:  Medications Discontinued During This Encounter   Medication Reason    vilazodone (VIIBRYD) 40 MG tablet tablet Reorder    amitriptyline (ELAVIL) 25 MG tablet        PLAN:   Presentation seems most consistent with DSM-V criteria for:  Diagnoses and all orders for this visit:    1. Mild episode of recurrent major depressive disorder (Primary)  -     amitriptyline (ELAVIL) 50 MG tablet; Take 1 tablet by mouth Every Night.  Dispense: 30 tablet; Refill: 1  -     vilazodone (VIIBRYD) 40 MG tablet tablet; Take 1 tablet by mouth Daily.  Dispense: 90 tablet;  Refill: 1    2. Generalized anxiety disorder  -     amitriptyline (ELAVIL) 50 MG tablet; Take 1 tablet by mouth Every Night.  Dispense: 30 tablet; Refill: 1  -     vilazodone (VIIBRYD) 40 MG tablet tablet; Take 1 tablet by mouth Daily.  Dispense: 90 tablet; Refill: 1    3. Primary insomnia  -     amitriptyline (ELAVIL) 50 MG tablet; Take 1 tablet by mouth Every Night.  Dispense: 30 tablet; Refill: 1    4. Drug-induced tardive dystonia    5. Tardive dyskinesia       Discontinue mirtazapine   Increase amitriptyline to 50 mg at bedtime  Continue viibryd 40 mg daily  Follow-up 1 month  Medication Education:   VIIBRYD (VILAZODONE) Start Viibryd 10 mg by mouth daily in the morning with food for 7 days, then 20 mg by mouth daily in the morning with food to target depressed mood and anxiety. Risks, benefits, alternatives discussed with patient including diarrhea, nausea, vomiting, dry mouth and insomnia. After discussion of these risks and benefits, the patient voiced understanding and agreed to proceed.    AMITRIPTYLINE (ELAVIL) Risks, benefits, alternatives discussed with patient including sedation, dizziness, falls, GI upset, constipation, urinary retention, dry mouth.  After discussion of these risks and benefits, the patient voiced understanding and agreed to proceed.  Medications:   New Medications Ordered This Visit   Medications    amitriptyline (ELAVIL) 50 MG tablet     Sig: Take 1 tablet by mouth Every Night.     Dispense:  30 tablet     Refill:  1     Increase dose. Thx 4 All U Do!    vilazodone (VIIBRYD) 40 MG tablet tablet     Sig: Take 1 tablet by mouth Daily.     Dispense:  90 tablet     Refill:  1      RONY reviewed.   Discussed medication options and treatment plan of prescribed medication as well as the risks, benefits, and side effects.  Patient is agreeable to call the office with any worsening of symptoms or onset of side effects.   Patient is agreeable to call 911 or go to the nearest ER should  he/she begin having SI/HI.   Patient acknowledged, is agreeable to continue with current treatment plan, and was educated on the importance of compliance with treatment and follow-up appointments.  Addressed all questions and concerns.     Psychotherapy:    Provided minimal supportive therapy.  Functional status: Moderate symptoms OR moderate difficulty in social occupational or social functioning (51-60)  Prognosis: Good chance of responding well to treatment   Progress: gradually improving      Follow-up: Return in about 2 months (around 4/11/2025).     This document has been electronically signed by LAURENCE Carter  February 11, 2025 12:07 EST  Please note that portions of this note were completed with a voice recognition program.  Copied text in this note has been reviewed and is accurate as of 02/11/25

## 2025-02-20 PROBLEM — N94.10 FEMALE DYSPAREUNIA: Status: ACTIVE | Noted: 2025-02-20

## 2025-02-26 RX ORDER — PROPRANOLOL HYDROCHLORIDE 60 MG/1
60 CAPSULE, EXTENDED RELEASE ORAL DAILY
Qty: 90 CAPSULE | Refills: 1 | Status: SHIPPED | OUTPATIENT
Start: 2025-02-26

## 2025-03-25 ENCOUNTER — OFFICE VISIT (OUTPATIENT)
Dept: INTERNAL MEDICINE | Facility: CLINIC | Age: 65
End: 2025-03-25
Payer: MEDICARE

## 2025-03-25 VITALS
RESPIRATION RATE: 16 BRPM | OXYGEN SATURATION: 99 % | TEMPERATURE: 98 F | SYSTOLIC BLOOD PRESSURE: 110 MMHG | HEART RATE: 61 BPM | WEIGHT: 151.4 LBS | DIASTOLIC BLOOD PRESSURE: 80 MMHG | BODY MASS INDEX: 24.33 KG/M2 | HEIGHT: 66 IN

## 2025-03-25 DIAGNOSIS — R19.7 DIARRHEA, UNSPECIFIED TYPE: ICD-10-CM

## 2025-03-25 DIAGNOSIS — M54.2 NECK PAIN: Primary | ICD-10-CM

## 2025-03-25 PROCEDURE — 1160F RVW MEDS BY RX/DR IN RCRD: CPT | Performed by: PHYSICIAN ASSISTANT

## 2025-03-25 PROCEDURE — 99213 OFFICE O/P EST LOW 20 MIN: CPT | Performed by: PHYSICIAN ASSISTANT

## 2025-03-25 PROCEDURE — 1159F MED LIST DOCD IN RCRD: CPT | Performed by: PHYSICIAN ASSISTANT

## 2025-03-25 PROCEDURE — 1125F AMNT PAIN NOTED PAIN PRSNT: CPT | Performed by: PHYSICIAN ASSISTANT

## 2025-03-25 RX ORDER — PROPRANOLOL HYDROCHLORIDE 60 MG/1
60 CAPSULE, EXTENDED RELEASE ORAL DAILY
Qty: 90 CAPSULE | Refills: 1 | Status: SHIPPED | OUTPATIENT
Start: 2025-03-25

## 2025-03-25 RX ORDER — CHLORHEXIDINE GLUCONATE ORAL RINSE 1.2 MG/ML
15 SOLUTION DENTAL 2 TIMES DAILY
COMMUNITY
Start: 2025-02-25

## 2025-03-25 RX ORDER — LEVOTHYROXINE SODIUM 50 MCG
50 TABLET ORAL EVERY MORNING
Qty: 90 TABLET | Refills: 1 | Status: SHIPPED | OUTPATIENT
Start: 2025-03-25

## 2025-03-25 NOTE — ASSESSMENT & PLAN NOTE
Resolved at this time.  Would recommend probiotic to restore normal elissa.  Symptoms like due to viral gastrointestinal infection which was self limiting.  If symptoms return could get stool studies and other work up.

## 2025-03-25 NOTE — PROGRESS NOTES
"Chief Complaint  Diarrhea (Had diarrhea for three days last week. ), GI Problem (Gas pains), and Neck Pain (Neck pain on the right side. Unable to lay on right side. )    Subjective          Ann Perla presents to [facility]    Right sided neck pain- patient has had MRI a couple years ago showing \"1. 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    2. No spinal canal stenosis  3. Mild multilevel neural foraminal narrowing as detailed above  4. No pathologic contrast enhancement\"  patient has seen Neurology and has done some injections which helped a little but she would like something else as she feels like it is getting worse.     Diarrhea/increased flatulence- symptoms began last week and lasted for about 3 days.  She is still having some loose stools but that are more formed than before.  She has been more gassy.  She has been having right sided abdominal pain but her last episode was a couple days ago.     Needing some medications refilled    Objective   Vital Signs:   /80 (BP Location: Left arm, Patient Position: Sitting, Cuff Size: Adult)   Pulse 61   Temp 98 °F (36.7 °C) (Temporal)   Resp 16   Ht 167.6 cm (65.98\")   Wt 68.7 kg (151 lb 6.4 oz)   SpO2 99%   BMI 24.45 kg/m²     Physical Exam  Vitals reviewed.   Constitutional:       Appearance: Normal appearance. She is well-developed.   HENT:      Head: Normocephalic and atraumatic.   Eyes:      Conjunctiva/sclera: Conjunctivae normal.      Pupils: Pupils are equal, round, and reactive to light.   Cardiovascular:      Rate and Rhythm: Normal rate and regular rhythm.      Heart sounds: No murmur heard.     No friction rub. No gallop.   Pulmonary:      Effort: Pulmonary effort is normal.      Breath sounds: Normal breath sounds. No wheezing or rhonchi.   Abdominal:      General: There is no distension.      Palpations: Abdomen is soft. There is no mass.      Tenderness: There is no " abdominal tenderness.      Comments: Hyperactive bowel sounds   Musculoskeletal:         General: Tenderness (R trapezius muscle at insertion of base of skull) present. No swelling. Normal range of motion.   Skin:     General: Skin is warm and dry.   Neurological:      Mental Status: She is alert and oriented to person, place, and time.      Cranial Nerves: No cranial nerve deficit.   Psychiatric:         Mood and Affect: Mood and affect normal.         Behavior: Behavior normal.         Thought Content: Thought content normal.         Judgment: Judgment normal.        Result Review :          Procedures      Assessment and Plan    Diagnoses and all orders for this visit:    1. Neck pain (Primary)  Assessment & Plan:  Will get xray of neck at this time.  Patient has seen Neurology in the past but would like to try other resources at this time.  I will place order for pain management and physical therapy.  Follow up with PCP as scheduled, sooner for any questions or concerns.    Orders:  -     XR Spine Cervical Complete 4 or 5 View (In Office)  -     Ambulatory Referral to Pain Management    2. Diarrhea, unspecified type  Assessment & Plan:  Resolved at this time.  Would recommend probiotic to restore normal elissa.  Symptoms like due to viral gastrointestinal infection which was self limiting.  If symptoms return could get stool studies and other work up.       Other orders  -     propranolol LA (Inderal LA) 60 MG 24 hr capsule; Take 1 capsule by mouth Daily.  Dispense: 90 capsule; Refill: 1  -     Synthroid 50 MCG tablet; Take 1 tablet by mouth Every Morning.  Dispense: 90 tablet; Refill: 1  -     Calcium Carbonate-Vitamin D 600-10 MG-MCG per tablet; Take 1 tablet by mouth Daily.  Dispense: 90 tablet; Refill: 1              Follow Up   No follow-ups on file.  Patient was given instructions and counseling regarding her condition or for health maintenance advice. Please see specific information pulled into the AVS if  appropriate.

## 2025-03-25 NOTE — ASSESSMENT & PLAN NOTE
Will get xray of neck at this time.  Patient has seen Neurology in the past but would like to try other resources at this time.  I will place order for pain management and physical therapy.  Follow up with PCP as scheduled, sooner for any questions or concerns.

## 2025-04-14 ENCOUNTER — TRANSCRIBE ORDERS (OUTPATIENT)
Dept: INTERNAL MEDICINE | Facility: CLINIC | Age: 65
End: 2025-04-14
Payer: MEDICARE

## 2025-04-14 DIAGNOSIS — Z12.31 BREAST CANCER SCREENING BY MAMMOGRAM: Primary | ICD-10-CM

## 2025-04-15 ENCOUNTER — OFFICE VISIT (OUTPATIENT)
Dept: BEHAVIORAL HEALTH | Facility: CLINIC | Age: 65
End: 2025-04-15
Payer: MEDICARE

## 2025-04-15 VITALS
WEIGHT: 150 LBS | DIASTOLIC BLOOD PRESSURE: 84 MMHG | BODY MASS INDEX: 24.11 KG/M2 | HEIGHT: 66 IN | SYSTOLIC BLOOD PRESSURE: 126 MMHG | HEART RATE: 86 BPM

## 2025-04-15 DIAGNOSIS — F51.01 PRIMARY INSOMNIA: ICD-10-CM

## 2025-04-15 DIAGNOSIS — F33.0 MILD EPISODE OF RECURRENT MAJOR DEPRESSIVE DISORDER: Primary | ICD-10-CM

## 2025-04-15 DIAGNOSIS — F41.1 GENERALIZED ANXIETY DISORDER: ICD-10-CM

## 2025-04-15 PROCEDURE — 1159F MED LIST DOCD IN RCRD: CPT

## 2025-04-15 PROCEDURE — 96127 BRIEF EMOTIONAL/BEHAV ASSMT: CPT

## 2025-04-15 PROCEDURE — 1160F RVW MEDS BY RX/DR IN RCRD: CPT

## 2025-04-15 PROCEDURE — 99214 OFFICE O/P EST MOD 30 MIN: CPT

## 2025-04-15 RX ORDER — MIRTAZAPINE 15 MG/1
1 TABLET, FILM COATED ORAL NIGHTLY
COMMUNITY
End: 2025-04-15 | Stop reason: SINTOL

## 2025-04-15 RX ORDER — CALCIUM CARBONATE/VITAMIN D3 600 MG-10
TABLET ORAL
COMMUNITY
Start: 2025-03-25 | End: 2025-04-16 | Stop reason: SDUPTHER

## 2025-04-15 RX ORDER — VILAZODONE HYDROCHLORIDE 40 MG/1
40 TABLET ORAL DAILY
Qty: 90 TABLET | Refills: 2 | Status: SHIPPED | OUTPATIENT
Start: 2025-04-15

## 2025-04-15 RX ORDER — PERFLUOROHEXYLOCTANE 1 MG/MG
SOLUTION OPHTHALMIC
COMMUNITY
Start: 2025-04-14

## 2025-04-15 RX ORDER — AMITRIPTYLINE HYDROCHLORIDE 50 MG/1
50 TABLET ORAL NIGHTLY
Qty: 30 TABLET | Refills: 2 | Status: SHIPPED | OUTPATIENT
Start: 2025-04-15

## 2025-04-15 NOTE — PROGRESS NOTES
"[facility] [place] [facility]  Medication Management Follow-up      Record Review is below for 04/15/2025 :   10/11/2024TSH is 1.120, CMP is reassuring, triglycerides 162, lipid panels otherwise within normal limits  EKG Results:  10/11/2024 QTc is 389  Head Imaging:  CT Head Without Contrast (05/15/2023 14:21)  IMPRESSION:                 1. An acute intracranial abnormality is not appreciated.  Vital Signs:   /84   Pulse 86   Ht 167.6 cm (65.98\")   Wt 68 kg (150 lb)   BMI 24.22 kg/m²     Chief Complaint: Depression. Anxiety. Insomnia.     History of Present Illness:   Ann Perla is a 64 y.o. female who presents today for follow-up and medication management for:    ICD-10-CM ICD-9-CM   1. Mild episode of recurrent major depressive disorder  F33.0 296.31   2. Generalized anxiety disorder  F41.1 300.02   3. Primary insomnia  F51.01 307.42       04/15/2025 Patient is taking medications as prescribed and is tolerating them well.   Depression and Anxiety  \"I am doing pretty good\" Progression of symptoms, frequency, and intensity is gradually improving. Patient continues to experience feelings of sadness, low mood, excessive anxiety and worry, anxiety, difficulty controlling the worry, sleep disturbance, PHQ-9 is 12and NELLA-7 is 6. and these symptoms are causing significant distress or impairment. Patient denies low energy, feeling worthless, guilty, hopelessness,. Denies thinking about death and dying, suicidal ideation, planning, or intent to self-harm.  Denies AVH.  Clinically significant distress or impairment in social, occupational, or other important areas of functioning is gradually improving.  Insomnia  Progression of symptoms, frequency, and intensity is gradually improving and medication efficacy noted. Patient experiences challenges difficulty falling asleep (onset insomnia) with inability to fall asleep beyond 20-30 minutes and inability to maintain sleep (maintenance/middle insomnia) " , which occurs even under ideal circumstances.       02/11/2025 Patient is taking medications as prescribed and is tolerating them well.   Depression and Anxiety  Recently had her 4th surgery on her hands. Progression of symptoms, frequency, and intensity is gradually improving. Patient continues to experience feelings of sadness, low mood, psychomotor agitation, excessive anxiety and worry, anxiety, difficulty controlling the worry, restlessness, feeling keyed up or on edge, irritability, muscle tension, decreased motivation and these symptoms are causing significant distress or impairment. Patient denies low energy, feeling worthless, guilty, hopelessness,. Denies thinking about death and dying, suicidal ideation, planning, or intent to self-harm.  Denies AVH.  Clinically significant distress or impairment in social, occupational, or other important areas of functioning is gradually improving.  Insomnia  Still not sleeping well, the amitriptyline was helping at first, but has not been as effective recently. Progression of symptoms, frequency, and intensity is gradually improving. Patient experiences challenges difficulty falling asleep (onset insomnia) with inability to fall asleep beyond 20-30 minutes, inability to maintain sleep (maintenance/middle insomnia) , and has restless sleep, which occurs even under ideal circumstances.   Tardive Dyskinesia  Has been off of the quetiapine for a few months. Patient presents with complete remission of symptoms of tardive dyskinesia, no movements are noted.   Discontinue mirtazapine   Increase amitriptyline to 50 mg at bedtime  Continue viibryd 40 mg daily  Follow-up 1 month    12/03/2024   Depression and Anxiety  Progression of symptoms, frequency, and intensity is waxing and waning but better overall. Patient continues to experience feelings of sadness, low mood, lost of interest in usual activities, insomnia, low energy, excessive anxiety and worry, anxiety, difficulty  controlling the worry, sleep disturbance, and these symptoms are causing significant distress or impairment. Patient denies feeling worthless, guilty, hopelessness,. Denies thinking about death and dying, suicidal ideation, planning, or intent to self-harm.  Denies AVH.  Clinically significant distress or impairment in social, occupational, or other important areas of functioning is waxing and waning but better overall.  Insomnia  Patient reports increase in vivid dreams, increased appetite and unwelcome weight gain since starting mirtazapine. Progression of symptoms, frequency, and intensity is gradually worsening. Patient experiences challenges difficulty falling asleep (onset insomnia) with inability to fall asleep beyond 20-30 minutes, inability to maintain sleep (maintenance/middle insomnia) , early-morning wakefulness (late insomnia) before sleep reaches 6.5 hours, and has restless sleep, which occurs even under ideal circumstances.   Tardive Dyskinesia  Has been off of the quetiapine for a few months. Patient presents with complete remission of symptoms of tardive dyskinesia, no movements are noted.   Discontinue mirtazapine   Start amitriptyline 25 mg at bedtime  Continue viibryd 40 mg daily  Follow-up 1 month    10/07/2024 Patient is taking medications as prescribed and is tolerating them well.   Depression and Anxiety  Patient had COVID since our last encounter and was instructed by urgent care to hold her psychotropic medications while she was taking it. Progression of symptoms, frequency, and intensity is waxing and waning. Patient continues to experience feelings of sadness, low mood, increased appetite, psychomotor agitation, excessive anxiety and worry, anxiety, difficulty controlling the worry, restlessness, feeling keyed up or on edge, irritability, and these symptoms are causing significant distress or impairment. Patient denies feeling worthless, guilty, hopelessness,. Denies thinking about death  and dying, suicidal ideation, planning, or intent to self-harm.  Denies AVH.  Clinically significant distress or impairment in social, occupational, or other important areas of functioning is waxing and waning.  Insomnia  Progression of symptoms, frequency, and intensity is waxing and waning. Patient experiences challenges difficulty falling asleep (onset insomnia) with inability to fall asleep beyond 20-30 minutes and inability to maintain sleep (maintenance/middle insomnia) , which occurs even under ideal circumstances.   Tardive Dyskinesia  Has been off of the quetiapine for approximately 1 month. Patient presents with complete remission of symptoms of tardive dyskinesia, no movements are noted.   Continue mirtazapine 7.5 mg at bedtime  Continue viibryd 40 mg daily  Follow-up 1 month    09/17/2024 Patient is taking medications as prescribed and is tolerating them well.   Depression and Anxiety  Progression of symptoms, frequency, and intensity is waxing and waning but better overall. Patient continues to experience feelings of sadness, low mood, psychomotor agitation, excessive anxiety and worry, anxiety, difficulty controlling the worry, restlessness, feeling keyed up or on edge, and these symptoms are causing significant distress or impairment. Patient denies feeling worthless, guilty, hopelessness,. Denies thinking about death and dying, suicidal ideation, planning, or intent to self-harm.  Denies AVH.  Clinically significant distress or impairment in social, occupational, or other important areas of functioning is waxing and waning but better overall.  Tardive Dyskinesia  Has been off of the quetiapine for approximately 5 days. Patient presents with moderate improvement of symptoms of tardive dyskinesia which include loss of control of muscles, especially of face, arms, and legs. Mild repetitive involuntary movements noted:  Grimacing, Puckering and pursing of lips, lip smacking  Jerking of arms and  legs  Insomnia  Only getting about 3-4 hours sleep. Patient has been taking trazodone 150 mg at bedtime, however it has not been as effective as the quetiapine for insomnia. We discontinued the quetiapine r/t tardive dyskinesia symptoms. Progression of symptoms, frequency, and intensity is gradually worsening. Patient experiences challenges difficulty falling asleep (onset insomnia) with inability to fall asleep beyond 20-30 minutes, inability to maintain sleep (maintenance/middle insomnia) , and early-morning wakefulness (late insomnia) before sleep reaches 6.5 hours, which occurs even under ideal circumstances.   Start mirtazapine 7.5 mg at bedtime  Discontinue trazodone  Continue viibryd 40 mg daily  Follow-up 1 month      08/26/2024   Depression  Patient endorses gradually worsening feelings of sadness, low mood, and loss of interest in usual activities. These feelings are accompanied by anhedonia, fatigue and low energy most days, feeling worthless, guilty, and hopeless. Describes an inability to focus and concentrate that may interfere with daily tasks at home, work, or school. Movements that are unusually slow or agitated (a change which is often noticeable to others). Denies thinking about death and dying, suicidal ideation, planning, or intent to self-harm. These symptoms cause the patient clinically significant distress or impairment in social, occupational, or other important areas of functioning.  PHQ-9 is 9.  Generalized Anxiety Disorder (NELLA)   Patient experiences excessive anxiety and worry (apprehensive expectation), occurring more days than not for at least 6 months, about a number of events or activities (such as work or school performance). Finds it difficult to control the worry. The anxiety and worry are associated with restlessness or feeling keyed up or on edge, being easily fatigued, difficulty concentrating or mind going blank, irritability, muscle tension, and sleep disturbance (difficulty  falling or staying asleep, or restless, unsatisfying sleep). The anxiety, worry, or physical symptoms cause clinically significant distress or impairment in social, occupational, or other important areas of functioning.   NELLA-7 is 12.  Insomnia  Sleep study several years ago, negative CELE. Sleep difficulty has been present at least 3 nights per week and for a period of at least 3 months. Patient experiences challenges difficulty falling asleep (onset insomnia) with inability to fall asleep beyond 20-30 minutes and inability to maintain sleep (maintenance/middle insomnia) , which occurs even under ideal circumstances. Is unable to sleep even with ample opportunity. Has been taking quetiapine for a few years but has been developing symptoms of TD.   Tardive Dyskinesia  Patient presents with primary symptoms of tardive dyskinesia which include loss of control of muscles, especially of face, arms, and legs. Mild repetitive involuntary movements noted:  Grimacing, Puckering and pursing of lips, lip smacking  Jerking of arms and legs  Decrease quetiapine to 100 mg daily for 7 days, then decrease to 50 mg daily for 7 days, then decrease to 25 mg for 7 days, then stop.  Start trazodone 25 mg at bedtime for 7 days, then increase to 50 mg at bedtime  Continue viibryd 40 mg daily  Follow-up 1 month    Per Referring Provider 6/27/2024 Ann Perla is also being seen today for   Headache-started emgality last week, weaned from topamax  Stopped 3 days ago  GERD-improved with omeprazole  Right eye concern-patient reports that her grandson noticed a red spot in her right eye.  She denies any pain with this.  Dizziness at times int he last 2-3 wks  She reports rooming spinning intermittently. Hx of vertigo. Vertigo sx have improved. Feeling off balance. Still on propanolol for migraine prevention. Low bp in the morning. Dizziness worse with position changes.     Depression/Anxiety-long hx of this. She has been on seroquel  and viibryd  Denies SI/HI currently  She previously saw escobar    Past Psychiatric History:  Began Treatment: Adulthood  Diagnoses: Depression, anxiety  Psychiatrist: Yes  Therapist: Denies  Admission History: Denies  Medication Trials: quetiapine, viibryd, zoloft, prozac, wellbutrin, topomax  Family history suicide attempts: Denies  Family history suicide completions: Denies  Suicide Attempts: Denies  Self Harm: Denies  Substance use/abuse:Denies  Withdrawal Symptoms: Not applicable  Longest Period Sober: Not applicable  AA: Not applicable  Trauma/Childhood/ACE: Worked really hard all through childhood, farm work. Denies abuse, neglect, or major adverse events  Access to Firearms: Yes, safe, secured    Safety/Risk Assessment: Risk of self-harm acutely and chronically is mild to moderate.    Static/Dynamic risk factors include diagnosis of mental disorder, psychosocial stressors,Recent stressor or loss and Social factors.      MENTAL STATUS EXAM   General Appearance:  Cleanly groomed and dressed and well developed  Eye Contact:  Good eye contact  Attitude:  Cooperative and polite  Motor Activity:  Normal gait, posture  Speech:  Normal rate, tone, volume  Mood and affect:  Normal, pleasant and euthymic  Hopelessness:  Denies  Thought Process:  Logical and goal-directed  Associations/ Thought Content:  No delusions  Hallucinations:  None  Suicidal Ideations:  Not present  Homicidal Ideation:  Not present  Sensorium:  Alert  Orientation:  Person, place, time and situation  Immediate Recall, Recent, and Remote Memory:  Intact  Attention Span/ Concentration:  Good  Fund of Knowledge:  Appropriate for age and educational level  Intellectual Functioning:  Average range  Insight:  Good  Judgement:  Good  Reliability:  Good  Impulse Control:  Good     PHQ-9 Depression Screening  PHQ-9 Total Score: 12    Little interest or pleasure in doing things? Several days   Feeling down, depressed, or hopeless? Several days   PHQ-2 Total  Score 2   Trouble falling or staying asleep, or sleeping too much? Over half   Feeling tired or having little energy? Over half   Poor appetite or overeating? Over half   Feeling bad about yourself - or that you are a failure or have let yourself or your family down? Over half   Trouble concentrating on things, such as reading the newspaper or watching television? Over half   Moving or speaking so slowly that other people could have noticed? Or the opposite - being so fidgety or restless that you have been moving around a lot more than usual? Not at all   Thoughts that you would be better off dead, or of hurting yourself in some way? Not at all   PHQ-9 Total Score 12   If you checked off any problems, how difficult have these problems made it for you to do your work, take care of things at home, or get along with other people? Somewhat difficult       NELLA-7  Feeling nervous, anxious or on edge: Several days  Not being able to stop or control worrying: Several days  Worrying too much about different things: Several days  Trouble Relaxing: Several days  Being so restless that it is hard to sit still: Not at all  Feeling afraid as if something awful might happen: Several days  Becoming easily annoyed or irritable: Several days  NELLA 7 Total Score: 6  If you checked any problems, how difficult have these problems made it for you to do your work, take care of things at home, or get along with other people: Somewhat difficult    Review of systems is negative except as noted in HPI.  Labs:  WBC   Date Value Ref Range Status   05/31/2024 5.38 3.40 - 10.80 10*3/mm3 Final     Platelets   Date Value Ref Range Status   05/31/2024 266 140 - 450 10*3/mm3 Final     Hemoglobin   Date Value Ref Range Status   05/31/2024 13.2 12.0 - 15.9 g/dL Final     Hematocrit   Date Value Ref Range Status   05/31/2024 40.1 34.0 - 46.6 % Final     Glucose   Date Value Ref Range Status   10/11/2024 96 65 - 99 mg/dL Final     Creatinine   Date Value  Ref Range Status   10/11/2024 0.96 0.57 - 1.00 mg/dL Final     ALT (SGPT)   Date Value Ref Range Status   10/11/2024 17 1 - 33 U/L Final     AST (SGOT)   Date Value Ref Range Status   10/11/2024 23 1 - 32 U/L Final     BUN   Date Value Ref Range Status   10/11/2024 18 8 - 23 mg/dL Final     eGFR   Date Value Ref Range Status   10/11/2024 66.6 >60.0 mL/min/1.73 Final     Total Cholesterol   Date Value Ref Range Status   10/11/2024 147 0 - 200 mg/dL Final     Triglycerides   Date Value Ref Range Status   10/11/2024 162 (H) 0 - 150 mg/dL Final     HDL Cholesterol   Date Value Ref Range Status   10/11/2024 38 (L) 40 - 60 mg/dL Final     LDL Cholesterol    Date Value Ref Range Status   10/11/2024 81 0 - 100 mg/dL Final     VLDL Cholesterol   Date Value Ref Range Status   10/11/2024 28 5 - 40 mg/dL Final     LDL/HDL Ratio   Date Value Ref Range Status   10/11/2024 2.02  Final     Hemoglobin A1C   Date Value Ref Range Status   05/31/2024 5.60 4.80 - 5.60 % Final     TSH   Date Value Ref Range Status   10/11/2024 1.120 0.270 - 4.200 uIU/mL Final     Free T4   Date Value Ref Range Status   05/18/2020 1.2 0.9 - 1.8 ng/dL Final      Pain Management Panel           No data to display               Imaging Results:  XR Wrist Left PA/AP & Lateral  Result Date: 3/26/2025  of the left thumb shows MP arthrodesis of the thumb and collapse of the CMC joint.     XR Spine Cervical Complete 4 or 5 View (In Office)  Result Date: 3/25/2025  Impression: Postoperative changes of ACDF at C4-5, corpectomy at C6, and ACDF at C6-7. No significant change from the previous radiographs. Electronically Signed: Camron Feldman MD  3/25/2025 11:01 AM EDT  Workstation ID: [id number]    Current Medications:   Current Outpatient Medications   Medication Sig Dispense Refill    amitriptyline (ELAVIL) 50 MG tablet Take 1 tablet by mouth Every Night. 30 tablet 2    calcium carb-cholecalciferol 600-10 MG-MCG tablet per tablet       Calcium Carbonate-Vitamin D  600-10 MG-MCG per tablet Take 1 tablet by mouth Daily. 90 tablet 1    chlorhexidine (PERIDEX) 0.12 % solution Apply 15 mL to the mouth or throat 2 (Two) Times a Day.      estradiol (ESTRACE VAGINAL) 0.1 MG/GM vaginal cream Place 0.5 gm PV and massage 0.5 gm to vulva and vestibule at night every day for 4 weeks then decrease to 2x/week 42.5 g 3    galcanezumab-gnlm (EMGALITY) 120 MG/ML auto-injector pen Inject 1 mL under the skin into the appropriate area as directed Every 30 (Thirty) Days. 1 mL 5    Lifitegrast (Xiidra) 5 % ophthalmic solution Administer 1 drop to both eyes 2 (Two) Times a Day.      Linzess 72 MCG capsule capsule Take 1 capsule by mouth Every Morning Before Breakfast. (Patient taking differently: Take 1 capsule by mouth As Needed.) 90 capsule 1    metroNIDAZOLE (FLAGYL) 0.75 % lotion lotion       Miebo 1.338 GM/ML solution       mupirocin (BACTROBAN) 2 % ointment       omeprazole (priLOSEC) 40 MG capsule Take 1 capsule by mouth Daily. 90 capsule 1    oxyCODONE-acetaminophen (PERCOCET) 5-325 MG per tablet Take 1 tablet by mouth.      polyethylene glycol (MIRALAX) 17 GM/SCOOP powder       predniSONE (DELTASONE) 20 MG tablet       PreviDent 5000 Plus 1.1 % cream As Needed.      propranolol LA (Inderal LA) 60 MG 24 hr capsule Take 1 capsule by mouth Daily. 90 capsule 1    simethicone (Mi-Acid Gas Relief) 80 MG chewable tablet 1 tab(s) chewed 4 times a day PRN for 30 day(s)      simvastatin (ZOCOR) 20 MG tablet Take 1 tablet by mouth Every Night. 90 tablet 1    Synthroid 50 MCG tablet Take 1 tablet by mouth Every Morning. 90 tablet 1    valACYclovir (Valtrex) 500 MG tablet Take 1 tablet by mouth Every Other Day. 45 tablet 1    Vibegron 75 MG tablet Take 1 tablet by mouth Daily for 180 days. 90 tablet 1    vilazodone (VIIBRYD) 40 MG tablet tablet Take 1 tablet by mouth Daily. 90 tablet 2     No current facility-administered medications for this visit.     Problem List:  Patient Active Problem List    Diagnosis    Cervical disc herniation    Chronic kidney disease, stage 2 (mild)    Degenerative arthritis of thumb    Depression    Hx of blood clots    Lower urinary tract infectious disease    Multiple allergies    Trigger thumb of left hand    Trigger thumb of right hand    Osteoporosis    Chronic idiopathic constipation    Heartburn    Esophageal dysphagia    Neck pain    Bilateral occipital neuralgia    Menopause    Osteopenia of multiple sites    Genitourinary syndrome of menopause    Decreased libido    Hypoactive sexual desire disorder    Cubital tunnel syndrome on right    Complex tear of triangular fibrocartilage of right wrist    Heterotopic ossification    Wrist arthritis    Hand arthritis    Murmur, cardiac    Mixed hyperlipidemia    Hypothyroidism    Female dyspareunia    Diarrhea     Allergy:   No Known Allergies   Discontinued Medications:  Medications Discontinued During This Encounter   Medication Reason    mirtazapine (REMERON) 15 MG tablet Side effects    amitriptyline (ELAVIL) 50 MG tablet Reorder    vilazodone (VIIBRYD) 40 MG tablet tablet Reorder       PLAN:   Presentation meets DSM-V criteria for:  Diagnoses and all orders for this visit:    1. Mild episode of recurrent major depressive disorder (Primary)  -     amitriptyline (ELAVIL) 50 MG tablet; Take 1 tablet by mouth Every Night.  Dispense: 30 tablet; Refill: 2  -     vilazodone (VIIBRYD) 40 MG tablet tablet; Take 1 tablet by mouth Daily.  Dispense: 90 tablet; Refill: 2    2. Generalized anxiety disorder  -     amitriptyline (ELAVIL) 50 MG tablet; Take 1 tablet by mouth Every Night.  Dispense: 30 tablet; Refill: 2  -     vilazodone (VIIBRYD) 40 MG tablet tablet; Take 1 tablet by mouth Daily.  Dispense: 90 tablet; Refill: 2    3. Primary insomnia  -     amitriptyline (ELAVIL) 50 MG tablet; Take 1 tablet by mouth Every Night.  Dispense: 30 tablet; Refill: 2         Continue amitriptyline to 50 mg at bedtime  Continue viibryd 40 mg  daily  Medication Education:   VIIBRYD (VILAZODONE) Start Viibryd 10 mg by mouth daily in the morning with food for 7 days, then 20 mg by mouth daily in the morning with food to target depressed mood and anxiety. Risks, benefits, alternatives discussed with patient including diarrhea, nausea, vomiting, dry mouth and insomnia. After discussion of these risks and benefits, the patient voiced understanding and agreed to proceed.    AMITRIPTYLINE (ELAVIL) Risks, benefits, alternatives discussed with patient including sedation, dizziness, falls, GI upset, constipation, urinary retention, dry mouth.  After discussion of these risks and benefits, the patient voiced understanding and agreed to proceed.  Medications:   New Medications Ordered This Visit   Medications    amitriptyline (ELAVIL) 50 MG tablet     Sig: Take 1 tablet by mouth Every Night.     Dispense:  30 tablet     Refill:  2     Thx 4 All U Do!    vilazodone (VIIBRYD) 40 MG tablet tablet     Sig: Take 1 tablet by mouth Daily.     Dispense:  90 tablet     Refill:  2      RONY reviewed.   Discussed medication options and treatment plan of prescribed medication as well as the risks, benefits, and side effects.  Patient is agreeable to call the office with any worsening of symptoms or onset of side effects.   Patient is agreeable to call 911 or go to the nearest ER should he/she begin having SI/HI.   Patient acknowledged, is agreeable to continue with current treatment plan, and was educated on the importance of compliance with treatment and follow-up appointments.  Addressed all questions and concerns.     Psychotherapy:    Provided minimal supportive therapy.  Functional status: Moderate symptoms OR moderate difficulty in social occupational or social functioning (51-60)  Prognosis: Good chance of responding well to treatment   Progress: gradually improving      Follow-up: Return in about 3 months (around 7/15/2025).     This document has been electronically  signed by LAURENCE Carter  April 15, 2025 15:36 EDT  Please note that portions of this note were completed with a voice recognition program.  Copied text in this note has been reviewed and is accurate as of 04/15/25

## 2025-04-15 NOTE — PATIENT INSTRUCTIONS
1.  Please return to clinic at your next scheduled visit.  Please contact the clinic ([phone]) at least 24 hours prior in the event you need to cancel.  2.  Do no harm to yourself or others.    3.  Avoid alcohol and drugs.    4.  Take all medications as prescribed.  Please contact the clinic with any concerns. If you are in need of medication refills, please call the clinic at [phone].    5. Should you want to get in touch with your provider, LAURENCE Carter, please contact the office ([phone]), and staff will be able to page Rosa directly.  6. In the event you have personal crisis, contact the following crisis numbers: Suicide Prevention Hotline 1-265.158.7122; [facility] Helpline 4-750-387-SRRR; [facility] Emergency Room [phone]; text HELLO to 097209; or 699.     SPECIFIC RECOMMENDATIONS:     1.      Medications discussed at this encounter:     New Medications Ordered This Visit   Medications    amitriptyline (ELAVIL) 50 MG tablet     Sig: Take 1 tablet by mouth Every Night.     Dispense:  30 tablet     Refill:  2     Thx 4 All U Do!    vilazodone (VIIBRYD) 40 MG tablet tablet     Sig: Take 1 tablet by mouth Daily.     Dispense:  90 tablet     Refill:  2                       2.      Psychotherapy recommendations: We will continue therapy at future visits.     3.     Return to clinic: Return in about 3 months (around 7/15/2025).

## 2025-04-16 ENCOUNTER — OFFICE VISIT (OUTPATIENT)
Dept: INTERNAL MEDICINE | Facility: CLINIC | Age: 65
End: 2025-04-16
Payer: MEDICARE

## 2025-04-16 VITALS
OXYGEN SATURATION: 97 % | DIASTOLIC BLOOD PRESSURE: 64 MMHG | TEMPERATURE: 97.5 F | HEART RATE: 66 BPM | SYSTOLIC BLOOD PRESSURE: 112 MMHG | BODY MASS INDEX: 24.59 KG/M2 | RESPIRATION RATE: 18 BRPM | HEIGHT: 66 IN | WEIGHT: 153 LBS

## 2025-04-16 DIAGNOSIS — K21.9 GASTROESOPHAGEAL REFLUX DISEASE, UNSPECIFIED WHETHER ESOPHAGITIS PRESENT: ICD-10-CM

## 2025-04-16 DIAGNOSIS — E03.9 HYPOTHYROIDISM, UNSPECIFIED TYPE: ICD-10-CM

## 2025-04-16 DIAGNOSIS — M81.0 OSTEOPOROSIS, UNSPECIFIED OSTEOPOROSIS TYPE, UNSPECIFIED PATHOLOGICAL FRACTURE PRESENCE: ICD-10-CM

## 2025-04-16 DIAGNOSIS — R51.9 NONINTRACTABLE HEADACHE, UNSPECIFIED CHRONICITY PATTERN, UNSPECIFIED HEADACHE TYPE: ICD-10-CM

## 2025-04-16 DIAGNOSIS — E78.2 MIXED HYPERLIPIDEMIA: Primary | ICD-10-CM

## 2025-04-16 DIAGNOSIS — G47.00 INSOMNIA, UNSPECIFIED TYPE: ICD-10-CM

## 2025-04-16 DIAGNOSIS — S16.1XXA STRAIN OF NECK MUSCLE, INITIAL ENCOUNTER: ICD-10-CM

## 2025-04-16 LAB
25(OH)D3 SERPL-MCNC: 40.5 NG/ML (ref 30–100)
ALBUMIN SERPL-MCNC: 3.9 G/DL (ref 3.5–5.2)
ALBUMIN/GLOB SERPL: 1.2 G/DL
ALP SERPL-CCNC: 111 U/L (ref 39–117)
ALT SERPL W P-5'-P-CCNC: 19 U/L (ref 1–33)
ANION GAP SERPL CALCULATED.3IONS-SCNC: 10 MMOL/L (ref 5–15)
AST SERPL-CCNC: 22 U/L (ref 1–32)
BASOPHILS # BLD AUTO: 0.07 10*3/MM3 (ref 0–0.2)
BASOPHILS NFR BLD AUTO: 1 % (ref 0–1.5)
BILIRUB SERPL-MCNC: 0.3 MG/DL (ref 0–1.2)
BUN SERPL-MCNC: 9 MG/DL (ref 8–23)
BUN/CREAT SERPL: 10.1 (ref 7–25)
CALCIUM SPEC-SCNC: 9.7 MG/DL (ref 8.6–10.5)
CHLORIDE SERPL-SCNC: 101 MMOL/L (ref 98–107)
CHOLEST SERPL-MCNC: 118 MG/DL (ref 0–200)
CO2 SERPL-SCNC: 27 MMOL/L (ref 22–29)
CREAT SERPL-MCNC: 0.89 MG/DL (ref 0.57–1)
DEPRECATED RDW RBC AUTO: 40.6 FL (ref 37–54)
EGFRCR SERPLBLD CKD-EPI 2021: 72.5 ML/MIN/1.73
EOSINOPHIL # BLD AUTO: 0.19 10*3/MM3 (ref 0–0.4)
EOSINOPHIL NFR BLD AUTO: 2.8 % (ref 0.3–6.2)
ERYTHROCYTE [DISTWIDTH] IN BLOOD BY AUTOMATED COUNT: 12.2 % (ref 12.3–15.4)
GLOBULIN UR ELPH-MCNC: 3.2 GM/DL
GLUCOSE SERPL-MCNC: 87 MG/DL (ref 65–99)
HCT VFR BLD AUTO: 37.4 % (ref 34–46.6)
HDLC SERPL-MCNC: 31 MG/DL (ref 40–60)
HGB BLD-MCNC: 12.5 G/DL (ref 12–15.9)
IMM GRANULOCYTES # BLD AUTO: 0.02 10*3/MM3 (ref 0–0.05)
IMM GRANULOCYTES NFR BLD AUTO: 0.3 % (ref 0–0.5)
LDLC SERPL CALC-MCNC: 53 MG/DL (ref 0–100)
LDLC/HDLC SERPL: 1.44 {RATIO}
LYMPHOCYTES # BLD AUTO: 1.42 10*3/MM3 (ref 0.7–3.1)
LYMPHOCYTES NFR BLD AUTO: 21.3 % (ref 19.6–45.3)
MAGNESIUM SERPL-MCNC: 2 MG/DL (ref 1.6–2.4)
MCH RBC QN AUTO: 30.6 PG (ref 26.6–33)
MCHC RBC AUTO-ENTMCNC: 33.4 G/DL (ref 31.5–35.7)
MCV RBC AUTO: 91.7 FL (ref 79–97)
MONOCYTES # BLD AUTO: 0.46 10*3/MM3 (ref 0.1–0.9)
MONOCYTES NFR BLD AUTO: 6.9 % (ref 5–12)
NEUTROPHILS NFR BLD AUTO: 4.51 10*3/MM3 (ref 1.7–7)
NEUTROPHILS NFR BLD AUTO: 67.7 % (ref 42.7–76)
NRBC BLD AUTO-RTO: 0 /100 WBC (ref 0–0.2)
PHOSPHATE SERPL-MCNC: 3.3 MG/DL (ref 2.5–4.5)
PLATELET # BLD AUTO: 334 10*3/MM3 (ref 140–450)
PMV BLD AUTO: 10.1 FL (ref 6–12)
POTASSIUM SERPL-SCNC: 4.6 MMOL/L (ref 3.5–5.2)
PROT SERPL-MCNC: 7.1 G/DL (ref 6–8.5)
RBC # BLD AUTO: 4.08 10*6/MM3 (ref 3.77–5.28)
SODIUM SERPL-SCNC: 138 MMOL/L (ref 136–145)
TRIGL SERPL-MCNC: 212 MG/DL (ref 0–150)
TSH SERPL DL<=0.05 MIU/L-ACNC: 1.02 UIU/ML (ref 0.27–4.2)
VLDLC SERPL-MCNC: 34 MG/DL (ref 5–40)
WBC NRBC COR # BLD AUTO: 6.67 10*3/MM3 (ref 3.4–10.8)

## 2025-04-16 PROCEDURE — 84443 ASSAY THYROID STIM HORMONE: CPT | Performed by: NURSE PRACTITIONER

## 2025-04-16 PROCEDURE — 84100 ASSAY OF PHOSPHORUS: CPT | Performed by: NURSE PRACTITIONER

## 2025-04-16 PROCEDURE — 85025 COMPLETE CBC W/AUTO DIFF WBC: CPT | Performed by: NURSE PRACTITIONER

## 2025-04-16 PROCEDURE — 83735 ASSAY OF MAGNESIUM: CPT | Performed by: NURSE PRACTITIONER

## 2025-04-16 PROCEDURE — 80053 COMPREHEN METABOLIC PANEL: CPT | Performed by: NURSE PRACTITIONER

## 2025-04-16 PROCEDURE — 80061 LIPID PANEL: CPT | Performed by: NURSE PRACTITIONER

## 2025-04-16 PROCEDURE — 82306 VITAMIN D 25 HYDROXY: CPT | Performed by: NURSE PRACTITIONER

## 2025-04-16 RX ORDER — SIMVASTATIN 20 MG
20 TABLET ORAL NIGHTLY
Qty: 90 TABLET | Refills: 1 | Status: SHIPPED | OUTPATIENT
Start: 2025-04-16

## 2025-04-16 RX ORDER — PROPRANOLOL HYDROCHLORIDE 60 MG/1
60 CAPSULE, EXTENDED RELEASE ORAL DAILY
Qty: 90 CAPSULE | Refills: 1 | Status: SHIPPED | OUTPATIENT
Start: 2025-04-16

## 2025-04-16 RX ORDER — VALACYCLOVIR HYDROCHLORIDE 500 MG/1
500 TABLET, FILM COATED ORAL EVERY OTHER DAY
Qty: 45 TABLET | Refills: 1 | Status: SHIPPED | OUTPATIENT
Start: 2025-04-16

## 2025-04-16 RX ORDER — OMEPRAZOLE 40 MG/1
40 CAPSULE, DELAYED RELEASE ORAL DAILY
Qty: 90 CAPSULE | Refills: 1 | Status: SHIPPED | OUTPATIENT
Start: 2025-04-16

## 2025-04-16 RX ORDER — LEVOTHYROXINE SODIUM 50 MCG
50 TABLET ORAL EVERY MORNING
Qty: 90 TABLET | Refills: 1 | Status: SHIPPED | OUTPATIENT
Start: 2025-04-16

## 2025-04-16 NOTE — PROGRESS NOTES
"Chief Complaint  Insomnia (3 month follow up ), Headache, Hyperlipidemia, Hypothyroidism, Heartburn, Restless Legs Syndrome, and Hand Pain (Right hand- had surgery February 2025. Patient stated she has a cyst on her wrist/thumb area.)      Subjective      History of Present Illness  The patient, a 64-year-old female, presents for a 3-month follow-up regarding insomnia, cephalalgia, hyperlipidemia, hypothyroidism, gastroesophageal reflux disease (GERD), and restless leg syndrome.    She reports persistent right hand post-operative pain following surgery in February 2025. An appointment with orthopedics is scheduled. The ganglion cyst in her right hand is noted to be increasing in size. She continues with occupational therapy independently exercises.    Right sided neck discomfort is managed with topical diclofenac (Voltaren gel), providing some relief. She finds exercises and stretches beneficial, although she has not engaged in formal physical therapy.    Her headaches persist but are manageable and have shown improvement in severity and frequency.    The patient is receiving Prolia injections, with blood work required within 30 days prior to administration. She denies issues with these.    Her sleep quality is satisfactory with medication prescribed by renee Martin. She denies experiencing chest pain or dizziness.    GERD is well-managed with omeprazole.             Objective   Vital Signs:   Vitals:    04/16/25 0809   BP: 112/64   BP Location: Left arm   Patient Position: Sitting   Cuff Size: Adult   Pulse: 66   Resp: 18   Temp: 97.5 °F (36.4 °C)   TempSrc: Temporal   SpO2: 97%   Weight: 69.4 kg (153 lb)   Height: 167.6 cm (65.98\")     Body mass index is 24.71 kg/m².    Wt Readings from Last 3 Encounters:   04/16/25 69.4 kg (153 lb)   04/15/25 68 kg (150 lb)   03/25/25 68.7 kg (151 lb 6.4 oz)     BP Readings from Last 3 Encounters:   04/16/25 112/64   04/15/25 126/84   03/25/25 110/80       Health Maintenance "   Topic Date Due    TDAP/TD VACCINES (1 - Tdap) Never done    Pneumococcal Vaccine 50+ (2 of 2 - PPSV23) 11/10/2023    ANNUAL WELLNESS VISIT  06/27/2025    INFLUENZA VACCINE  07/01/2025    LIPID PANEL  10/11/2025    MAMMOGRAM  05/17/2026    COLORECTAL CANCER SCREENING  08/28/2026    PAP SMEAR  01/11/2027    HEPATITIS C SCREENING  Completed    COVID-19 Vaccine  Completed    ZOSTER VACCINE  Completed    HEMOGLOBIN A1C  Discontinued       Physical Exam  Vitals and nursing note reviewed.   Constitutional:       General: She is not in acute distress.     Appearance: Normal appearance.   HENT:      Head: Normocephalic and atraumatic.      Right Ear: External ear normal.      Left Ear: External ear normal.      Nose: Nose normal.      Mouth/Throat:      Mouth: Mucous membranes are moist.   Eyes:      Conjunctiva/sclera: Conjunctivae normal.   Neck:     Cardiovascular:      Rate and Rhythm: Normal rate and regular rhythm.      Pulses: Normal pulses.      Heart sounds: Normal heart sounds. No murmur heard.     No friction rub. No gallop.   Pulmonary:      Effort: Pulmonary effort is normal. No respiratory distress.      Breath sounds: No wheezing, rhonchi or rales.   Musculoskeletal:        Hands:       Cervical back: Neck supple. No crepitus. Muscular tenderness present. No spinous process tenderness.      Right lower leg: No edema.      Left lower leg: No edema.      Comments: Mass present as above without erythema. Tenderness present   Skin:     General: Skin is warm and dry.   Neurological:      General: No focal deficit present.      Mental Status: She is alert and oriented to person, place, and time.   Psychiatric:         Mood and Affect: Mood normal.         Behavior: Behavior normal.        Physical Exam        Result Review :  The following data was reviewed by: LAURENCE Whyte on 04/16/2025:         Results      BMI is within normal parameters. No other follow-up for BMI required.       Procedures             Assessment & Plan  Mixed hyperlipidemia       Orders:    Lipid Panel    Hypothyroidism, unspecified type    Orders:    TSH    Insomnia, unspecified type    Orders:    CBC Auto Differential    Nonintractable headache, unspecified chronicity pattern, unspecified headache type    Orders:    CBC Auto Differential    Gastroesophageal reflux disease, unspecified whether esophagitis present    Orders:    CBC Auto Differential    Strain of neck muscle, initial encounter         Osteoporosis, unspecified osteoporosis type, unspecified pathological fracture presence    Orders:    Comprehensive Metabolic Panel    Magnesium    Phosphorus    Vitamin D 25 Hydroxy         Assessment & Plan  1. Post-surgical right hand pain  - Pain may be due to ganglion cyst  - Discuss with orthopedics  - Consider surgical removal if unresolved  - Continue self-directed occupational therapy    2. Neck pain  - Persistent neck pain, relieved by Voltaren gel  - Continue gel up to 4 times daily  - Beneficial physical therapy exercises  - No significant tenderness, discomfort in specific areas  - recommend patient PT if symptoms continue without significant improvement    3. Headaches  - Tolerable and improved  - Refill for propranolol provided    4. Hyperlipidemia  - Prescription for Zocor issued  - Lab tests today to monitor cholesterol  - Inform if adjustments needed  - Blood pressure well-controlled  - No new cardiovascular symptoms    5. Hypothyroidism  - Prescription for Synthroid issued  - Thyroid function tests today  - Inform if adjustments needed    6. Reflux  - Well-managed with omeprazole  - No new gastrointestinal symptoms  - Continue current regimen  - may try to wean at follow-up visit    7.  Osteoporosis  - Manageable with current treatment  - Continue current plan    8. Insomnia  - Manageable with current treatment  - No new symptoms or changes  - Continue current plan    9. Health maintenance  - Lab tests today for blood counts,  kidney, and liver function  - Blood work within 30 days before Prolia injection  - Follow-up in 3 months for Medicare wellness visit  - Caution advised with unsolicited Medicare calls due to potential fraud    Patient or patient representative verbalized consent for the use of Ambient Listening during the visit with  LAURENCE Whyte for chart documentation. 4/16/2025  09:10 EDT      FOLLOW UP  Return in about 3 months (around 7/16/2025) for Medicare Wellness.  Patient was given instructions and counseling regarding her condition or for health maintenance advice. Please see specific information pulled into the AVS if appropriate.     LAURENCE Whyte  04/16/25  09:10 EDT    CURRENT & DISCONTINUED MEDICATIONS  Current Outpatient Medications   Medication Instructions    amitriptyline (ELAVIL) 50 mg, Oral, Nightly    Calcium Carbonate-Vitamin D 600-10 MG-MCG per tablet 1 tablet, Oral, Daily    chlorhexidine (PERIDEX) 0.12 % solution 15 mL, 2 Times Daily    Diclofenac Sodium (VOLTAREN) 4 g, Topical, 4 Times Daily PRN    estradiol (ESTRACE VAGINAL) 0.1 MG/GM vaginal cream Place 0.5 gm PV and massage 0.5 gm to vulva and vestibule at night every day for 4 weeks then decrease to 2x/week    galcanezumab-gnlm (EMGALITY) 120 mg, Subcutaneous, Every 30 Days    Lifitegrast (Xiidra) 5 % ophthalmic solution 1 drop, 2 Times Daily    Linzess 72 mcg, Oral, Every Morning Before Breakfast    metroNIDAZOLE (FLAGYL) 0.75 % lotion lotion     Miebo 1.338 GM/ML solution     mupirocin (BACTROBAN) 2 % ointment     omeprazole (PRILOSEC) 40 mg, Oral, Daily    oxyCODONE-acetaminophen (PERCOCET) 5-325 MG per tablet 1 tablet    polyethylene glycol (MIRALAX) 17 GM/SCOOP powder No dose, route, or frequency recorded.    predniSONE (DELTASONE) 20 MG tablet     PreviDent 5000 Plus 1.1 % cream As Needed    propranolol LA (INDERAL LA) 60 mg, Oral, Daily    simethicone (Mi-Acid Gas Relief) 80 MG chewable tablet 1 tab(s) chewed 4 times a day PRN for  30 day(s)    simvastatin (ZOCOR) 20 mg, Oral, Nightly    Synthroid 50 mcg, Oral, Every Morning    valACYclovir (VALTREX) 500 mg, Oral, Every Other Day    Vibegron 75 mg, Oral, Daily    vilazodone (VIIBRYD) 40 mg, Oral, Daily       Medications Discontinued During This Encounter   Medication Reason    calcium carb-cholecalciferol 600-10 MG-MCG tablet per tablet Duplicate order    valACYclovir (Valtrex) 500 MG tablet Reorder    omeprazole (priLOSEC) 40 MG capsule Reorder    simvastatin (ZOCOR) 20 MG tablet Reorder    propranolol LA (Inderal LA) 60 MG 24 hr capsule Reorder    Synthroid 50 MCG tablet Reorder    Calcium Carbonate-Vitamin D 600-10 MG-MCG per tablet Reorder

## 2025-04-16 NOTE — SIGNIFICANT NOTE
Please have her schedule an appointment, then I will refill. Thanks!   Pt states about two weeks ago she started becoming lightheaded and her head feels funny.  She states it is worse at night and with rolling over and bending over.  She does NOT describe it as spinning.  She feels off balance as well.  She denies nausea, vomiting, and tinnitus.  She states she did fall three weeks ago and hit the trailer and the ground.  She reports she has felt weak and tired and had difficulty focusing.  She states the back of her head is tender and she has been getting headaches.  She is unsure if she hit her head when she fell.  Pt also reports a history of LBP with it being worse in the past three weeks.    Due to pt's subjective report, a PT evaluation for vertigo will not be performed at this point in time.  Will wait for MD to further evaluate.    Connie Proctor, PT, DPT

## 2025-05-08 ENCOUNTER — HOSPITAL ENCOUNTER (OUTPATIENT)
Dept: INFUSION THERAPY | Facility: HOSPITAL | Age: 65
Discharge: HOME | End: 2025-05-08
Admitting: NURSE PRACTITIONER
Payer: MEDICARE

## 2025-05-08 VITALS
DIASTOLIC BLOOD PRESSURE: 77 MMHG | OXYGEN SATURATION: 98 % | WEIGHT: 150.35 LBS | TEMPERATURE: 98.3 F | RESPIRATION RATE: 20 BRPM | BODY MASS INDEX: 24.16 KG/M2 | HEART RATE: 84 BPM | HEIGHT: 66 IN | SYSTOLIC BLOOD PRESSURE: 120 MMHG

## 2025-05-08 DIAGNOSIS — M81.0 OSTEOPOROSIS, UNSPECIFIED OSTEOPOROSIS TYPE, UNSPECIFIED PATHOLOGICAL FRACTURE PRESENCE: Primary | ICD-10-CM

## 2025-05-08 PROCEDURE — 96372 THER/PROPH/DIAG INJ SC/IM: CPT

## 2025-05-08 PROCEDURE — 25010000002 DENOSUMAB 60 MG/ML SOLUTION PREFILLED SYRINGE: Performed by: NURSE PRACTITIONER

## 2025-05-08 RX ADMIN — DENOSUMAB 60 MG: 60 INJECTION SUBCUTANEOUS at 07:57

## 2025-05-21 ENCOUNTER — HOSPITAL ENCOUNTER (OUTPATIENT)
Dept: MAMMOGRAPHY | Facility: HOSPITAL | Age: 65
Discharge: HOME | End: 2025-05-21
Admitting: NURSE PRACTITIONER
Payer: MEDICARE

## 2025-05-21 DIAGNOSIS — Z12.31 BREAST CANCER SCREENING BY MAMMOGRAM: ICD-10-CM

## 2025-05-21 PROCEDURE — 77063 BREAST TOMOSYNTHESIS BI: CPT

## 2025-05-21 PROCEDURE — 77067 SCR MAMMO BI INCL CAD: CPT

## 2025-06-19 NOTE — PROGRESS NOTES
Chief Complaint: Urinary Urgency    Subjective         History of Present Illness  Ann Perla is a 64 y.o. female presents to [facility] to be seen for urinary urgency/OAB.    Patient was previously seen by Dr. King with last visit on 12/6/2024 for her urinary incontinence and urgency.  She was doing well on Gemtesa at that visit.  She is here for follow-up.    History of Present Illness  The patient is a 64-year-old female here for follow-up of urinary incontinence and urgency.    She has been under the care of Dr. King, who prescribed Gemtesa for her condition. Since starting this medication, she reports significant improvement, with only two episodes of incontinence.      She acknowledges the importance of maintaining adequate hydration.     Her symptoms of frequency and urgency have notably improved, and she reports no instances of leakage beyond the aforementioned two episodes.     She experiences nocturia once per night and reports no hematuria.     She has no history of renal calculi or surgical interventions on her bladder or kidneys.     She has a past medical history of deep vein thrombosis (DVT) on two occasions but reports no other cardiac or pulmonary issues. She was previously informed of a minor heart murmur, but no treatment was initiated.     She is not currently on anticoagulant therapy.     She reports no history of smoking.    SOCIAL HISTORY  She does not smoke.    FAMILY HISTORY  Her brother has a history of kidney stones. She reports no family history of kidney cancer.      Previous 12/6/2024:  64-year-old white female was having severe form of urinary urgency and incontinence.  Patient was started on Gemtesa 75 mg daily and she is doing very good.  Patient has no suprapubic pain or dysuria.  Patient was having irritative symptoms in the urinary bladder before and they have all vanished.     Patient has occasional urgency but no incontinence.  If she ever  leaks it is only a drop or 2.  She does have frequency 7-8 times in the daytime but drinks a lot of fluids.  Nocturia 1 time.  Overactive bladder assessment tool is 4/25.  No dysuria or gross hematuria.  Patient is very pleased       Objective     Past Medical History:   Diagnosis Date   • Abdominal pain, LLQ 12/04/2015   • Back pain    • Bunion    • Deep vein thrombosis    • Depressive disorder    • Disease of thyroid gland    • Foot pain, right 04/08/2021   • GERD (gastroesophageal reflux disease)    • Hallux valgus of right foot 12/03/2018   • Hammer toe    • Herpes genitalia    • History of transfusion    • HLD (hyperlipidemia)    • Migraine    • Mixed incontinence urge and stress    • OAB (overactive bladder) 01/24/2018   • Seizures    • Urinary urgency        Past Surgical History:   Procedure Laterality Date   • CERVICAL FUSION     • COLONOSCOPY  2018, 2016   • COLONOSCOPY N/A 08/28/2023    Procedure: COLONOSCOPY WITH POLYPECTOMIES/HOT SNARE WITH ELEVIEW INJECTION AND CLIP APPLICATION X 2;  Surgeon: Jeanette Mcmahon MD;  Location: [facility] ENDOSCOPY;  Service: Gastroenterology;  Laterality: N/A;  COLON POLYPS, DIVERTICULOSIS, HEMORRHOIDS   • CYSTOSCOPY     • CYSTOSCOPY RETROGRADE PYELOGRAM     • ENDOSCOPY  2018   • ENDOSCOPY N/A 08/28/2023    Procedure: ESOPHAGOGASTRODUODENOSCOPY WITH BIOPSIES AND ESOPHAGEAL DILATION TO 18 MM;  Surgeon: Jeanette Mcmahon MD;  Location: [facility] ENDOSCOPY;  Service: Gastroenterology;  Laterality: N/A;  ESOPHAGITIS   • FOOT SURGERY     • HAND SURGERY Left    • HAND SURGERY Right    • HIATAL HERNIA REPAIR  05/10/2019   • NECK SURGERY     • TUBAL ABDOMINAL LIGATION     • UPPER GASTROINTESTINAL ENDOSCOPY     • VEIN SURGERY           Current Outpatient Medications:   •  amitriptyline (ELAVIL) 50 MG tablet, Take 1 tablet by mouth Every Night., Disp: 30 tablet, Rfl: 2  •  Calcium Carbonate-Vitamin D 600-10 MG-MCG per tablet, Take 1 tablet by mouth Daily., Disp: 90 tablet,  Rfl: 1  •  chlorhexidine (PERIDEX) 0.12 % solution, Apply 15 mL to the mouth or throat 2 (Two) Times a Day., Disp: , Rfl:   •  Diclofenac Sodium (Voltaren) 1 % gel gel, Apply 4 g topically to the appropriate area as directed 4 (Four) Times a Day As Needed (hand pain)., Disp: 100 g, Rfl: 1  •  estradiol (ESTRACE VAGINAL) 0.1 MG/GM vaginal cream, Place 0.5 gm PV and massage 0.5 gm to vulva and vestibule at night every day for 4 weeks then decrease to 2x/week, Disp: 42.5 g, Rfl: 3  •  galcanezumab-gnlm (EMGALITY) 120 MG/ML auto-injector pen, Inject 1 mL under the skin into the appropriate area as directed Every 30 (Thirty) Days., Disp: 1 mL, Rfl: 5  •  Gemtesa 75 MG tablet, Take 1 tablet by mouth Daily., Disp: 90 tablet, Rfl: 4  •  Lifitegrast (Xiidra) 5 % ophthalmic solution, Administer 1 drop to both eyes 2 (Two) Times a Day., Disp: , Rfl:   •  Linzess 72 MCG capsule capsule, Take 1 capsule by mouth Every Morning Before Breakfast. (Patient taking differently: Take 1 capsule by mouth As Needed.), Disp: 90 capsule, Rfl: 1  •  metroNIDAZOLE (FLAGYL) 0.75 % lotion lotion, , Disp: , Rfl:   •  Miebo 1.338 GM/ML solution, , Disp: , Rfl:   •  mupirocin (BACTROBAN) 2 % ointment, , Disp: , Rfl:   •  omeprazole (priLOSEC) 40 MG capsule, Take 1 capsule by mouth Daily., Disp: 90 capsule, Rfl: 1  •  polyethylene glycol (MIRALAX) 17 GM/SCOOP powder, , Disp: , Rfl:   •  PreviDent 5000 Plus 1.1 % cream, As Needed., Disp: , Rfl:   •  propranolol LA (Inderal LA) 60 MG 24 hr capsule, Take 1 capsule by mouth Daily., Disp: 90 capsule, Rfl: 1  •  simethicone (Mi-Acid Gas Relief) 80 MG chewable tablet, 1 tab(s) chewed 4 times a day PRN for 30 day(s), Disp: , Rfl:   •  simvastatin (ZOCOR) 20 MG tablet, Take 1 tablet by mouth Every Night., Disp: 90 tablet, Rfl: 1  •  Synthroid 50 MCG tablet, Take 1 tablet by mouth Every Morning., Disp: 90 tablet, Rfl: 1  •  tretinoin (RETIN-A) 0.025 % cream, Apply 1 Application topically to the appropriate  "area as directed Every Night., Disp: , Rfl:   •  valACYclovir (Valtrex) 500 MG tablet, Take 1 tablet by mouth Every Other Day., Disp: 45 tablet, Rfl: 1  •  vilazodone (VIIBRYD) 40 MG tablet tablet, Take 1 tablet by mouth Daily., Disp: 90 tablet, Rfl: 2    No Known Allergies     Family History   Problem Relation Age of Onset   • Stroke Mother    • Hypertension Mother    • Heart disease Brother    • Kidney nephrosis Brother    • Hypertension Maternal Grandmother    • Diabetes Maternal Grandmother    • Cancer Maternal Grandfather         Unspecified   • Cancer Maternal Aunt    • Breast cancer Maternal Aunt    • Cancer Other         Unspecified   • Cancer Other         Unspecified   • Lung cancer Other    • Colon cancer Neg Hx        Social History     Socioeconomic History   • Marital status:    • Number of children: 2   Tobacco Use   • Smoking status: Never     Passive exposure: Never   • Smokeless tobacco: Never   Vaping Use   • Vaping status: Never Used   Substance and Sexual Activity   • Alcohol use: Not Currently     Comment: former- 7-8-19   • Drug use: Never   • Sexual activity: Not Currently       Vital Signs:   Resp 14   Ht 167.6 cm (66\")   Wt 68.2 kg (150 lb 5.7 oz)   BMI 24.27 kg/m²      Physical Exam  Vitals reviewed.   Constitutional:       Appearance: Normal appearance.   Neurological:      General: No focal deficit present.      Mental Status: She is alert and oriented to person, place, and time.   Psychiatric:         Mood and Affect: Mood normal.         Behavior: Behavior normal.        Result Review :   The following data was reviewed by: LAURENCE Canchola on 06/20/2025:    Bladder Scan interpretation 06/20/2025    Estimation of residual urine via BeiBeiI 3000 [employer] Bladder Scan  MA/nurse performing: Seema HOWARD MA  Residual Urine: 0 ml  Indication: Urinary urgency    OAB (overactive bladder)   Position: Supine  Examination: Incremental scanning of the suprapubic area using 2.0 " MHz transducer using copious amounts of acoustic gel.   Findings: An anechoic area was demonstrated which represented the bladder, with measurement of residual urine as noted. I inspected this myself. In that the residual urine was stable or insignificant, refer to plan for treatment and plan necessary at this time.            Results        Procedures        Assessment and Plan    Diagnoses and all orders for this visit:    1. Urinary urgency (Primary)  -     Bladder Scan  -     Gemtesa 75 MG tablet; Take 1 tablet by mouth Daily.  Dispense: 90 tablet; Refill: 4    2. OAB (overactive bladder)  -     Bladder Scan  -     Gemtesa 75 MG tablet; Take 1 tablet by mouth Daily.  Dispense: 90 tablet; Refill: 4        Assessment & Plan  1. Urinary incontinence and urgency.  Significant improvement in symptoms has been reported with only two episodes of incontinence since starting Gemtesa. There is no leakage, nocturia is limited to once per night, and there is no hematuria. A prescription for Gemtesa 90-day supply with a year's worth of refills has been sent to the pharmacy. Continued hydration is advised. If any issues arise before the next scheduled visit, an earlier appointment should be arranged.    Follow-up  A follow-up appointment is scheduled for 1 year.      Follow Up   Return in about 1 year (around 6/20/2026).  Patient was given instructions and counseling regarding her condition or for health maintenance advice. Please see specific information pulled into the AVS if appropriate.         This document has been electronically signed by LAURENCE Canchola  June 20, 2025 09:49 EDT     Patient or patient representative verbalized consent for the use of Ambient Listening during the visit with  LAURENCE Canchola for chart documentation. 6/20/2025  09:49 EDT

## 2025-06-20 ENCOUNTER — OFFICE VISIT (OUTPATIENT)
Dept: UROLOGY | Age: 65
End: 2025-06-20
Payer: MEDICARE

## 2025-06-20 VITALS — BODY MASS INDEX: 24.16 KG/M2 | RESPIRATION RATE: 14 BRPM | HEIGHT: 66 IN | WEIGHT: 150.35 LBS

## 2025-06-20 DIAGNOSIS — N32.81 OAB (OVERACTIVE BLADDER): ICD-10-CM

## 2025-06-20 DIAGNOSIS — R39.15 URINARY URGENCY: Primary | ICD-10-CM

## 2025-06-20 LAB — URINE VOLUME: 0

## 2025-06-20 RX ORDER — VIBEGRON 75 MG/1
TABLET, FILM COATED ORAL
COMMUNITY
Start: 2025-04-26 | End: 2025-06-20 | Stop reason: SDUPTHER

## 2025-06-20 RX ORDER — VIBEGRON 75 MG/1
75 TABLET, FILM COATED ORAL DAILY
Qty: 90 TABLET | Refills: 4 | Status: SHIPPED | OUTPATIENT
Start: 2025-06-20

## 2025-06-20 RX ORDER — TRETINOIN 0.25 MG/G
1 CREAM TOPICAL NIGHTLY
COMMUNITY

## (undated) DEVICE — SOLIDIFIER LIQLOC PLS 1500CC BT

## (undated) DEVICE — DEV INFL CRE STERIFLATE 60CC DISP

## (undated) DEVICE — THE SINGLE USE ETRAP – POLYP TRAP IS USED FOR SUCTION RETRIEVAL OF ENDOSCOPICALLY REMOVED POLYPS.: Brand: ETRAP

## (undated) DEVICE — INJ SUB/MUCUS ELEVIEW FOR/GI/ENDO/PROC AMPL/10ML

## (undated) DEVICE — LINER SURG CANSTR SXN S/RIGD 1500CC

## (undated) DEVICE — PAD GRND REM POLYHESIVE A/ DISP

## (undated) DEVICE — Device

## (undated) DEVICE — SINGLE-USE BIOPSY FORCEPS: Brand: RADIAL JAW 4

## (undated) DEVICE — Device: Brand: SINGLE USE INJECTOR NM600/610

## (undated) DEVICE — Device: Brand: DISPOSABLE ELECTROSURGICAL SNARE

## (undated) DEVICE — SNAR POLYP CAPTIFLEX XS/OVL 11X2.4MM 240CM 1P/U

## (undated) DEVICE — Device: Brand: DEFENDO AIR/WATER/SUCTION AND BIOPSY VALVE

## (undated) DEVICE — ESOPHAGEAL BALLOON DILATATION CATHETER: Brand: CRE FIXED WIRE

## (undated) DEVICE — SOL IRRG H2O PL/BG 1000ML STRL

## (undated) DEVICE — BLCK/BITE BLOX WO/DENTL/RIM W/STRAP 54F

## (undated) DEVICE — CONN JET HYDRA H20 AUXILIARY DISP